# Patient Record
Sex: FEMALE | Race: WHITE | NOT HISPANIC OR LATINO | Employment: UNEMPLOYED | URBAN - METROPOLITAN AREA
[De-identification: names, ages, dates, MRNs, and addresses within clinical notes are randomized per-mention and may not be internally consistent; named-entity substitution may affect disease eponyms.]

---

## 2017-01-17 RX ORDER — LOSARTAN POTASSIUM AND HYDROCHLOROTHIAZIDE 12.5; 5 MG/1; MG/1
1 TABLET ORAL DAILY
Qty: 90 TABLET | Refills: 0 | Status: SHIPPED | OUTPATIENT
Start: 2017-01-17 | End: 2017-05-24 | Stop reason: SDUPTHER

## 2017-01-17 RX ORDER — MONTELUKAST SODIUM 10 MG/1
10 TABLET ORAL DAILY
Qty: 90 TABLET | Refills: 0 | Status: SHIPPED | OUTPATIENT
Start: 2017-01-17 | End: 2017-02-02 | Stop reason: SDUPTHER

## 2017-01-26 DIAGNOSIS — E78.5 HYPERLIPIDEMIA, UNSPECIFIED HYPERLIPIDEMIA TYPE: Primary | ICD-10-CM

## 2017-01-26 DIAGNOSIS — I10 HYPERTENSION, ESSENTIAL, BENIGN: ICD-10-CM

## 2017-01-26 LAB
ALBUMIN SERPL-MCNC: 4.7 G/DL (ref 3.5–5.2)
ALBUMIN/GLOB SERPL: 2.1 G/DL
ALP SERPL-CCNC: 60 U/L (ref 40–129)
ALT SERPL-CCNC: 17 U/L (ref 5–33)
AST SERPL-CCNC: 17 U/L (ref 5–32)
BASOPHILS # BLD AUTO: 0.03 10*3/MM3 (ref 0–0.2)
BASOPHILS NFR BLD AUTO: 0.8 % (ref 0–2)
BILIRUB SERPL-MCNC: 0.2 MG/DL (ref 0.2–1.2)
BUN SERPL-MCNC: 18 MG/DL (ref 6–20)
BUN/CREAT SERPL: 33.3 (ref 7–25)
CALCIUM SERPL-MCNC: 9.4 MG/DL (ref 8.6–10.5)
CHLORIDE SERPL-SCNC: 101 MMOL/L (ref 98–107)
CHOLEST SERPL-MCNC: 246 MG/DL (ref 0–200)
CO2 SERPL-SCNC: 26.6 MMOL/L (ref 22–29)
CREAT SERPL-MCNC: 0.54 MG/DL (ref 0.57–1)
EOSINOPHIL # BLD AUTO: 0.09 10*3/MM3 (ref 0.1–0.3)
EOSINOPHIL NFR BLD AUTO: 2.3 % (ref 0–4)
ERYTHROCYTE [DISTWIDTH] IN BLOOD BY AUTOMATED COUNT: 11.9 % (ref 11.5–14.5)
GLOBULIN SER CALC-MCNC: 2.2 GM/DL
GLUCOSE SERPL-MCNC: 87 MG/DL (ref 65–99)
HCT VFR BLD AUTO: 40.7 % (ref 37–47)
HDLC SERPL-MCNC: 78 MG/DL (ref 40–60)
HGB BLD-MCNC: 13.7 G/DL (ref 12–16)
IMM GRANULOCYTES # BLD: 0.01 10*3/MM3 (ref 0–0.03)
IMM GRANULOCYTES NFR BLD: 0.3 % (ref 0–0.5)
LDLC SERPL CALC-MCNC: 135 MG/DL (ref 0–100)
LYMPHOCYTES # BLD AUTO: 1.49 10*3/MM3 (ref 0.6–4.8)
LYMPHOCYTES NFR BLD AUTO: 38.5 % (ref 20–45)
MCH RBC QN AUTO: 33.6 PG (ref 27–31)
MCHC RBC AUTO-ENTMCNC: 33.7 G/DL (ref 31–37)
MCV RBC AUTO: 99.8 FL (ref 81–99)
MONOCYTES # BLD AUTO: 0.37 10*3/MM3 (ref 0–1)
MONOCYTES NFR BLD AUTO: 9.6 % (ref 3–8)
NEUTROPHILS # BLD AUTO: 1.88 10*3/MM3 (ref 1.5–8.3)
NEUTROPHILS NFR BLD AUTO: 48.5 % (ref 45–70)
NRBC BLD AUTO-RTO: 0 /100 WBC (ref 0–0)
PLATELET # BLD AUTO: 242 10*3/MM3 (ref 140–500)
POTASSIUM SERPL-SCNC: 4 MMOL/L (ref 3.5–5.2)
PROT SERPL-MCNC: 6.9 G/DL (ref 6–8.5)
RBC # BLD AUTO: 4.08 10*6/MM3 (ref 4.2–5.4)
SODIUM SERPL-SCNC: 142 MMOL/L (ref 136–145)
TRIGL SERPL-MCNC: 166 MG/DL (ref 0–150)
TSH SERPL DL<=0.005 MIU/L-ACNC: 1.93 MIU/ML (ref 0.27–4.2)
VLDLC SERPL CALC-MCNC: 33.2 MG/DL (ref 7–27)
WBC # BLD AUTO: 3.87 10*3/MM3 (ref 4.8–10.8)

## 2017-02-02 ENCOUNTER — OFFICE VISIT (OUTPATIENT)
Dept: FAMILY MEDICINE CLINIC | Facility: CLINIC | Age: 47
End: 2017-02-02

## 2017-02-02 VITALS
HEIGHT: 60 IN | RESPIRATION RATE: 16 BRPM | WEIGHT: 116 LBS | BODY MASS INDEX: 22.78 KG/M2 | OXYGEN SATURATION: 99 % | DIASTOLIC BLOOD PRESSURE: 70 MMHG | TEMPERATURE: 98.2 F | SYSTOLIC BLOOD PRESSURE: 130 MMHG | HEART RATE: 98 BPM

## 2017-02-02 DIAGNOSIS — B00.1 RECURRENT HERPES LABIALIS: ICD-10-CM

## 2017-02-02 DIAGNOSIS — I10 ESSENTIAL HYPERTENSION: Primary | ICD-10-CM

## 2017-02-02 DIAGNOSIS — E78.2 MIXED HYPERLIPIDEMIA: ICD-10-CM

## 2017-02-02 DIAGNOSIS — D17.22 LIPOMA OF LEFT UPPER EXTREMITY: ICD-10-CM

## 2017-02-02 PROCEDURE — 99214 OFFICE O/P EST MOD 30 MIN: CPT | Performed by: FAMILY MEDICINE

## 2017-02-02 RX ORDER — VALACYCLOVIR HYDROCHLORIDE 1 G/1
TABLET, FILM COATED ORAL
Qty: 16 TABLET | Refills: 2 | Status: SHIPPED | OUTPATIENT
Start: 2017-02-02 | End: 2017-02-03 | Stop reason: SDUPTHER

## 2017-02-02 RX ORDER — MONTELUKAST SODIUM 10 MG/1
10 TABLET ORAL DAILY
Qty: 90 TABLET | Refills: 3 | Status: SHIPPED | OUTPATIENT
Start: 2017-02-02 | End: 2017-05-24 | Stop reason: SDUPTHER

## 2017-02-02 RX ORDER — FLUCONAZOLE 150 MG/1
TABLET ORAL
Qty: 2 TABLET | Refills: 0 | Status: SHIPPED | OUTPATIENT
Start: 2017-02-02 | End: 2017-02-03 | Stop reason: SDUPTHER

## 2017-02-02 NOTE — PROGRESS NOTES
Subjective   Cindy Salas is a 46 y.o. female.   She  is here today to f/u on   Chief Complaint   Patient presents with   • Hypertension   • Hyperlipidemia   • Fever Blister   • Post Nasal Drip   .     History of Present Illness   Mrs. Salas Is here to follow up on hypertension, hyperlipidemia ,And Sinus drainage. Continues to have frequent herpes labalis. Has been using over-the-counter Lip ointment with no Resolution. Has used Valtrex in the past with Good Results.Has been watching her diet closely In efforts to improve her cholesterol. Blood pressure is well-controlled. Feels chronic post nasal drip And the fullness especially of right ear.  Sub Q soft tissue formation in left anti cubital fossa.  Gets tender at times.  The following portions of the patient's history were reviewed and updated as appropriate: current medications, past family history, past medical history, past social history, past surgical history and problem list.    Review of Systems   HENT: Positive for congestion.    All other systems reviewed and are negative.      Objective    Vitals:    02/02/17 1430   BP: 130/70   Pulse: 98   Resp: 16   Temp: 98.2 °F (36.8 °C)   SpO2: 99%     BP Readings from Last 3 Encounters:   02/02/17 130/70   05/03/16 132/82   10/01/15 140/76       Physical Exam   Constitutional: She is oriented to person, place, and time. She appears well-developed and well-nourished.   HENT:   Head: Normocephalic and atraumatic.   Mouth/Throat: Oropharynx is clear and moist.   R TM clear without fluid   upper lip with 1.5 cm darian ulcerating nodule with crust at vermilion border. Tender.  Pale swollen turbinates.   Eyes: EOM are normal. Pupils are equal, round, and reactive to light.   Neck: Normal range of motion. Neck supple. No thyromegaly present.   Cardiovascular: Normal rate, regular rhythm and normal heart sounds.    Pulmonary/Chest: Effort normal and breath sounds normal.   Musculoskeletal: Normal range of motion. She  exhibits no edema.   Lymphadenopathy:     She has no cervical adenopathy.   Neurological: She is alert and oriented to person, place, and time. No cranial nerve deficit.   Skin: Skin is warm and dry.   Spongy soft tissue mass just proximal to R anti cubital fossa.  3x5 cm   Psychiatric: She has a normal mood and affect. Her behavior is normal. Judgment and thought content normal.   Nursing note and vitals reviewed.      Invalid input(s): 2W    Assessment/Plan   Cindy was seen today for hypertension, hyperlipidemia, fever blister and post nasal drip.    Diagnoses and all orders for this visit:    Essential hypertension    Mixed hyperlipidemia    Lipoma of left upper extremity    Recurrent herpes labialis    Other orders  -     montelukast (SINGULAIR) 10 MG tablet; Take 1 tablet by mouth Daily.  -     valACYclovir (VALTREX) 1000 MG tablet; 2 by mouth and repeat 1 time in 12 hours  -     fluconazole (DIFLUCAN) 150 MG tablet; 1 tab po and Repeat in 3 days        Patient Instructions   Start nonsedating antihistamine.          Orders Only on 01/26/2017   Component Date Value Ref Range Status   • WBC 01/26/2017 3.87* 4.80 - 10.80 10*3/mm3 Final   • RBC 01/26/2017 4.08* 4.20 - 5.40 10*6/mm3 Final   • Hemoglobin 01/26/2017 13.7  12.0 - 16.0 g/dL Final   • Hematocrit 01/26/2017 40.7  37.0 - 47.0 % Final   • MCV 01/26/2017 99.8* 81.0 - 99.0 fL Final   • MCH 01/26/2017 33.6* 27.0 - 31.0 pg Final   • MCHC 01/26/2017 33.7  31.0 - 37.0 g/dL Final   • RDW 01/26/2017 11.9  11.5 - 14.5 % Final   • Platelets 01/26/2017 242  140 - 500 10*3/mm3 Final   • Neutrophil Rel % 01/26/2017 48.5  45.0 - 70.0 % Final   • Lymphocyte Rel % 01/26/2017 38.5  20.0 - 45.0 % Final   • Monocyte Rel % 01/26/2017 9.6* 3.0 - 8.0 % Final   • Eosinophil Rel % 01/26/2017 2.3  0.0 - 4.0 % Final   • Basophil Rel % 01/26/2017 0.8  0.0 - 2.0 % Final   • Neutrophils Absolute 01/26/2017 1.88  1.50 - 8.30 10*3/mm3 Final   • Lymphocytes Absolute 01/26/2017 1.49  0.60  - 4.80 10*3/mm3 Final   • Monocytes Absolute 01/26/2017 0.37  0.00 - 1.00 10*3/mm3 Final   • Eosinophils Absolute 01/26/2017 0.09* 0.10 - 0.30 10*3/mm3 Final   • Basophils Absolute 01/26/2017 0.03  0.00 - 0.20 10*3/mm3 Final   • Immature Granulocyte Rel % 01/26/2017 0.3  0.0 - 0.5 % Final   • Immature Grans Absolute 01/26/2017 0.01  0.00 - 0.03 10*3/mm3 Final   • nRBC 01/26/2017 0.0  0.0 - 0.0 /100 WBC Final   • Glucose 01/26/2017 87  65 - 99 mg/dL Final   • BUN 01/26/2017 18  6 - 20 mg/dL Final   • Creatinine 01/26/2017 0.54* 0.57 - 1.00 mg/dL Final   • eGFR Non African Am 01/26/2017 120  >60 mL/min/1.73 Final   • eGFR African Am 01/26/2017 146  >60 mL/min/1.73 Final   • BUN/Creatinine Ratio 01/26/2017 33.3* 7.0 - 25.0 Final   • Sodium 01/26/2017 142  136 - 145 mmol/L Final   • Potassium 01/26/2017 4.0  3.5 - 5.2 mmol/L Final   • Chloride 01/26/2017 101  98 - 107 mmol/L Final   • Total CO2 01/26/2017 26.6  22.0 - 29.0 mmol/L Final   • Calcium 01/26/2017 9.4  8.6 - 10.5 mg/dL Final   • Total Protein 01/26/2017 6.9  6.0 - 8.5 g/dL Final   • Albumin 01/26/2017 4.70  3.50 - 5.20 g/dL Final   • Globulin 01/26/2017 2.2  gm/dL Final   • A/G Ratio 01/26/2017 2.1  g/dL Final   • Total Bilirubin 01/26/2017 0.2  0.2 - 1.2 mg/dL Final   • Alkaline Phosphatase 01/26/2017 60  40 - 129 U/L Final   • AST (SGOT) 01/26/2017 17  5 - 32 U/L Final   • ALT (SGPT) 01/26/2017 17  5 - 33 U/L Final   • Total Cholesterol 01/26/2017 246* 0 - 200 mg/dL Final   • Triglycerides 01/26/2017 166* 0 - 150 mg/dL Final   • HDL Cholesterol 01/26/2017 78* 40 - 60 mg/dL Final   • VLDL Cholesterol 01/26/2017 33.2* 7 - 27 mg/dL Final   • LDL Cholesterol  01/26/2017 135* 0 - 100 mg/dL Final   • TSH 01/26/2017 1.930  0.270 - 4.200 mIU/mL Final

## 2017-02-03 RX ORDER — VALACYCLOVIR HYDROCHLORIDE 1 G/1
TABLET, FILM COATED ORAL
Qty: 16 TABLET | Refills: 2 | Status: SHIPPED | OUTPATIENT
Start: 2017-02-03

## 2017-02-03 RX ORDER — FLUCONAZOLE 150 MG/1
TABLET ORAL
Qty: 2 TABLET | Refills: 0 | Status: SHIPPED | OUTPATIENT
Start: 2017-02-03 | End: 2017-07-07 | Stop reason: SDUPTHER

## 2017-05-16 RX ORDER — LOSARTAN POTASSIUM AND HYDROCHLOROTHIAZIDE 12.5; 5 MG/1; MG/1
TABLET ORAL
Qty: 90 TABLET | Refills: 2 | Status: SHIPPED | OUTPATIENT
Start: 2017-05-16 | End: 2017-05-24 | Stop reason: SDUPTHER

## 2017-05-24 RX ORDER — LOSARTAN POTASSIUM AND HYDROCHLOROTHIAZIDE 12.5; 5 MG/1; MG/1
1 TABLET ORAL DAILY
Qty: 90 TABLET | Refills: 1 | Status: SHIPPED | OUTPATIENT
Start: 2017-05-24 | End: 2017-06-29 | Stop reason: SDUPTHER

## 2017-05-24 RX ORDER — LOSARTAN POTASSIUM AND HYDROCHLOROTHIAZIDE 12.5; 5 MG/1; MG/1
1 TABLET ORAL DAILY
Qty: 90 TABLET | Refills: 1 | Status: SHIPPED | OUTPATIENT
Start: 2017-05-24 | End: 2017-09-05

## 2017-05-24 RX ORDER — MONTELUKAST SODIUM 10 MG/1
10 TABLET ORAL DAILY
Qty: 90 TABLET | Refills: 1 | Status: SHIPPED | OUTPATIENT
Start: 2017-05-24

## 2017-06-29 ENCOUNTER — OFFICE VISIT (OUTPATIENT)
Dept: FAMILY MEDICINE CLINIC | Facility: CLINIC | Age: 47
End: 2017-06-29

## 2017-06-29 VITALS
RESPIRATION RATE: 16 BRPM | BODY MASS INDEX: 22.19 KG/M2 | SYSTOLIC BLOOD PRESSURE: 140 MMHG | WEIGHT: 113 LBS | TEMPERATURE: 98 F | DIASTOLIC BLOOD PRESSURE: 70 MMHG | HEART RATE: 102 BPM | HEIGHT: 60 IN | OXYGEN SATURATION: 99 %

## 2017-06-29 DIAGNOSIS — K11.8 SALIVARY DUCT OBSTRUCTION: Primary | ICD-10-CM

## 2017-06-29 DIAGNOSIS — K11.20 SIALADENITIS: ICD-10-CM

## 2017-06-29 DIAGNOSIS — R10.13 DYSPEPSIA: ICD-10-CM

## 2017-06-29 DIAGNOSIS — M76.60 ACHILLES TENDON PAIN: ICD-10-CM

## 2017-06-29 DIAGNOSIS — R22.0 LEFT FACIAL SWELLING: ICD-10-CM

## 2017-06-29 PROCEDURE — 99214 OFFICE O/P EST MOD 30 MIN: CPT | Performed by: FAMILY MEDICINE

## 2017-06-29 NOTE — PROGRESS NOTES
Subjective   Cidny Salas is a 47 y.o. female.   She  is here today to f/u on   Chief Complaint   Patient presents with   • Sinusitis   • Pain     upper abdomen   .     History of Present Illness   Dyspepsia off/on for yr.  All of the discomfort seems to be midepigastric.  There is no lower abdominal bloating or cramping.  Worse last couple weeks.She decreased her fiber and has had less bloating and discomfort in the last 3 days.  She is used vinegar and water and aloe vera juice which does help most times.  Complains of swelling of the left side of her face around her eye and her maxilla.  Very dry mouth.  Sore throat as though with chronic drainage.  Left ear full and with pressure.  The following portions of the patient's history were reviewed and updated as appropriate: current medications, past family history, past medical history, past social history, past surgical history and problem list.    Review of Systems   HENT: Positive for ear pain, sinus pressure and sore throat.    Gastrointestinal: Positive for abdominal pain.   All other systems reviewed and are negative.      Objective    Vitals:    06/29/17 1100   BP: 140/70   Pulse: 102   Resp: 16   Temp: 98 °F (36.7 °C)   SpO2: 99%   Body mass index is 22.07 kg/(m^2).    BP Readings from Last 3 Encounters:   06/29/17 140/70   02/02/17 130/70   05/03/16 132/82       Physical Exam   Constitutional: She is oriented to person, place, and time. She appears well-developed and well-nourished.   HENT:   Head: Normocephalic and atraumatic.   Right Ear: External ear normal.   Left Ear: External ear normal.   Left buccal mucosa with erythematous clogged opening of the salivary duct.  Fullness within the salivary gland and tenderness when palpated.  Left maxillary area and periorbital mildly swollen with no erythema.   Eyes: EOM are normal. Pupils are equal, round, and reactive to light.   Neck: Normal range of motion. Neck supple. No thyromegaly present.    Cardiovascular: Normal rate, regular rhythm and normal heart sounds.    Pulmonary/Chest: Effort normal and breath sounds normal.   Abdominal: Soft. She exhibits no distension and no mass. There is tenderness. There is no rebound and no guarding.   Mild midepigastric tenderness no mass, no guarding or rebound.  Lower abdominal exam is normal.   Musculoskeletal: Normal range of motion. She exhibits no edema.   Lymphadenopathy:     She has no cervical adenopathy.   Neurological: She is alert and oriented to person, place, and time. No cranial nerve deficit.   Skin: Skin is warm and dry.   Psychiatric: She has a normal mood and affect. Her behavior is normal. Thought content normal.   Nursing note and vitals reviewed.      Invalid input(s): 2W    Assessment/Plan   Cindy was seen today for sinusitis and pain.    Diagnoses and all orders for this visit:    Salivary duct obstruction    Left facial swelling    Dyspepsia    Sialadenitis        Patient Instructions   pepcid AC  Recommend she use the sour wrist lemon drops, Sudafed and ibuprofen for normal milligrams twice a day as well as massage to the left salivary area.  We'll give her written prescription for Augmentin 875 one by mouth twice a day for 7 days  to be used if this is not improved in the next 4-5 days.

## 2017-06-29 NOTE — PATIENT INSTRUCTIONS
pepcid AC  Recommend she use the sour wrist lemon drops, Sudafed and ibuprofen for normal milligrams twice a day as well as massage to the left salivary area.  We'll give her written prescription for Augmentin 875 one by mouth twice a day for 7 days  to be used if this is not improved in the next 4-5 days.

## 2017-07-05 ENCOUNTER — TELEPHONE (OUTPATIENT)
Dept: FAMILY MEDICINE CLINIC | Facility: CLINIC | Age: 47
End: 2017-07-05

## 2017-07-05 RX ORDER — CLINDAMYCIN HYDROCHLORIDE 300 MG/1
300 CAPSULE ORAL 3 TIMES DAILY
Qty: 21 CAPSULE | Refills: 0 | Status: SHIPPED | OUTPATIENT
Start: 2017-07-05 | End: 2017-09-05

## 2017-07-05 NOTE — TELEPHONE ENCOUNTER
notify patient that I have sent a prescription for clindamycin to pharmacy.  Please stop Augmentin.  She may take over-the-counter Benadryl for her itchy rash.  I have listed Augmentin as an allergy in her chart

## 2017-07-07 ENCOUNTER — TELEPHONE (OUTPATIENT)
Dept: FAMILY MEDICINE CLINIC | Facility: CLINIC | Age: 47
End: 2017-07-07

## 2017-07-07 RX ORDER — FLUCONAZOLE 150 MG/1
TABLET ORAL
Qty: 2 TABLET | Refills: 0 | Status: SHIPPED | OUTPATIENT
Start: 2017-07-07 | End: 2017-09-05

## 2017-07-07 NOTE — TELEPHONE ENCOUNTER
Patient says she has a vaginal yeast infection from taking antibiotics.  She is requesting Rx Diflucan.

## 2017-07-13 ENCOUNTER — TELEPHONE (OUTPATIENT)
Dept: FAMILY MEDICINE CLINIC | Facility: CLINIC | Age: 47
End: 2017-07-13

## 2017-07-13 RX ORDER — DOXYCYCLINE HYCLATE 100 MG/1
100 TABLET, DELAYED RELEASE ORAL 2 TIMES DAILY
Qty: 20 TABLET | Refills: 0 | Status: SHIPPED | OUTPATIENT
Start: 2017-07-13 | End: 2017-09-05

## 2017-07-13 NOTE — TELEPHONE ENCOUNTER
Pt left msg requesting another round of abx, she went to the dentist and they told her that it seemed like her problems were more sinus related. She thinks another round of abx will finish clearing everything up. Please advise

## 2017-07-17 ENCOUNTER — TELEPHONE (OUTPATIENT)
Dept: FAMILY MEDICINE CLINIC | Facility: CLINIC | Age: 47
End: 2017-07-17

## 2017-07-17 RX ORDER — METHYLPREDNISOLONE 4 MG/1
TABLET ORAL
Qty: 21 TABLET | Refills: 0 | Status: SHIPPED | OUTPATIENT
Start: 2017-07-17 | End: 2017-09-05

## 2017-07-17 NOTE — TELEPHONE ENCOUNTER
Pt has been on two rounds of abx  And still has a lot of facial swelling and ear pressure/ pain. Thought maybe a steroid pack would help? Please advsie

## 2017-09-05 ENCOUNTER — OFFICE VISIT (OUTPATIENT)
Dept: FAMILY MEDICINE CLINIC | Facility: CLINIC | Age: 47
End: 2017-09-05

## 2017-09-05 VITALS
SYSTOLIC BLOOD PRESSURE: 166 MMHG | HEART RATE: 103 BPM | RESPIRATION RATE: 16 BRPM | DIASTOLIC BLOOD PRESSURE: 90 MMHG | TEMPERATURE: 98.4 F | WEIGHT: 115 LBS | OXYGEN SATURATION: 99 % | HEIGHT: 60 IN | BODY MASS INDEX: 22.58 KG/M2

## 2017-09-05 DIAGNOSIS — I10 ESSENTIAL HYPERTENSION: Primary | ICD-10-CM

## 2017-09-05 PROCEDURE — 99213 OFFICE O/P EST LOW 20 MIN: CPT | Performed by: FAMILY MEDICINE

## 2017-09-05 RX ORDER — CARVEDILOL 12.5 MG/1
12.5 TABLET ORAL 2 TIMES DAILY WITH MEALS
Qty: 30 TABLET | Refills: 11 | Status: SHIPPED | OUTPATIENT
Start: 2017-09-05 | End: 2017-10-24 | Stop reason: ALTCHOICE

## 2017-09-05 NOTE — PATIENT INSTRUCTIONS
She will do blood pressure log home and bring those in in 6 weeks time for my review.  She does not require an office visit unless they are unstable or she does not tolerate Coreg.

## 2017-09-05 NOTE — PROGRESS NOTES
Subjective   Cindy Salas is a 47 y.o. female.   She  is here today to f/u on   Chief Complaint   Patient presents with   • Hypertension   .     History of Present Illness   Ms. Salas comes in today to discuss her blood pressure medicine.  She has been having swelling of her lower face nasal alar and periorally.  Flushing of her upper chest and shoulders.  Sensitivity of her upper chest and upper extremities to light touch.  General malaise as well.  This been going on approximately 2 months.  She noted these as side effects of losartan and stopped her medication.  Immediately began feeling better and swelling started to subside.  Still has some around her mouth.  Denies shortness of breath or nausea.  The following portions of the patient's history were reviewed and updated as appropriate: current medications, past family history, past medical history, past social history, past surgical history and problem list.    Review of Systems   Constitutional: Negative.    HENT: Negative.    Eyes: Negative.    Respiratory: Negative.    Cardiovascular: Negative.    Gastrointestinal: Negative.    Endocrine: Negative.    Genitourinary: Negative.    Musculoskeletal: Negative.    Skin: Negative.         Skin sensitivity on chest and arms, facial swelling   Allergic/Immunologic: Negative.    Neurological: Negative.    Hematological: Negative.    Psychiatric/Behavioral: Negative.    All other systems reviewed and are negative.      Objective    Vitals:    09/05/17 1130   BP: 166/90   Pulse: 103   Resp: 16   Temp: 98.4 °F (36.9 °C)   SpO2: 99%     Allergies   Allergen Reactions   • Losartan Potassium-Hctz Swelling   • Morphine And Related Itching   • Augmentin [Amoxicillin-Pot Clavulanate] Rash     Allergies   Allergen Reactions   • Losartan Potassium-Hctz Swelling   • Morphine And Related Itching   • Augmentin [Amoxicillin-Pot Clavulanate] Rash       BP Readings from Last 3 Encounters:   09/05/17 166/90   06/29/17 140/70    02/02/17 130/70       Physical Exam   Constitutional: She is oriented to person, place, and time. She appears well-developed and well-nourished.   Cardiovascular: Normal rate, regular rhythm and normal heart sounds.    Pulmonary/Chest: Effort normal and breath sounds normal.   Neurological: She is alert and oriented to person, place, and time.   Skin: Skin is warm and dry. No erythema.   Mild swelling adjacent to nasal ala and malar area   Psychiatric: She has a normal mood and affect. Her behavior is normal. Judgment and thought content normal.   Nursing note and vitals reviewed.      Invalid input(s): 2W    Assessment/Plan   Cindy was seen today for hypertension.    Diagnoses and all orders for this visit:    Essential hypertension    Other orders  -     carvedilol (COREG) 12.5 MG tablet; Take 1 tablet by mouth 2 (Two) Times a Day With Meals.        Patient Instructions   She will do blood pressure log home and bring those in in 6 weeks time for my review.  She does not require an office visit unless they are unstable or she does not tolerate Coreg.

## 2017-09-21 ENCOUNTER — TRANSCRIBE ORDERS (OUTPATIENT)
Dept: PHYSICAL THERAPY | Facility: CLINIC | Age: 47
End: 2017-09-21

## 2017-09-21 DIAGNOSIS — M79.671 RIGHT FOOT PAIN: Primary | ICD-10-CM

## 2017-09-25 ENCOUNTER — TELEPHONE (OUTPATIENT)
Dept: FAMILY MEDICINE CLINIC | Facility: CLINIC | Age: 47
End: 2017-09-25

## 2017-09-25 NOTE — TELEPHONE ENCOUNTER
Pt requesting a water pill.  She said you changed her BP medication and her feet and hands are swollen.

## 2017-09-26 RX ORDER — HYDROCHLOROTHIAZIDE 12.5 MG/1
12.5 TABLET ORAL DAILY
Qty: 30 TABLET | Refills: 5 | Status: SHIPPED | OUTPATIENT
Start: 2017-09-26 | End: 2017-10-24 | Stop reason: ALTCHOICE

## 2017-09-26 NOTE — TELEPHONE ENCOUNTER
I have sent a water pill to the pharmacy.  It is the same water pill that was contained in her previous blood pressure medication.  If she gets facial swelling/flushing again she should stop it and let me know immediately.

## 2017-09-27 ENCOUNTER — TREATMENT (OUTPATIENT)
Dept: PHYSICAL THERAPY | Facility: CLINIC | Age: 47
End: 2017-09-27

## 2017-09-27 ENCOUNTER — TELEPHONE (OUTPATIENT)
Dept: FAMILY MEDICINE CLINIC | Facility: CLINIC | Age: 47
End: 2017-09-27

## 2017-09-27 DIAGNOSIS — T79.A22D: Primary | ICD-10-CM

## 2017-09-27 PROCEDURE — 97161 PT EVAL LOW COMPLEX 20 MIN: CPT | Performed by: PHYSICAL THERAPIST

## 2017-09-27 PROCEDURE — 97110 THERAPEUTIC EXERCISES: CPT | Performed by: PHYSICAL THERAPIST

## 2017-09-27 PROCEDURE — 97140 MANUAL THERAPY 1/> REGIONS: CPT | Performed by: PHYSICAL THERAPIST

## 2017-09-27 NOTE — PROGRESS NOTES
Physical Therapy Initial Evaluation and Plan of Care        Patient: Cindy Salas   : 1970  Diagnosis/ICD-10 Code:  Posterior tibial compartment syndrome, left, subsequent encounter [T79.A22D]  Referring practitioner: Jairo Clarke MD  Date of Initial Visit: 2017  Today's Date: 2017  Patient seen for 1 sessions               Subjective Evaluation    History of Present Illness  Date of onset: 2016  Mechanism of injury: Initial injury 20 years ago gymnastics - progressive pain off an on ever since - with increased frequency and intensity over the past year - feel fullness in posterior distal calf with increased activity     Quality of life: excellent    Pain  Current pain rating: 3  At best pain ratin  At worst pain rating: 3  Location: distal left achilles tendon   Quality: cramping (pressure, fullness)  Relieving factors: ice and rest  Aggravating factors: ambulation, stairs and repetitive movement  Progression: worsening    Social Support  Lives in: multiple-level home    Diagnostic Tests  X-ray: normal    Treatments  Previous treatment: medication (advil)  Current treatment: physical therapy  Patient Goals  Patient goals for therapy: decreased edema, decreased pain, increased motion, increased strength, independence with ADLs/IADLs and return to sport/leisure activities             Objective     Neurological Testing   Sensation     Ankle/Foot   Left Ankle/Foot   Diminished: light touch    Comments   Left light touch: decreased posterior calf.     Active Range of Motion   Left Ankle/Foot   Dorsiflexion (ke): 15 degrees   Plantar flexion: 65 degrees   Inversion: 45 degrees   Eversion: 30 degrees     Strength/Myotome Testing     Left Ankle/Foot   Dorsiflexion: 5  Plantar flexion: 5  Inversion: 5  Eversion: 5    Tests   Left Ankle/Foot   Negative for anterior drawer, calcaneal squeeze, interdigital neuroma, metatarsal squeeze, syndesmosis squeeze, valgus tilt and varus tilt.           Assessment & Plan     Assessment  Impairments: abnormal gait, abnormal muscle tone, abnormal or restricted ROM, activity intolerance, impaired balance, impaired physical strength and pain with function  Assessment details: 47 y.o female presents with 1) tender left medial and lateral gastroc 2) c/o fullness and discomfort progresses with activity 3) pain with prolonged standing/ walking 4) 3-4 consistent level of discomfort  Prognosis: good  Functional Limitations: stooping  Goals  Plan Goals: SHORT TERM GOALS:     4 weeks  1. Pt independent with HEP   2. Pt to demonstrate ability to ambulate in the clinic without antalgic gait   3. Pt to demonstrate ability to perform single leg heel raise on the left without increase in pain      LONG TERM GOALS:   8 weeks  1. Pt to demonstrate ability to perform full functional squat with good form and no increase in pain in the left foot/ankle  2. Pt to demonstrate ability to ambulate on TM for 10 minutes with normal gait pattern without increase in pain  3. Pt to demonstrate ability to perform SLS on the left lower extremity for 30 seconds without LOB or increased pain     Plan  Therapy options: will be seen for skilled physical therapy services  Planned modality interventions: cryotherapy, electrical stimulation/Russian stimulation, iontophoresis, TENS, thermotherapy (hydrocollator packs) and ultrasound  Other planned modality interventions: dry needling  Planned therapy interventions: ADL retraining, balance/weight-bearing training, flexibility, functional ROM exercises, gait training, home exercise program, manual therapy, soft tissue mobilization, strengthening, stretching and therapeutic activities  Frequency: 2x week  Duration in weeks: 8  Treatment plan discussed with: patient        Manual Therapy:    8    mins  10253;  Therapeutic Exercise:    15     mins  17255;     Neuromuscular Kolby:        mins  41986;    Therapeutic Activity:          mins  25572;     Gait  Training:           mins  02311;     Ultrasound:     8     mins  43856;    Electrical Stimulation:         mins  23171 ( );  Dry Needling          mins self-pay    Timed Treatment:   31   mins   Total Treatment:     59   mins    PT SIGNATURE: Yamilet Vallecillo, PT   DATE TREATMENT INITIATED: 9/27/2017    Initial Certification  Certification Period: 12/26/2017  I certify that the therapy services are furnished while this patient is under my care.  The services outlined above are required by this patient, and will be reviewed every 90 days.     PHYSICIAN: Jairo Clarke MD      DATE:     Please sign and return via fax to [phone].. Thank you, [facility].

## 2017-10-03 ENCOUNTER — TREATMENT (OUTPATIENT)
Dept: PHYSICAL THERAPY | Facility: CLINIC | Age: 47
End: 2017-10-03

## 2017-10-03 DIAGNOSIS — T79.A22D: Primary | ICD-10-CM

## 2017-10-03 PROCEDURE — 97110 THERAPEUTIC EXERCISES: CPT | Performed by: PHYSICAL THERAPIST

## 2017-10-03 PROCEDURE — 97140 MANUAL THERAPY 1/> REGIONS: CPT | Performed by: PHYSICAL THERAPIST

## 2017-10-03 NOTE — PROGRESS NOTES
Physical Therapy Daily Progress Note        Visit # : 2  Cindy Salas reports: I have been feeling better since last visit - think I would like to try the Dry Needling on Thursday     Subjective     Objective     Palpation     Right   Hypertonic in the anterior tibialis and peroneus.      See Exercise, Manual, and Modality Logs for complete treatment.       Assessment & Plan     Assessment  Assessment details: Presents with improved muscle tone left medial shin. Several small palpable knots still present along medial ridge of anterior shin.  Overall improved subjective reports and less reports of cramping. Tolerated all ROM/ Strength progressions well today. Cont PT 2x per week for mobility and strength- next visit attempt Dry Needling, rocker board, and/or Baps if tolerated.         Progress strengthening /stabilization /functional activity           Manual Therapy:   8     mins  23329;  Therapeutic Exercise:    19     mins  85576;     Neuromuscular Kolby:        mins  63122;    Therapeutic Activity:          mins  74985;     Gait Training:           mins  21204;     Ultrasound:     8     mins  15958;    Electrical Stimulation:         mins  01654 ( );  Dry Needling          mins self-pay    Timed Treatment:   35   mins   Total Treatment:     45   mins    Yamilet Vallecillo PT  Physical Therapist  [place] License # [id number]

## 2017-10-05 ENCOUNTER — TREATMENT (OUTPATIENT)
Dept: PHYSICAL THERAPY | Facility: CLINIC | Age: 47
End: 2017-10-05

## 2017-10-05 DIAGNOSIS — T79.A22D: Primary | ICD-10-CM

## 2017-10-05 PROCEDURE — DRYNDL PR CUSTOM DRY NEEDLING SELF PAY: Performed by: PHYSICAL THERAPIST

## 2017-10-05 NOTE — PROGRESS NOTES
Physical Therapy Daily Progress Note        Visit # : 3  Cindy Salas reports: I think I would like to try the Dry Needling today - I am still a little sore on the inside of my shin     Subjective     Objective     Palpation     Right   Hypertonic in the anterior tibialis, medial gastrocnemius, peroneus and posterior tibialis.      See Exercise, Manual, and Modality Logs for complete treatment.       Assessment & Plan     Assessment  Assessment details: Informed and signed consent for Dry Needling obtained. Improved subjective reports and muscle tone after Dry Needling attempts today. Assess next visit and return to gentle there ex as tolerated. Cont PT 1-2x per week for strength, stabilization, and functional progressions.         Progress strengthening /stabilization /functional activity           Manual Therapy:         mins  76120;  Therapeutic Exercise:         mins  52441;     Neuromuscular Kolby:        mins  12167;    Therapeutic Activity:         mins  92970;     Gait Training:           mins  02109;     Ultrasound:          mins  80533;    Electrical Stimulation:         mins  15431 ( );  Dry Needling     15     mins self-pay      Total Treatment:     25   mins    Yamilet Vallecillo, PT  Physical Therapist  [place] License # [id number]

## 2017-10-10 ENCOUNTER — TREATMENT (OUTPATIENT)
Dept: PHYSICAL THERAPY | Facility: CLINIC | Age: 47
End: 2017-10-10

## 2017-10-10 DIAGNOSIS — T79.A22D: Primary | ICD-10-CM

## 2017-10-10 PROCEDURE — 97530 THERAPEUTIC ACTIVITIES: CPT | Performed by: PHYSICAL THERAPIST

## 2017-10-10 PROCEDURE — 97110 THERAPEUTIC EXERCISES: CPT | Performed by: PHYSICAL THERAPIST

## 2017-10-10 NOTE — PROGRESS NOTES
Physical Therapy Daily Progress Note      Visit # : 4  Cindy Salas reports: The Dry Needling helped quite a bit last time - still feels better overall - occasionally still feel pressure     Subjective     Objective       Palpation   Left   Tenderness of the anterior tibialis, medial gastrocnemius and posterior tibialis.      See Exercise, Manual, and Modality Logs for complete treatment.       Assessment & Plan     Assessment  Assessment details: Presents with improved muscle tone left medial shin.  Overall improved subjective reports and less reports of cramping. Tolerated all ROM/ Strength progressions well today. Cont PT 2x per week for mobility and strength- next visit attempt  rocker board, and/or SLB on green disc if tolerated.         Progress strengthening /stabilization /functional activity           Manual Therapy:         mins  08179;  Therapeutic Exercise:    17     mins  63630;     Neuromuscular Kolby:        mins  47108;    Therapeutic Activity:     10     mins  80127;     Gait Training:           mins  04351;     Ultrasound:     8    mins  17096;    Electrical Stimulation:         mins  64634 ( );  Dry Needling          mins self-pay    Timed Treatment:  35   mins   Total Treatment:     45   mins    Yamilet Vallecillo, PT  Physical Therapist  [place] License # [id number]

## 2017-10-12 ENCOUNTER — TREATMENT (OUTPATIENT)
Dept: PHYSICAL THERAPY | Facility: CLINIC | Age: 47
End: 2017-10-12

## 2017-10-12 DIAGNOSIS — T79.A22D: Primary | ICD-10-CM

## 2017-10-12 PROCEDURE — 97530 THERAPEUTIC ACTIVITIES: CPT | Performed by: PHYSICAL THERAPIST

## 2017-10-12 PROCEDURE — 97140 MANUAL THERAPY 1/> REGIONS: CPT | Performed by: PHYSICAL THERAPIST

## 2017-10-12 PROCEDURE — 97110 THERAPEUTIC EXERCISES: CPT | Performed by: PHYSICAL THERAPIST

## 2017-10-16 ENCOUNTER — TELEPHONE (OUTPATIENT)
Dept: FAMILY MEDICINE CLINIC | Facility: CLINIC | Age: 47
End: 2017-10-16

## 2017-10-16 NOTE — TELEPHONE ENCOUNTER
Pt called and thinks she is having another reaction to her BP med. She said you told her to just call if she started to have symptoms. She said she feels flushed and her face is puffy.

## 2017-10-17 ENCOUNTER — TREATMENT (OUTPATIENT)
Dept: PHYSICAL THERAPY | Facility: CLINIC | Age: 47
End: 2017-10-17

## 2017-10-17 DIAGNOSIS — T79.A22D: Primary | ICD-10-CM

## 2017-10-17 PROCEDURE — 97140 MANUAL THERAPY 1/> REGIONS: CPT | Performed by: PHYSICAL THERAPIST

## 2017-10-17 PROCEDURE — 97110 THERAPEUTIC EXERCISES: CPT | Performed by: PHYSICAL THERAPIST

## 2017-10-17 PROCEDURE — 97530 THERAPEUTIC ACTIVITIES: CPT | Performed by: PHYSICAL THERAPIST

## 2017-10-17 NOTE — PROGRESS NOTES
Physical Therapy Daily Progress Note        Visit # : 6  Cindy Salas reports: I still just feel tight and swollen     Subjective     Objective       Palpation     Additional Palpation Details  No palpable increased muscle tone in left medial gastroc and anterior shin     See Exercise, Manual, and Modality Logs for complete treatment.       Assessment & Plan     Assessment  Assessment details: Presents with improved muscle tone left medial shin- subjective reports of tightness/ fullness persists. Tolerated all ROM/ Strength progressions well today. Cont PT 2x per week for mobility and strength- next visit attempt leg press if tolerated.         Progress strengthening /stabilization /functional activity           Manual Therapy:    8    mins  60758;  Therapeutic Exercise:    17     mins  11935;     Neuromuscular Kolby:        mins  12915;    Therapeutic Activity:     10     mins  93409;     Gait Training:           mins  14770;     Ultrasound:     8     mins  05386;    Electrical Stimulation:         mins  84358 ( );  Dry Needling          mins self-pay    Timed Treatment:   43   mins   Total Treatment:     53   mins    Yamilet Vallecillo, PT  Physical Therapist  [place] License # [id number]

## 2017-10-19 ENCOUNTER — TREATMENT (OUTPATIENT)
Dept: PHYSICAL THERAPY | Facility: CLINIC | Age: 47
End: 2017-10-19

## 2017-10-19 DIAGNOSIS — T79.A22D: Primary | ICD-10-CM

## 2017-10-19 PROCEDURE — 97530 THERAPEUTIC ACTIVITIES: CPT | Performed by: PHYSICAL THERAPIST

## 2017-10-19 PROCEDURE — 97140 MANUAL THERAPY 1/> REGIONS: CPT | Performed by: PHYSICAL THERAPIST

## 2017-10-19 PROCEDURE — 97110 THERAPEUTIC EXERCISES: CPT | Performed by: PHYSICAL THERAPIST

## 2017-10-19 NOTE — PROGRESS NOTES
Physical Therapy Daily Progress Note        Visit # : 7  Cindy Salas reports: I am on day 2 of decreasing my meds to hopefully decrease my swelling     Subjective     Objective       Observations   Left Ankle/Foot   Positive for edema.     Additional Observation Details  Increased distal left medial gastroc/ anterior shin palpable edema     See Exercise, Manual, and Modality Logs for complete treatment.       Assessment & Plan     Assessment  Assessment details: Presents with increased muscle tone left medial shin and palpable edema.  Tolerated all ROM/ Strength progressions well today. Cont PT 2x per week for mobility and strength- next visit attempt leg press if tolerated.         Progress strengthening /stabilization /functional activity           Manual Therapy:    8     mins  41856;  Therapeutic Exercise:    14     mins  20587;     Neuromuscular oKlby:        mins  24240;    Therapeutic Activity:     10     mins  17269;     Gait Training:           mins  22306;     Ultrasound:     8     mins  44948;    Electrical Stimulation:         mins  74167 ( );  Dry Needling          mins self-pay    Timed Treatment:   40   mins   Total Treatment:     50   mins    Yamilet Vallecillo, PT  Physical Therapist  [place] License # [id number]

## 2017-10-24 ENCOUNTER — OFFICE VISIT (OUTPATIENT)
Dept: FAMILY MEDICINE CLINIC | Facility: CLINIC | Age: 47
End: 2017-10-24

## 2017-10-24 ENCOUNTER — TREATMENT (OUTPATIENT)
Dept: PHYSICAL THERAPY | Facility: CLINIC | Age: 47
End: 2017-10-24

## 2017-10-24 VITALS
OXYGEN SATURATION: 98 % | WEIGHT: 112 LBS | BODY MASS INDEX: 21.99 KG/M2 | HEART RATE: 75 BPM | DIASTOLIC BLOOD PRESSURE: 90 MMHG | TEMPERATURE: 98.3 F | RESPIRATION RATE: 16 BRPM | HEIGHT: 60 IN | SYSTOLIC BLOOD PRESSURE: 164 MMHG

## 2017-10-24 DIAGNOSIS — I10 ESSENTIAL HYPERTENSION: Primary | ICD-10-CM

## 2017-10-24 DIAGNOSIS — T79.A22D: Primary | ICD-10-CM

## 2017-10-24 PROCEDURE — 99213 OFFICE O/P EST LOW 20 MIN: CPT | Performed by: FAMILY MEDICINE

## 2017-10-24 PROCEDURE — 97530 THERAPEUTIC ACTIVITIES: CPT | Performed by: PHYSICAL THERAPIST

## 2017-10-24 PROCEDURE — 97110 THERAPEUTIC EXERCISES: CPT | Performed by: PHYSICAL THERAPIST

## 2017-10-24 RX ORDER — NEBIVOLOL 5 MG/1
5 TABLET ORAL DAILY
Qty: 30 TABLET | Refills: 1 | COMMUNITY
Start: 2017-10-24 | End: 2017-11-07 | Stop reason: SDUPTHER

## 2017-10-24 NOTE — PATIENT INSTRUCTIONS
Monitor BP at home on by systolic 5 mg.  If not down in the 120s to 30s increased to 10 mg.  Recheck 6 weeks time call with any concerns

## 2017-10-24 NOTE — PROGRESS NOTES
Physical Therapy Daily Progress Note        Visit # : 8  Cindy Salas reports: I was really active over the weekend - my shin did not bother me to much - I can tell the swelling is getting better in my hands so probably improving in my shins/ankles as well     Subjective     Objective       Palpation     Right Tenderness of the anterior tibialis and medial gastrocnemius.      See Exercise, Manual, and Modality Logs for complete treatment.       Assessment & Plan     Assessment  Assessment details: Presents with slightly improved muscle tone left medial shin and palpable edema.  Tolerated all ROM/ Strength progressions well today. Cont PT 2x per week for mobility and strength- next visit attempt leg press if tolerated.         Progress strengthening /stabilization /functional activity           Manual Therapy:    8     mins  51960;  Therapeutic Exercise:    10     mins  75608;     Neuromuscular Kolby:        mins  89540;    Therapeutic Activity:     9     mins  19199;     Gait Training:           mins  06463;     Ultrasound:     8     mins  12085;    Electrical Stimulation:         mins  98209 ( );  Dry Needling          mins self-pay    Timed Treatment:   35   mins   Total Treatment:     45   mins    Yamilet Vallecillo, PT  Physical Therapist  [place] License # [id number]

## 2017-10-24 NOTE — PROGRESS NOTES
Subjective   Cindy Salas is a 47 y.o. female.   She  is here today to f/u on   Chief Complaint   Patient presents with   • Edema     face,hands,knees,ankles   .     History of Present Illness   Ms. Salas comes back in to follow-up on hypertension.  She was originally on losartan/HCTZ.  Developed flushing and swelling in her face and upper chest and tingling sensations.  That was stopped and she was started on Coreg.  She felt very good on that however started becoming edematous in her hands and feet.  HCTZ was called in and then she began to notice fullness.  Orally and around her nose.  It also did not resolve the fluid issue.  She stopped both the Coreg and the HCTZ and has been on no blood pressure medicine for approximately a week.  Monitoring BP at home it's been in the 140s up to 150.  She has felt pretty good.  Today we will start by systolic 5 mg she will continue to monitor at home if she does accumulate fluid we would try Lasix.  The following portions of the patient's history were reviewed and updated as appropriate: current medications, past family history, past medical history, past social history, past surgical history and problem list.    Review of Systems   Constitutional: Negative.    HENT: Negative.    Eyes: Negative.    Respiratory: Negative.    Cardiovascular: Negative.         Swelling in hands and feet and somearound nose and perioral.   Gastrointestinal: Negative.    Endocrine: Negative.    Genitourinary: Negative.    Musculoskeletal: Negative.    Skin: Negative.    Allergic/Immunologic: Negative.    Neurological: Negative.    Hematological: Negative.    Psychiatric/Behavioral: Negative.    All other systems reviewed and are negative.      Objective    Vitals:    10/24/17 1437   BP: 164/90   Pulse: 75   Resp: 16   Temp: 98.3 °F (36.8 °C)   SpO2: 98%   Body mass index is 21.87 kg/(m^2).  Allergies   Allergen Reactions   • Losartan Potassium-Hctz Swelling   • Morphine And Related Itching   •  Augmentin [Amoxicillin-Pot Clavulanate] Rash       BP Readings from Last 3 Encounters:   10/24/17 164/90   09/05/17 166/90   06/29/17 140/70       Physical Exam   Constitutional: She is oriented to person, place, and time. She appears well-developed and well-nourished.   Neck: Neck supple.   Cardiovascular: Normal rate, regular rhythm, normal heart sounds and intact distal pulses.    Pulmonary/Chest: Effort normal and breath sounds normal.   Musculoskeletal: Normal range of motion.   Neurological: She is alert and oriented to person, place, and time.   Skin: Skin is warm and dry.   Psychiatric: She has a normal mood and affect. Her behavior is normal. Judgment and thought content normal.   Nursing note and vitals reviewed.      Invalid input(s): 2W    Assessment/Plan   Cindy was seen today for edema.    Diagnoses and all orders for this visit:    Essential hypertension    Other orders  -     nebivolol (BYSTOLIC) 5 MG tablet; Take 1 tablet by mouth Daily.        Patient Instructions   Monitor BP at home on by systolic 5 mg.  If not down in the 120s to 30s increased to 10 mg.  Recheck 6 weeks time call with any concerns

## 2017-10-30 ENCOUNTER — APPOINTMENT (OUTPATIENT)
Dept: PREADMISSION TESTING | Facility: HOSPITAL | Age: 47
End: 2017-10-30

## 2017-10-30 VITALS
BODY MASS INDEX: 21.99 KG/M2 | HEART RATE: 79 BPM | RESPIRATION RATE: 20 BRPM | OXYGEN SATURATION: 100 % | DIASTOLIC BLOOD PRESSURE: 84 MMHG | TEMPERATURE: 97.9 F | SYSTOLIC BLOOD PRESSURE: 153 MMHG | WEIGHT: 112 LBS | HEIGHT: 60 IN

## 2017-10-30 LAB
ALBUMIN SERPL-MCNC: 4.9 G/DL (ref 3.5–5.2)
ALBUMIN/GLOB SERPL: 1.7 G/DL
ALP SERPL-CCNC: 45 U/L (ref 39–117)
ALT SERPL W P-5'-P-CCNC: 15 U/L (ref 1–33)
ANION GAP SERPL CALCULATED.3IONS-SCNC: 14 MMOL/L
AST SERPL-CCNC: 18 U/L (ref 1–32)
BASOPHILS # BLD AUTO: 0.02 10*3/MM3 (ref 0–0.2)
BASOPHILS NFR BLD AUTO: 0.4 % (ref 0–1.5)
BILIRUB SERPL-MCNC: 0.3 MG/DL (ref 0.1–1.2)
BUN BLD-MCNC: 15 MG/DL (ref 6–20)
BUN/CREAT SERPL: 24.2 (ref 7–25)
CALCIUM SPEC-SCNC: 9.8 MG/DL (ref 8.6–10.5)
CHLORIDE SERPL-SCNC: 101 MMOL/L (ref 98–107)
CO2 SERPL-SCNC: 26 MMOL/L (ref 22–29)
CREAT BLD-MCNC: 0.62 MG/DL (ref 0.57–1)
DEPRECATED RDW RBC AUTO: 42.5 FL (ref 37–54)
EOSINOPHIL # BLD AUTO: 0.07 10*3/MM3 (ref 0–0.7)
EOSINOPHIL NFR BLD AUTO: 1.4 % (ref 0.3–6.2)
ERYTHROCYTE [DISTWIDTH] IN BLOOD BY AUTOMATED COUNT: 11.4 % (ref 11.7–13)
GFR SERPL CREATININE-BSD FRML MDRD: 102 ML/MIN/1.73
GLOBULIN UR ELPH-MCNC: 2.9 GM/DL
GLUCOSE BLD-MCNC: 84 MG/DL (ref 65–99)
HCT VFR BLD AUTO: 41.5 % (ref 35.6–45.5)
HGB BLD-MCNC: 13.7 G/DL (ref 11.9–15.5)
IMM GRANULOCYTES # BLD: 0 10*3/MM3 (ref 0–0.03)
IMM GRANULOCYTES NFR BLD: 0 % (ref 0–0.5)
LYMPHOCYTES # BLD AUTO: 1.67 10*3/MM3 (ref 0.9–4.8)
LYMPHOCYTES NFR BLD AUTO: 32.6 % (ref 19.6–45.3)
MCH RBC QN AUTO: 33.5 PG (ref 26.9–32)
MCHC RBC AUTO-ENTMCNC: 33 G/DL (ref 32.4–36.3)
MCV RBC AUTO: 101.5 FL (ref 80.5–98.2)
MONOCYTES # BLD AUTO: 0.38 10*3/MM3 (ref 0.2–1.2)
MONOCYTES NFR BLD AUTO: 7.4 % (ref 5–12)
NEUTROPHILS # BLD AUTO: 2.99 10*3/MM3 (ref 1.9–8.1)
NEUTROPHILS NFR BLD AUTO: 58.2 % (ref 42.7–76)
PLATELET # BLD AUTO: 219 10*3/MM3 (ref 140–500)
PMV BLD AUTO: 9.5 FL (ref 6–12)
POTASSIUM BLD-SCNC: 4 MMOL/L (ref 3.5–5.2)
PROT SERPL-MCNC: 7.8 G/DL (ref 6–8.5)
RBC # BLD AUTO: 4.09 10*6/MM3 (ref 3.9–5.2)
SODIUM BLD-SCNC: 141 MMOL/L (ref 136–145)
WBC NRBC COR # BLD: 5.13 10*3/MM3 (ref 4.5–10.7)

## 2017-10-30 PROCEDURE — 36415 COLL VENOUS BLD VENIPUNCTURE: CPT

## 2017-10-30 PROCEDURE — 80053 COMPREHEN METABOLIC PANEL: CPT | Performed by: ORTHOPAEDIC SURGERY

## 2017-10-30 PROCEDURE — 93010 ELECTROCARDIOGRAM REPORT: CPT | Performed by: INTERNAL MEDICINE

## 2017-10-30 PROCEDURE — 93005 ELECTROCARDIOGRAM TRACING: CPT

## 2017-10-30 PROCEDURE — 85025 COMPLETE CBC W/AUTO DIFF WBC: CPT | Performed by: ORTHOPAEDIC SURGERY

## 2017-10-30 RX ORDER — ACETAMINOPHEN, ASPIRIN AND CAFFEINE 250; 250; 65 MG/1; MG/1; MG/1
1 TABLET, FILM COATED ORAL EVERY 6 HOURS PRN
COMMUNITY

## 2017-10-30 RX ORDER — FLUTICASONE PROPIONATE 50 MCG
2 SPRAY, SUSPENSION (ML) NASAL DAILY
COMMUNITY

## 2017-10-30 RX ORDER — FEXOFENADINE HCL 180 MG/1
180 TABLET ORAL DAILY
COMMUNITY

## 2017-11-06 ENCOUNTER — TELEPHONE (OUTPATIENT)
Dept: FAMILY MEDICINE CLINIC | Facility: CLINIC | Age: 47
End: 2017-11-06

## 2017-11-06 ENCOUNTER — ANESTHESIA (OUTPATIENT)
Dept: PERIOP | Facility: HOSPITAL | Age: 47
End: 2017-11-06

## 2017-11-06 ENCOUNTER — HOSPITAL ENCOUNTER (OUTPATIENT)
Facility: HOSPITAL | Age: 47
Setting detail: HOSPITAL OUTPATIENT SURGERY
Discharge: HOME | End: 2017-11-06
Attending: ORTHOPAEDIC SURGERY | Admitting: ORTHOPAEDIC SURGERY

## 2017-11-06 ENCOUNTER — ANESTHESIA EVENT (OUTPATIENT)
Dept: PERIOP | Facility: HOSPITAL | Age: 47
End: 2017-11-06

## 2017-11-06 VITALS
RESPIRATION RATE: 16 BRPM | OXYGEN SATURATION: 100 % | SYSTOLIC BLOOD PRESSURE: 125 MMHG | TEMPERATURE: 97.4 F | HEART RATE: 63 BPM | DIASTOLIC BLOOD PRESSURE: 78 MMHG

## 2017-11-06 DIAGNOSIS — D49.2 SOFT TISSUE TUMOR OF RIGHT FOOT: ICD-10-CM

## 2017-11-06 PROCEDURE — 25010000002 MIDAZOLAM PER 1 MG: Performed by: NURSE ANESTHETIST, CERTIFIED REGISTERED

## 2017-11-06 PROCEDURE — 25010000002 MIDAZOLAM PER 1 MG: Performed by: ANESTHESIOLOGY

## 2017-11-06 PROCEDURE — 88304 TISSUE EXAM BY PATHOLOGIST: CPT | Performed by: ORTHOPAEDIC SURGERY

## 2017-11-06 PROCEDURE — 25010000002 DEXAMETHASONE PER 1 MG: Performed by: NURSE ANESTHETIST, CERTIFIED REGISTERED

## 2017-11-06 PROCEDURE — 25010000002 ONDANSETRON PER 1 MG: Performed by: NURSE ANESTHETIST, CERTIFIED REGISTERED

## 2017-11-06 PROCEDURE — 25010000002 FENTANYL CITRATE (PF) 100 MCG/2ML SOLUTION: Performed by: NURSE ANESTHETIST, CERTIFIED REGISTERED

## 2017-11-06 PROCEDURE — 25010000002 PROPOFOL 10 MG/ML EMULSION: Performed by: NURSE ANESTHETIST, CERTIFIED REGISTERED

## 2017-11-06 RX ORDER — OXYCODONE AND ACETAMINOPHEN 7.5; 325 MG/1; MG/1
1 TABLET ORAL ONCE AS NEEDED
Status: DISCONTINUED | OUTPATIENT
Start: 2017-11-06 | End: 2017-11-06 | Stop reason: HOSPADM

## 2017-11-06 RX ORDER — ONDANSETRON 2 MG/ML
4 INJECTION INTRAMUSCULAR; INTRAVENOUS ONCE AS NEEDED
Status: DISCONTINUED | OUTPATIENT
Start: 2017-11-06 | End: 2017-11-06 | Stop reason: HOSPADM

## 2017-11-06 RX ORDER — BUPIVACAINE HYDROCHLORIDE 5 MG/ML
INJECTION, SOLUTION EPIDURAL; INTRACAUDAL AS NEEDED
Status: DISCONTINUED | OUTPATIENT
Start: 2017-11-06 | End: 2017-11-06 | Stop reason: HOSPADM

## 2017-11-06 RX ORDER — EPHEDRINE SULFATE 50 MG/ML
5 INJECTION, SOLUTION INTRAVENOUS ONCE AS NEEDED
Status: DISCONTINUED | OUTPATIENT
Start: 2017-11-06 | End: 2017-11-06 | Stop reason: HOSPADM

## 2017-11-06 RX ORDER — MIDAZOLAM HYDROCHLORIDE 1 MG/ML
1 INJECTION INTRAMUSCULAR; INTRAVENOUS
Status: DISCONTINUED | OUTPATIENT
Start: 2017-11-06 | End: 2017-11-06 | Stop reason: HOSPADM

## 2017-11-06 RX ORDER — HYDROMORPHONE HYDROCHLORIDE 1 MG/ML
0.5 INJECTION, SOLUTION INTRAMUSCULAR; INTRAVENOUS; SUBCUTANEOUS
Status: DISCONTINUED | OUTPATIENT
Start: 2017-11-06 | End: 2017-11-06 | Stop reason: HOSPADM

## 2017-11-06 RX ORDER — PROMETHAZINE HYDROCHLORIDE 25 MG/1
12.5 TABLET ORAL ONCE AS NEEDED
Status: DISCONTINUED | OUTPATIENT
Start: 2017-11-06 | End: 2017-11-06 | Stop reason: HOSPADM

## 2017-11-06 RX ORDER — FENTANYL CITRATE 50 UG/ML
50 INJECTION, SOLUTION INTRAMUSCULAR; INTRAVENOUS
Status: DISCONTINUED | OUTPATIENT
Start: 2017-11-06 | End: 2017-11-06 | Stop reason: HOSPADM

## 2017-11-06 RX ORDER — CLINDAMYCIN PHOSPHATE 600 MG/50ML
600 INJECTION INTRAVENOUS EVERY 8 HOURS
Status: COMPLETED | OUTPATIENT
Start: 2017-11-06 | End: 2017-11-06

## 2017-11-06 RX ORDER — FAMOTIDINE 10 MG/ML
20 INJECTION, SOLUTION INTRAVENOUS ONCE
Status: COMPLETED | OUTPATIENT
Start: 2017-11-06 | End: 2017-11-06

## 2017-11-06 RX ORDER — DEXAMETHASONE SODIUM PHOSPHATE 4 MG/ML
INJECTION, SOLUTION INTRA-ARTICULAR; INTRALESIONAL; INTRAMUSCULAR; INTRAVENOUS; SOFT TISSUE AS NEEDED
Status: DISCONTINUED | OUTPATIENT
Start: 2017-11-06 | End: 2017-11-06 | Stop reason: SURG

## 2017-11-06 RX ORDER — PROMETHAZINE HYDROCHLORIDE 25 MG/1
25 SUPPOSITORY RECTAL ONCE AS NEEDED
Status: DISCONTINUED | OUTPATIENT
Start: 2017-11-06 | End: 2017-11-06 | Stop reason: HOSPADM

## 2017-11-06 RX ORDER — PROMETHAZINE HYDROCHLORIDE 25 MG/1
25 TABLET ORAL ONCE AS NEEDED
Status: DISCONTINUED | OUTPATIENT
Start: 2017-11-06 | End: 2017-11-06 | Stop reason: HOSPADM

## 2017-11-06 RX ORDER — DIPHENHYDRAMINE HYDROCHLORIDE 50 MG/ML
12.5 INJECTION INTRAMUSCULAR; INTRAVENOUS
Status: DISCONTINUED | OUTPATIENT
Start: 2017-11-06 | End: 2017-11-06 | Stop reason: HOSPADM

## 2017-11-06 RX ORDER — LABETALOL HYDROCHLORIDE 5 MG/ML
5 INJECTION, SOLUTION INTRAVENOUS
Status: DISCONTINUED | OUTPATIENT
Start: 2017-11-06 | End: 2017-11-06 | Stop reason: HOSPADM

## 2017-11-06 RX ORDER — FLUMAZENIL 0.1 MG/ML
0.2 INJECTION INTRAVENOUS AS NEEDED
Status: DISCONTINUED | OUTPATIENT
Start: 2017-11-06 | End: 2017-11-06 | Stop reason: HOSPADM

## 2017-11-06 RX ORDER — LIDOCAINE HYDROCHLORIDE 20 MG/ML
INJECTION, SOLUTION INFILTRATION; PERINEURAL AS NEEDED
Status: DISCONTINUED | OUTPATIENT
Start: 2017-11-06 | End: 2017-11-06 | Stop reason: SURG

## 2017-11-06 RX ORDER — SODIUM CHLORIDE 0.9 % (FLUSH) 0.9 %
1-10 SYRINGE (ML) INJECTION AS NEEDED
Status: DISCONTINUED | OUTPATIENT
Start: 2017-11-06 | End: 2017-11-06 | Stop reason: HOSPADM

## 2017-11-06 RX ORDER — PROPOFOL 10 MG/ML
VIAL (ML) INTRAVENOUS CONTINUOUS PRN
Status: DISCONTINUED | OUTPATIENT
Start: 2017-11-06 | End: 2017-11-06 | Stop reason: SURG

## 2017-11-06 RX ORDER — HYDROCODONE BITARTRATE AND ACETAMINOPHEN 7.5; 325 MG/1; MG/1
1 TABLET ORAL ONCE AS NEEDED
Status: DISCONTINUED | OUTPATIENT
Start: 2017-11-06 | End: 2017-11-06 | Stop reason: HOSPADM

## 2017-11-06 RX ORDER — SODIUM CHLORIDE, SODIUM LACTATE, POTASSIUM CHLORIDE, CALCIUM CHLORIDE 600; 310; 30; 20 MG/100ML; MG/100ML; MG/100ML; MG/100ML
9 INJECTION, SOLUTION INTRAVENOUS CONTINUOUS
Status: DISCONTINUED | OUTPATIENT
Start: 2017-11-06 | End: 2017-11-06 | Stop reason: HOSPADM

## 2017-11-06 RX ORDER — MIDAZOLAM HYDROCHLORIDE 1 MG/ML
2 INJECTION INTRAMUSCULAR; INTRAVENOUS
Status: DISCONTINUED | OUTPATIENT
Start: 2017-11-06 | End: 2017-11-06 | Stop reason: HOSPADM

## 2017-11-06 RX ORDER — MIDAZOLAM HYDROCHLORIDE 1 MG/ML
INJECTION INTRAMUSCULAR; INTRAVENOUS AS NEEDED
Status: DISCONTINUED | OUTPATIENT
Start: 2017-11-06 | End: 2017-11-06 | Stop reason: SURG

## 2017-11-06 RX ORDER — PROMETHAZINE HYDROCHLORIDE 25 MG/ML
12.5 INJECTION, SOLUTION INTRAMUSCULAR; INTRAVENOUS ONCE AS NEEDED
Status: DISCONTINUED | OUTPATIENT
Start: 2017-11-06 | End: 2017-11-06 | Stop reason: HOSPADM

## 2017-11-06 RX ORDER — HYDRALAZINE HYDROCHLORIDE 20 MG/ML
5 INJECTION INTRAMUSCULAR; INTRAVENOUS
Status: DISCONTINUED | OUTPATIENT
Start: 2017-11-06 | End: 2017-11-06 | Stop reason: HOSPADM

## 2017-11-06 RX ORDER — ONDANSETRON 2 MG/ML
INJECTION INTRAMUSCULAR; INTRAVENOUS AS NEEDED
Status: DISCONTINUED | OUTPATIENT
Start: 2017-11-06 | End: 2017-11-06 | Stop reason: SURG

## 2017-11-06 RX ORDER — NALOXONE HCL 0.4 MG/ML
0.2 VIAL (ML) INJECTION AS NEEDED
Status: DISCONTINUED | OUTPATIENT
Start: 2017-11-06 | End: 2017-11-06 | Stop reason: HOSPADM

## 2017-11-06 RX ORDER — FENTANYL CITRATE 50 UG/ML
INJECTION, SOLUTION INTRAMUSCULAR; INTRAVENOUS AS NEEDED
Status: DISCONTINUED | OUTPATIENT
Start: 2017-11-06 | End: 2017-11-06 | Stop reason: SURG

## 2017-11-06 RX ADMIN — DEXAMETHASONE SODIUM PHOSPHATE 6 MG: 4 INJECTION, SOLUTION INTRAMUSCULAR; INTRAVENOUS at 07:36

## 2017-11-06 RX ADMIN — MIDAZOLAM 2 MG: 1 INJECTION INTRAMUSCULAR; INTRAVENOUS at 06:47

## 2017-11-06 RX ADMIN — CLINDAMYCIN PHOSPHATE 600 MG: 12 INJECTION, SOLUTION INTRAVENOUS at 07:15

## 2017-11-06 RX ADMIN — FENTANYL CITRATE 25 MCG: 50 INJECTION INTRAMUSCULAR; INTRAVENOUS at 07:21

## 2017-11-06 RX ADMIN — LIDOCAINE HYDROCHLORIDE 40 MG: 20 INJECTION, SOLUTION INFILTRATION; PERINEURAL at 07:18

## 2017-11-06 RX ADMIN — SODIUM CHLORIDE, POTASSIUM CHLORIDE, SODIUM LACTATE AND CALCIUM CHLORIDE 9 ML/HR: 600; 310; 30; 20 INJECTION, SOLUTION INTRAVENOUS at 06:30

## 2017-11-06 RX ADMIN — MIDAZOLAM HYDROCHLORIDE 1 MG: 1 INJECTION, SOLUTION INTRAMUSCULAR; INTRAVENOUS at 07:23

## 2017-11-06 RX ADMIN — FENTANYL CITRATE 25 MCG: 50 INJECTION INTRAMUSCULAR; INTRAVENOUS at 07:28

## 2017-11-06 RX ADMIN — PROPOFOL 160 MCG/KG/MIN: 10 INJECTION, EMULSION INTRAVENOUS at 07:19

## 2017-11-06 RX ADMIN — FAMOTIDINE 20 MG: 10 INJECTION, SOLUTION INTRAVENOUS at 06:45

## 2017-11-06 RX ADMIN — MIDAZOLAM HYDROCHLORIDE 1 MG: 1 INJECTION, SOLUTION INTRAMUSCULAR; INTRAVENOUS at 07:18

## 2017-11-06 RX ADMIN — ONDANSETRON 4 MG: 2 INJECTION INTRAMUSCULAR; INTRAVENOUS at 07:54

## 2017-11-06 NOTE — ANESTHESIA POSTPROCEDURE EVALUATION
Patient: Cindy Salas    Procedure Summary     Date Anesthesia Start Anesthesia Stop Room / Location    11/06/17 0715 0804  [facility] /  [facility]       Procedure Diagnosis Surgeon Provider    EXCISION TUMOR RT FOREFOOT (Right ) No diagnosis on file. MD Nico Bee MD          Anesthesia Type: MAC  Last vitals  BP   125/78 (11/06/17 0815)   Temp   36.3 °C (97.4 °F) (11/06/17 0802)   Pulse   63 (11/06/17 0815)   Resp   16 (11/06/17 0815)     SpO2   100 % (11/06/17 0815)     Post Anesthesia Care and Evaluation    Patient location during evaluation: bedside  Patient participation: complete - patient participated  Level of consciousness: awake  Pain score: 1  Pain management: adequate  Airway patency: patent  Anesthetic complications: No anesthetic complications    Cardiovascular status: acceptable  Respiratory status: acceptable  Hydration status: acceptable    Comments: --------------------            11/06/17 0815     --------------------   BP:       125/78     Pulse:      63       Resp:       16       Temp:                SpO2:      100%     --------------------

## 2017-11-06 NOTE — OP NOTE
Preoperative diagnosis: Tumor right foot    Postoperative diagnosis: Same, pathology pending    Procedure: Excision tumor right foot    Monitored anesthesia care with ankle block by surgeon    Surgeon: Jairo Clarke M.D.    No assistant complication or drain.    Specimen: 6 x 8 x 20 mm tumor right foot, excised from tendon sheath and capsule    Technique: After appropriate preoperative consultation the patient's made comfortable in the supine position on the operating room table.  Parenteral antibiotics are given and a surgical pause was undertaken to identify appropriate patient surgical site and surgeon.  After IV sedation, 22 cc half percent Marcaine plain used establish right foot and ankle block regional anesthesia by surgeon.  The foot is gently exsanguinated and a well-padded right supramalleolar ankle tourniquet inflated to 250 mmHg.  Prep and drape were done in the usual sterile free fashion.    A longitudinal incision is made over the dorsal lateral aspect of the right forefoot near the base of the great toe through skin and dermis only.  Blunt dissection is taken deep to that protecting cuticular nerves.  The mass is identified and found to be longitudinally oriented.  It had its origin from the extensor hallucis longus tendon sheath and deep to the lateral capsule.  It seemed to be intimately involved with the dorsolateral branch cuticular nerve. .    Blunt dissection was done proximal to distal excising the mass in its entirety in continuity with its pseudocapsule.  Its stalk was cleanly and sharply divided and cauterized.  There was no attenuation of the extensor tendon by the tumor but this may have had a primary neural component, as explained preoperatively to the patient.  Wound was copiously irrigated and no other abnormalities seen.  Cautery was used judiciously to achieve hemostasis.  Circulation returned promptly upon releasing the tourniquet.  Pulsatile lavage was used.  Hemostasis was again  checked and the wound closed with 4-0 Prolene horizontal mattress and simple sutures.  A sterile dressing was applied with gentle Ace wrap compression over padding.  Patient left the operating room comfortable with her block in effect after having tolerated the procedure well.  The 6 x 8 x 20 mm specimen was divided on the back table and did have typical gelatinous clear contents.  This was sent for permanent histopathologic exam.

## 2017-11-06 NOTE — TELEPHONE ENCOUNTER
Pt called and requested a 90 days RX of her bystolic and a RX for lasix. I did not see a RX for Lasix on her list.

## 2017-11-06 NOTE — ANESTHESIA PREPROCEDURE EVALUATION
Anesthesia Evaluation     Patient summary reviewed and Nursing notes reviewed          Airway   Mallampati: I  no difficulty expected  Dental      Pulmonary - negative pulmonary ROS   Cardiovascular - negative cardio ROS    ECG reviewed        Neuro/Psych- negative ROS  GI/Hepatic/Renal/Endo - negative ROS     Musculoskeletal (-) negative ROS    Abdominal    Substance History - negative use     OB/GYN negative ob/gyn ROS         Other                                        Anesthesia Plan    ASA 2     MAC     Anesthetic plan and risks discussed with patient.

## 2017-11-06 NOTE — H&P
History & Physical       Patient: Cindy Salas    Date of Admission: 2017  5:58 AM    YOB: 1970    Medical Record Number: [id number]    Attending Physician: Jairo Clarke MD        Chief Complaints: enlarging mass right forefoot      History of Present Illness: 47 y.o. female presents with tumor right forefoot Onset of symptoms was gradual starting 4 months ago.  Symptoms are associated with pain.  Symptoms are aggravated by shoes.   Symptoms improve with nothing. Patient is now being admitted to the services of Jairo Clarke MD for further evaluation and treatment.     Allergies   Allergen Reactions   • Losartan Potassium-Hctz Swelling   • Morphine And Related Itching   • Augmentin [Amoxicillin-Pot Clavulanate] Rash       Medications:     Home Medications:  No current facility-administered medications on file prior to encounter.      Current Outpatient Prescriptions on File Prior to Encounter   Medication Sig   • montelukast (SINGULAIR) 10 MG tablet Take 1 tablet by mouth Daily. (Patient taking differently: Take 10 mg by mouth Every Morning.)   • valACYclovir (VALTREX) 1000 MG tablet 2 by mouth and repeat 1 time in 12 hours (Patient taking differently: As Needed (cold sore flare-up). 2 by mouth and repeat 1 time in 12 hours)     Current Medications:  Scheduled Meds:  clindamycin 600 mg Intravenous Q8H     Continuous Infusions:  lactated ringers 9 mL/hr Last Rate: 9 mL/hr (17 0630)     PRN Meds:.fentanyl  •  midazolam **OR** midazolam  •  sodium chloride       Past Medical History:   Diagnosis Date   • Arthritis    • Headache    • Hyperlipidemia    • Hypertension    • Migraine    • Spinal headache     following          Past Surgical History:   Procedure Laterality Date   • BREAST AUGMENTATION Bilateral    • BREAST SURGERY     •  SECTION     • HYSTERECTOMY          Social History     Occupational History   • Not on file.     Social History Main Topics   • Smoking  status: Former Smoker     Packs/day: 1.00     Years: 10.00     Types: Cigarettes     Quit date: 1997   • Smokeless tobacco: Never Used   • Alcohol use 3.6 oz/week     6 Glasses of wine per week   • Drug use: No   • Sexual activity: Not on file    Social History     Social History Narrative        Family History   Problem Relation Age of Onset   • Hypertension Mother    • Heart disease Father    • Cancer Father      prostate   • Hypertension Brother    • Hyperlipidemia Brother    • Malig Hyperthermia Neg Hx          Review of Systems:   HEENT: Patient denies any headaches, vision changes, change in hearing, or tinnitus, Patient denies any rhinorrhea,epistaxis, sinus pain, mouth or dental problems, sore throat or hoarseness, or dysphagia  Pulmonary: Patient denies any cough, congestion, SOA, or wheezing  Cardiovascular: Patient denies any chest pain, dyspnea, palpitations, weakness, intolerance of exercise, varicosities, swelling of extremities, known murmur  Gastrointestinal:  Patient denies nausea, vomiting, diarrhea, constipation, loss  of appetite, change in appetite, dysphagia, gas, heartburn, melena, change in bowel habits, use of laxatives or other drugs to alter the function of the gastrointestinal tract.  Genital/Urinary: Patient denies dysuria, change in color of urine, change in frequency of urination, pain with urgency, incontinence, retention, or nocturia.  Musculoskeletal: With the exception of the involved extremity, the patient denies increased warmth; redness; or swelling of joints; limitation of function; deformity; crepitation: pain in a joint or an extremity, the neck, or the back, especially with movement.  Neurological: Patient denies dizziness, tremor, ataxia, difficulty in speaking, change in speech, paresthesia, loss of sensation, seizures, syncope, changes in memory.  Endocrine system: Patient denies tremors, palpitations, intolerance of heat or cold, polyuria, polydipsia, polyphagia,  diaphoresis, exophthalmos, or goiter.  Psychological: Patient denies thoughts/plans or harming self or other; depression,  insomnia, night terrors, neetu, memory loss, disorientation.  Skin: Patient denies any bruising, rashes, discoloration, pruritus, wounds, or changes in the hair or nails.  Hematopoietic: Patient denies history of spontaneous or excessive bleeding, epistaxis, hematuria, melena, fatigue, enlarged or tender lymph nodes, pallor, history of anemia.    Physical Exam: 47 y.o. female  General Appearance:    Alert, cooperative, in no acute distress                    Vitals:    11/06/17 0630 11/06/17 0649   BP: 158/81    BP Location: Right arm    Patient Position: Lying    Pulse: 74 61   Resp: 16 16   Temp: 97.8 °F (36.6 °C)    TempSrc: Oral    SpO2: 99% 99%  Comment: post versed        Head:    Normocephalic, without obvious abnormality, atraumatic   Eyes:            Lids and lashes normal, conjunctivae and sclerae normal, no   icterus, no pallor, corneas clear, PERRLA   Ears:    Ears appear intact with no abnormalities noted   Throat:   No oral lesions, no thrush, oral mucosa moist   Neck:   No adenopathy, supple, trachea midline, no thyromegaly, no   carotid bruit, no JVD   Back:     No kyphosis present, no scoliosis present, no skin lesions,      erythema or scars, no tenderness to percussion or                   palpation,   range of motion normal   Lungs:     Clear to auscultation,respirations regular, even and                  unlabored    Heart:    Regular rhythm and normal rate, normal S1 and S2, no            murmur, no gallop, no rub, no click   Chest Wall:    No abnormalities observed   Abdomen:     Normal bowel sounds, no masses, no organomegaly, soft        non-tender, non-distended, no guarding, no rebound                tenderness   Rectal:     Deferred   Extremities:   Tenderness over right hallux  . Moves all extremities well, no edema,   no cyanosis, no redness   Pulses:   Pulses  palpable and equal bilaterally   Skin:   No bleeding, bruising or rash   Lymph nodes:   No palpable adenopathy   Neurologic:   Cranial nerves 2 - 12 grossly intact, sensation intact, DTR       present and equal bilaterally           Diagnostic Tests:    Results from last 7 days  Lab Units 10/30/17  1437   WBC 10*3/mm3 5.13   HEMOGLOBIN g/dL 13.7   HEMATOCRIT % 41.5   PLATELETS 10*3/mm3 219       Results from last 7 days  Lab Units 10/30/17  1437   SODIUM mmol/L 141   POTASSIUM mmol/L 4.0   CHLORIDE mmol/L 101   CO2 mmol/L 26.0   BUN mg/dL 15   CREATININE mg/dL 0.62   GLUCOSE mg/dL 84   CALCIUM mg/dL 9.8         No results found for: CRYSTAL]  No results found for: CULTURE]  No results found for: URICACID]    No results found.    Assessment:  Patient Active Problem List   Diagnosis   • Atopic rhinitis   • Hyperlipidemia   • Hypertension   • Osteoarthritis   • Lipoma of left upper extremity   • Recurrent herpes labialis           Plan:  The patient voiced understanding of the risks, benefits, and alternative forms of treatment that were discussed. All questions were answered and the patient consents to proceed with the procedure as planned.        Discharge Plan: today to home      Date: 11/6/2017    Jairo Clarke MD    EMR Dragon/Transcription disclaimer:   Much of this encounter note is an electronic transcription/translation of spoken language to printed text. The electronic translation of spoken language may permit erroneous, or at times, nonsensical words or phrases to be inadvertently transcribed; Although I have reviewed the note for such errors, some may still exist.

## 2017-11-06 NOTE — DISCHARGE INSTRUCTIONS
WHAT YOU CAN EXPECT  1. Swelling:  This is to be expected. It is difficult to specify what constitutes as an abnormal amount of swelling. You can minimize this by staying off your feet, keeping the foot elevated and applying ice.  2. Pain:  Everyone experiences pain, unfortunately, some worse than others.  You will be given a prescription for pain medication before you leave the hospital, either by Dr. Clarke or one of his assistants.  Please feel free to check with us if you are allergic to any pain medication. If you have had local anesthesia, start taking the medication when the anesthesia begins to wear off.  3. Bleeding:  This always occurs.  You will notice slight oozing occasionally through the bandages.  If bleeding continues and soaks through the dressing please call us.    WHAT TO DO AFTER SURGERY  1. When you return home you must rest.  You may either sit in a chair or in bed, with the foot elevated.  The position of the foot should be above the level of the heart.  Propping the foot up on a few pillows always helps.  2. Do not do any excessive or unnecessary walking during the first few days after surgery.  May get up during the first 48 hours only to eat and use the restroom.  Each operation is slightly different, and you may be specifically told that no walking is allowed at all for a period of time.  Under these circumstances, you will be best off using a crutch or walker.  3. If you are given a special shoe to use after surgery, you may not walk without it.  You do not, however, have to wear the shoe at night.  You will find it easier to commence walking on your heel and put more weight on the flat foot over the course of the next few days.  4. Use ice over the foot for about 24 hours after surgery.  The small commercially available ice packs are useless.  Fill a large garbage bag with ice and prop the bag over the foot, not on the ankle.  Alternatively, place the ice bag on the bed and turn on your  side with the foot lying directly on the ice pack.  5. If you are taking pain medication, there are common side effects such as dizziness, drowsiness, and nausea.  If these or an allergic reaction occur, stop taking the medication and call us.  To prevent nausea, eat prior to taking the medication.    DO NOT DO ANY OF THE FOLLOWING  1. Do not get the bandages wet.  When bathing, either sponge bathe or hang the foot over the side of the bath.  The safest is to get into the empty tub with the shoe on, elevate the foot, and then fill the tub.  Preferably, empty the tub prior to getting out.  Showering is possible with commercial plastic protectors.  2. Don't remove your dressing unless specifically instructed to do so. You may loosen the elastic wrap if needed for tightness.    *Please understand that the postoperative course varies from patient to patient, and these are only meant as guidelines to a smoother recovery.  These instructions do not cover all aspects of your post-operative care and recovery and if you have any questions which you feel are unanswered by this instruction sheet, please call us.    Please call us if you develop a fever associated with redness or drainage around the wound.  We are interested in your prompt and healthy recovery.  Please cooperate with the above instructions. Please call the office for a follow-up appt at [phone].

## 2017-11-07 LAB
CYTO UR: NORMAL
LAB AP CASE REPORT: NORMAL
Lab: NORMAL
PATH REPORT.FINAL DX SPEC: NORMAL
PATH REPORT.GROSS SPEC: NORMAL

## 2017-11-07 RX ORDER — NEBIVOLOL 5 MG/1
5 TABLET ORAL EVERY MORNING
Qty: 90 TABLET | Refills: 1 | Status: SHIPPED | OUTPATIENT
Start: 2017-11-07 | End: 2018-02-13 | Stop reason: SDUPTHER

## 2017-11-07 RX ORDER — NEBIVOLOL 5 MG/1
5 TABLET ORAL EVERY MORNING
Qty: 30 TABLET | Refills: 2 | Status: SHIPPED | OUTPATIENT
Start: 2017-11-07 | End: 2017-11-07 | Stop reason: SDUPTHER

## 2017-11-07 RX ORDER — FUROSEMIDE 20 MG/1
20 TABLET ORAL DAILY PRN
Qty: 30 TABLET | Refills: 2 | Status: SHIPPED | OUTPATIENT
Start: 2017-11-07 | End: 2017-11-07 | Stop reason: SDUPTHER

## 2017-11-07 RX ORDER — FUROSEMIDE 20 MG/1
20 TABLET ORAL DAILY PRN
Qty: 90 TABLET | Refills: 1 | Status: SHIPPED | OUTPATIENT
Start: 2017-11-07 | End: 2018-03-03 | Stop reason: SDUPTHER

## 2017-11-07 NOTE — TELEPHONE ENCOUNTER
Please see if she is taking 5 or 10 mg of by systolic.  Okay for Lasix 20 mg 1 by mouth daily when necessary edema #30 with 2 refills

## 2017-12-04 RX ORDER — FLUCONAZOLE 150 MG/1
TABLET ORAL
Qty: 2 TABLET | Refills: 0 | Status: SHIPPED | OUTPATIENT
Start: 2017-12-04 | End: 2018-02-13

## 2017-12-08 RX ORDER — FLUCONAZOLE 150 MG/1
TABLET ORAL
Qty: 2 TABLET | Refills: 0 | Status: SHIPPED | OUTPATIENT
Start: 2017-12-08 | End: 2017-12-29 | Stop reason: SDUPTHER

## 2017-12-29 RX ORDER — FLUCONAZOLE 150 MG/1
TABLET ORAL
Qty: 2 TABLET | Refills: 0 | Status: SHIPPED | OUTPATIENT
Start: 2017-12-29 | End: 2018-02-13

## 2018-02-05 DIAGNOSIS — Z00.00 ROUTINE ADULT HEALTH MAINTENANCE: ICD-10-CM

## 2018-02-05 DIAGNOSIS — Z00.00 ROUTINE ADULT HEALTH MAINTENANCE: Primary | ICD-10-CM

## 2018-02-06 LAB
ALBUMIN SERPL-MCNC: 5 G/DL (ref 3.5–5.2)
ALBUMIN/GLOB SERPL: 2.2 G/DL
ALP SERPL-CCNC: 44 U/L (ref 40–129)
ALT SERPL-CCNC: 12 U/L (ref 5–33)
AST SERPL-CCNC: 14 U/L (ref 5–32)
BASOPHILS # BLD AUTO: 0.02 10*3/MM3 (ref 0–0.2)
BASOPHILS NFR BLD AUTO: 0.6 % (ref 0–2)
BILIRUB SERPL-MCNC: 0.3 MG/DL (ref 0.2–1.2)
BUN SERPL-MCNC: 14 MG/DL (ref 6–20)
BUN/CREAT SERPL: 23.7 (ref 7–25)
CALCIUM SERPL-MCNC: 9.2 MG/DL (ref 8.6–10.5)
CHLORIDE SERPL-SCNC: 101 MMOL/L (ref 98–107)
CHOLEST SERPL-MCNC: 257 MG/DL (ref 0–200)
CO2 SERPL-SCNC: 28.3 MMOL/L (ref 22–29)
CREAT SERPL-MCNC: 0.59 MG/DL (ref 0.57–1)
EOSINOPHIL # BLD AUTO: 0.11 10*3/MM3 (ref 0.1–0.3)
EOSINOPHIL NFR BLD AUTO: 3.1 % (ref 0–4)
ERYTHROCYTE [DISTWIDTH] IN BLOOD BY AUTOMATED COUNT: 12.2 % (ref 11.5–14.5)
GFR SERPLBLD CREATININE-BSD FMLA CKD-EPI: 108 ML/MIN/1.73
GFR SERPLBLD CREATININE-BSD FMLA CKD-EPI: 131 ML/MIN/1.73
GLOBULIN SER CALC-MCNC: 2.3 GM/DL
GLUCOSE SERPL-MCNC: 93 MG/DL (ref 65–99)
HCT VFR BLD AUTO: 39.2 % (ref 37–47)
HDLC SERPL-MCNC: 96 MG/DL (ref 40–60)
HGB BLD-MCNC: 13.1 G/DL (ref 12–16)
IMM GRANULOCYTES # BLD: 0.01 10*3/MM3 (ref 0–0.03)
IMM GRANULOCYTES NFR BLD: 0.3 % (ref 0–0.5)
LDLC SERPL CALC-MCNC: 145 MG/DL (ref 0–100)
LYMPHOCYTES # BLD AUTO: 1.46 10*3/MM3 (ref 0.6–4.8)
LYMPHOCYTES NFR BLD AUTO: 40.7 % (ref 20–45)
MCH RBC QN AUTO: 33.7 PG (ref 27–31)
MCHC RBC AUTO-ENTMCNC: 33.4 G/DL (ref 31–37)
MCV RBC AUTO: 100.8 FL (ref 81–99)
MONOCYTES # BLD AUTO: 0.37 10*3/MM3 (ref 0–1)
MONOCYTES NFR BLD AUTO: 10.3 % (ref 3–8)
NEUTROPHILS # BLD AUTO: 1.62 10*3/MM3 (ref 1.5–8.3)
NEUTROPHILS NFR BLD AUTO: 45 % (ref 45–70)
NRBC BLD AUTO-RTO: 0 /100 WBC (ref 0–0)
PLATELET # BLD AUTO: 254 10*3/MM3 (ref 140–500)
POTASSIUM SERPL-SCNC: 4.4 MMOL/L (ref 3.5–5.2)
PROT SERPL-MCNC: 7.3 G/DL (ref 6–8.5)
RBC # BLD AUTO: 3.89 10*6/MM3 (ref 4.2–5.4)
SODIUM SERPL-SCNC: 141 MMOL/L (ref 136–145)
TRIGL SERPL-MCNC: 78 MG/DL (ref 0–150)
TSH SERPL DL<=0.005 MIU/L-ACNC: 1.88 MIU/ML (ref 0.27–4.2)
VLDLC SERPL CALC-MCNC: 15.6 MG/DL (ref 7–27)
WBC # BLD AUTO: 3.59 10*3/MM3 (ref 4.8–10.8)

## 2018-02-13 ENCOUNTER — OFFICE VISIT (OUTPATIENT)
Dept: FAMILY MEDICINE CLINIC | Facility: CLINIC | Age: 48
End: 2018-02-13

## 2018-02-13 VITALS
HEART RATE: 69 BPM | WEIGHT: 109 LBS | HEIGHT: 60 IN | DIASTOLIC BLOOD PRESSURE: 80 MMHG | BODY MASS INDEX: 21.4 KG/M2 | SYSTOLIC BLOOD PRESSURE: 136 MMHG | RESPIRATION RATE: 16 BRPM | OXYGEN SATURATION: 99 %

## 2018-02-13 DIAGNOSIS — Z00.00 ENCOUNTER FOR HEALTH MAINTENANCE EXAMINATION IN ADULT: Primary | ICD-10-CM

## 2018-02-13 DIAGNOSIS — I10 ESSENTIAL HYPERTENSION: ICD-10-CM

## 2018-02-13 DIAGNOSIS — E78.2 MIXED HYPERLIPIDEMIA: ICD-10-CM

## 2018-02-13 DIAGNOSIS — D75.89 MACROCYTOSIS WITHOUT ANEMIA: ICD-10-CM

## 2018-02-13 LAB
FOLATE SERPL-MCNC: >20 NG/ML (ref 4.78–20)
VIT B12 SERPL-MCNC: 751 PG/ML (ref 232–1245)

## 2018-02-13 PROCEDURE — 99396 PREV VISIT EST AGE 40-64: CPT | Performed by: FAMILY MEDICINE

## 2018-02-13 RX ORDER — NEBIVOLOL 5 MG/1
5 TABLET ORAL EVERY MORNING
Qty: 90 TABLET | Refills: 3 | Status: SHIPPED | OUTPATIENT
Start: 2018-02-13 | End: 2018-03-06 | Stop reason: SDUPTHER

## 2018-02-13 NOTE — PATIENT INSTRUCTIONS
Call with results of labs.  Recommend decrease dairy to help control her LDLs.  Risk factor of hypertension but otherwise she gets adequate exercise, is normotensive, is appropriate weight, nonsmoker.

## 2018-02-13 NOTE — PROGRESS NOTES
Subjective   Cindy Salas is a 47 y.o. female.   She  is here today to f/u on   Chief Complaint   Patient presents with   • Annual Exam   .     History of Present Illness   Mrs. Slaas is here for CPE.  Ongoing issues of hypertension, allergic rhinitis and hyperlipidemia.  She is very conscientious about her diet and exercise.  Exercise 1 hr qd plus other cardio.  She does not use all of her allergy medication every day.  Has a history of macrocytosis.  Has seen orthopedics recently for a soft tissue mass in the medial antecubital fossa of her left arm, musculoskeletal tightness in left posterior ankle.  The following portions of the patient's history were reviewed and updated as appropriate: current medications, past family history, past medical history, past social history, past surgical history and problem list.    Review of Systems   Constitutional: Negative.    HENT: Negative.    Eyes: Negative.    Respiratory: Negative.  Negative for shortness of breath.    Cardiovascular: Negative.  Negative for chest pain and palpitations.   Gastrointestinal: Negative.    Endocrine: Negative.    Genitourinary: Negative.    Musculoskeletal: Negative.  Negative for neck pain.   Skin: Negative.    Allergic/Immunologic: Negative.    Neurological: Negative.  Negative for headaches.   Hematological: Negative.    Psychiatric/Behavioral: Negative.    All other systems reviewed and are negative.      Objective    Vitals:    02/13/18 1100   BP: 136/80   Pulse: 69   Resp: 16   SpO2: 99%   Body mass index is 21.29 kg/(m^2).  Allergies   Allergen Reactions   • Losartan Potassium-Hctz Swelling   • Morphine And Related Itching   • Augmentin [Amoxicillin-Pot Clavulanate] Rash       BP Readings from Last 3 Encounters:   02/13/18 136/80   11/06/17 125/78   10/30/17 153/84       Physical Exam   Constitutional: She is oriented to person, place, and time. She appears well-developed and well-nourished.   HENT:   Head: Normocephalic and  atraumatic.   Right Ear: External ear normal.   Left Ear: External ear normal.   Nose: Nose normal.   Mouth/Throat: Oropharynx is clear and moist.   Eyes: Conjunctivae and EOM are normal. Pupils are equal, round, and reactive to light.   Neck: Normal range of motion. Neck supple. No thyromegaly present.   Cardiovascular: Normal rate, regular rhythm, normal heart sounds and intact distal pulses.    No murmur heard.  Pulmonary/Chest: Effort normal and breath sounds normal.   Abdominal: Soft. Bowel sounds are normal. She exhibits no distension. There is no hepatosplenomegaly. There is no tenderness. There is no rebound and no guarding.   Musculoskeletal: Normal range of motion. She exhibits no edema or tenderness.   Lymphadenopathy:     She has no cervical adenopathy.   Neurological: She is alert and oriented to person, place, and time. She has normal reflexes. No cranial nerve deficit. Coordination normal.   Skin: Skin is warm and dry.   Psychiatric: She has a normal mood and affect. Her behavior is normal. Judgment and thought content normal.   Nursing note and vitals reviewed.      Invalid input(s): 2W    Assessment/Plan   Cindy was seen today for annual exam.    Diagnoses and all orders for this visit:    Encounter for health maintenance examination in adult    Macrocytosis without anemia  -     Vitamin B12  -     Folate    Mixed hyperlipidemia    Essential hypertension    Other orders  -     nebivolol (BYSTOLIC) 5 MG tablet; Take 1 tablet by mouth Every Morning.        Patient Instructions   Call with results of labs.  Recommend decrease dairy to help control her LDLs.  Risk factor of hypertension but otherwise she gets adequate exercise, is normotensive, is appropriate weight, nonsmoker.          Orders Only on 02/05/2018   Component Date Value Ref Range Status   • WBC 02/06/2018 3.59* 4.80 - 10.80 10*3/mm3 Final   • RBC 02/06/2018 3.89* 4.20 - 5.40 10*6/mm3 Final   • Hemoglobin 02/06/2018 13.1  12.0 - 16.0 g/dL  Final   • Hematocrit 02/06/2018 39.2  37.0 - 47.0 % Final   • MCV 02/06/2018 100.8* 81.0 - 99.0 fL Final   • MCH 02/06/2018 33.7* 27.0 - 31.0 pg Final   • MCHC 02/06/2018 33.4  31.0 - 37.0 g/dL Final   • RDW 02/06/2018 12.2  11.5 - 14.5 % Final   • Platelets 02/06/2018 254  140 - 500 10*3/mm3 Final   • Neutrophil Rel % 02/06/2018 45.0  45.0 - 70.0 % Final   • Lymphocyte Rel % 02/06/2018 40.7  20.0 - 45.0 % Final   • Monocyte Rel % 02/06/2018 10.3* 3.0 - 8.0 % Final   • Eosinophil Rel % 02/06/2018 3.1  0.0 - 4.0 % Final   • Basophil Rel % 02/06/2018 0.6  0.0 - 2.0 % Final   • Neutrophils Absolute 02/06/2018 1.62  1.50 - 8.30 10*3/mm3 Final   • Lymphocytes Absolute 02/06/2018 1.46  0.60 - 4.80 10*3/mm3 Final   • Monocytes Absolute 02/06/2018 0.37  0.00 - 1.00 10*3/mm3 Final   • Eosinophils Absolute 02/06/2018 0.11  0.10 - 0.30 10*3/mm3 Final   • Basophils Absolute 02/06/2018 0.02  0.00 - 0.20 10*3/mm3 Final   • Immature Granulocyte Rel % 02/06/2018 0.3  0.0 - 0.5 % Final   • Immature Grans Absolute 02/06/2018 0.01  0.00 - 0.03 10*3/mm3 Final   • nRBC 02/06/2018 0.0  0.0 - 0.0 /100 WBC Final   • Glucose 02/06/2018 93  65 - 99 mg/dL Final   • BUN 02/06/2018 14  6 - 20 mg/dL Final   • Creatinine 02/06/2018 0.59  0.57 - 1.00 mg/dL Final   • eGFR Non  Am 02/06/2018 108  >60 mL/min/1.73 Final   • eGFR African Am 02/06/2018 131  >60 mL/min/1.73 Final   • BUN/Creatinine Ratio 02/06/2018 23.7  7.0 - 25.0 Final   • Sodium 02/06/2018 141  136 - 145 mmol/L Final   • Potassium 02/06/2018 4.4  3.5 - 5.2 mmol/L Final   • Chloride 02/06/2018 101  98 - 107 mmol/L Final   • Total CO2 02/06/2018 28.3  22.0 - 29.0 mmol/L Final   • Calcium 02/06/2018 9.2  8.6 - 10.5 mg/dL Final   • Total Protein 02/06/2018 7.3  6.0 - 8.5 g/dL Final   • Albumin 02/06/2018 5.00  3.50 - 5.20 g/dL Final   • Globulin 02/06/2018 2.3  gm/dL Final   • A/G Ratio 02/06/2018 2.2  g/dL Final   • Total Bilirubin 02/06/2018 0.3  0.2 - 1.2 mg/dL Final   •  Alkaline Phosphatase 02/06/2018 44  40 - 129 U/L Final   • AST (SGOT) 02/06/2018 14  5 - 32 U/L Final   • ALT (SGPT) 02/06/2018 12  5 - 33 U/L Final   • Total Cholesterol 02/06/2018 257* 0 - 200 mg/dL Final   • Triglycerides 02/06/2018 78  0 - 150 mg/dL Final   • HDL Cholesterol 02/06/2018 96* 40 - 60 mg/dL Final   • VLDL Cholesterol 02/06/2018 15.6  7 - 27 mg/dL Final   • LDL Cholesterol  02/06/2018 145* 0 - 100 mg/dL Final   • TSH 02/06/2018 1.880  0.270 - 4.200 mIU/mL Final

## 2018-02-15 ENCOUNTER — TELEPHONE (OUTPATIENT)
Dept: FAMILY MEDICINE CLINIC | Facility: CLINIC | Age: 48
End: 2018-02-15

## 2018-03-05 RX ORDER — FUROSEMIDE 20 MG/1
TABLET ORAL
Qty: 90 TABLET | Refills: 0 | Status: SHIPPED | OUTPATIENT
Start: 2018-03-05 | End: 2018-06-01 | Stop reason: SDUPTHER

## 2018-03-07 RX ORDER — NEBIVOLOL HYDROCHLORIDE 5 MG/1
TABLET ORAL
Qty: 90 TABLET | Refills: 1 | Status: SHIPPED | OUTPATIENT
Start: 2018-03-07

## 2018-04-02 ENCOUNTER — OFFICE VISIT (OUTPATIENT)
Dept: FAMILY MEDICINE CLINIC | Facility: CLINIC | Age: 48
End: 2018-04-02

## 2018-04-02 VITALS
HEIGHT: 60 IN | SYSTOLIC BLOOD PRESSURE: 122 MMHG | RESPIRATION RATE: 16 BRPM | WEIGHT: 113 LBS | BODY MASS INDEX: 22.19 KG/M2 | HEART RATE: 78 BPM | DIASTOLIC BLOOD PRESSURE: 70 MMHG | TEMPERATURE: 98.1 F | OXYGEN SATURATION: 98 %

## 2018-04-02 DIAGNOSIS — IMO0001 WOUND ABSCESS, INITIAL ENCOUNTER: Primary | ICD-10-CM

## 2018-04-02 DIAGNOSIS — Z23 NEED FOR DIPHTHERIA-TETANUS-PERTUSSIS (TDAP) VACCINE: ICD-10-CM

## 2018-04-02 PROBLEM — IMO0002 WOUND ABSCESS: Status: ACTIVE | Noted: 2018-04-02

## 2018-04-02 PROCEDURE — 90471 IMMUNIZATION ADMIN: CPT | Performed by: PHYSICIAN ASSISTANT

## 2018-04-02 PROCEDURE — 90715 TDAP VACCINE 7 YRS/> IM: CPT | Performed by: PHYSICIAN ASSISTANT

## 2018-04-02 PROCEDURE — 99212 OFFICE O/P EST SF 10 MIN: CPT | Performed by: PHYSICIAN ASSISTANT

## 2018-04-02 RX ORDER — CEPHALEXIN 500 MG/1
500 CAPSULE ORAL 2 TIMES DAILY
Qty: 20 CAPSULE | Refills: 0 | Status: SHIPPED | OUTPATIENT
Start: 2018-04-02 | End: 2018-04-09

## 2018-04-02 RX ORDER — FLUCONAZOLE 150 MG/1
150 TABLET ORAL DAILY
Qty: 2 TABLET | Refills: 0 | Status: SHIPPED | OUTPATIENT
Start: 2018-04-02

## 2018-04-02 NOTE — PROGRESS NOTES
"Subjective   Cindy Salas is a 47 y.o. female.   Chief Complaint   Patient presents with   • Pain     right ankle       History of Present Illness     Cindy is a 47-year-old female who presents with right ankle pain for the past 6 weeks.  She states about 6 weeks ago she fell out a railroad tie hat had a metal L shaped object.   She has had some  Redness and swelling.  She was in [facility] on March 12-21,2018.  Last tetanus shot has been more than 10 years.  She did not seek medical attention when she fell on the railroad tie 6 weeks ago.  Denied any fevers, chills, or discharge from wound.    The following portions of the patient's history were reviewed and updated as appropriate: allergies, current medications, past family history, past medical history, past social history and past surgical history.    Review of Systems   Constitutional: Negative.    HENT: Negative.    Eyes: Negative.    Respiratory: Negative.    Cardiovascular: Negative.    Gastrointestinal: Negative.    Endocrine: Negative.    Genitourinary: Negative.    Musculoskeletal: Positive for arthralgias (right ankle pain).   Skin: Positive for wound.   Allergic/Immunologic: Negative.    Neurological: Negative.    Hematological: Negative.    Psychiatric/Behavioral: Negative.    All other systems reviewed and are negative.      Social History     Social History   • Marital status:      Social History Main Topics   • Smoking status: Former Smoker     Packs/day: 1.00     Years: 10.00     Types: Cigarettes     Quit date: 1997   • Smokeless tobacco: Never Used   • Alcohol use 3.6 oz/week     6 Glasses of wine per week   • Drug use: No     Other Topics Concern   • Not on file     Vitals:    04/02/18 1403   BP: 122/70   BP Location: Right arm   Patient Position: Sitting   Cuff Size: Adult   Pulse: 78   Resp: 16   Temp: 98.1 °F (36.7 °C)   TempSrc: Oral   SpO2: 98%   Weight: 51.3 kg (113 lb)   Height: 152.4 cm (60\")       Wt Readings from Last 3 " Encounters:   04/02/18 51.3 kg (113 lb)   02/13/18 49.4 kg (109 lb)   10/30/17 50.8 kg (112 lb)       BP Readings from Last 3 Encounters:   04/02/18 122/70   02/13/18 136/80   11/06/17 125/78     Body mass index is 22.07 kg/m².  Allergies   Allergen Reactions   • Losartan Potassium-Hctz Swelling   • Morphine And Related Itching   • Augmentin [Amoxicillin-Pot Clavulanate] Rash       Objective   Physical Exam   Constitutional: She is oriented to person, place, and time. Vital signs are normal. She appears well-developed and well-nourished.   Neck: Phonation normal. No edema present.   Cardiovascular: Normal pulses.    Musculoskeletal:        Right ankle: She exhibits normal range of motion and no swelling. Achilles tendon normal.        Neurological: She is alert and oriented to person, place, and time.   Skin: Skin is warm, dry and intact.   Psychiatric: She has a normal mood and affect. Her speech is normal and behavior is normal. Judgment and thought content normal. Cognition and memory are normal.         Foreign Body Removal  Date/Time: 4/2/2018 5:23 PM  Performed by: ALESSIA ROBERSTON  Authorized by: ALESSIA ROBERTSON   Consent: Verbal consent obtained. Written consent obtained.  Risks and benefits: risks, benefits and alternatives were discussed  Consent given by: patient  Patient understanding: patient states understanding of the procedure being performed  Patient consent: the patient's understanding of the procedure matches consent given  Procedure consent: procedure consent matches procedure scheduled  Patient identity confirmed: verbally with patient  Intake: right ankle.  Anesthesia: local infiltration    Anesthesia:  Local Anesthetic: lidocaine 1% without epinephrine  Anesthetic total: 0.4 mL    Sedation:  Patient sedated: no  Patient restrained: no  Patient cooperative: yes  Complexity: simple  Objects recovered: 0  Post-procedure assessment: foreign body not removed  Patient tolerance: Patient tolerated  the procedure well with no immediate complications  Comments: She tolerated procedure well.  No FOB was found. A wound culture was collected and sent to lab.      Assessment/Plan   Cindy was seen today for pain.    Diagnoses and all orders for this visit:    Wound abscess, initial encounter  -     Anaerobic & Aerobic Culture ([facility] Only) - Swab, Ankle, Right; Future  -     Tdap Vaccine Greater Than or Equal To 8yo IM  -     cephalexin (KEFLEX) 500 MG capsule; Take 1 capsule by mouth 2 (Two) Times a Day.  -     fluconazole (DIFLUCAN) 150 MG tablet; Take 1 tablet by mouth Daily. As needed  -     Anaerobic & Aerobic Culture ([facility] Only) - Swab, Ankle, Right  -     Foreign Body Removal    Need for diphtheria-tetanus-pertussis (Tdap) vaccine      Mrs. Cindy Salas was seen at office visit with right ankle wound with mild cellulitis/abscess.  Culture was collected and sent to the laboratory for further evaluation.  I have prescribed Keflex antibiotic to pharmacy.  States she has been on in past without difficulty.  I have prescribed Diflucan antibiotic due to her getting yeast infections with antibiotic usage.  Cindy will be notified of test results when completed. Cindy has signed the Tdap consent and will received at office visit today. She will soak her foot and ankle in Epson salt daily.  She voiced understanding.  She'll return to office if no improvement.

## 2018-04-03 PROBLEM — Z23 NEED FOR DIPHTHERIA-TETANUS-PERTUSSIS (TDAP) VACCINE: Status: ACTIVE | Noted: 2018-04-03

## 2018-04-08 LAB
BACTERIA SPEC AEROBE CULT: ABNORMAL
BACTERIA SPEC ANAEROBE CULT: ABNORMAL
BACTERIA SPEC CULT: ABNORMAL
BACTERIA SPEC CULT: ABNORMAL
OTHER ANTIBIOTIC SUSC ISLT: ABNORMAL

## 2018-04-09 DIAGNOSIS — IMO0001 WOUND ABSCESS, INITIAL ENCOUNTER: Primary | ICD-10-CM

## 2018-04-09 RX ORDER — SULFAMETHOXAZOLE AND TRIMETHOPRIM 800; 160 MG/1; MG/1
1 TABLET ORAL 2 TIMES DAILY
Qty: 20 TABLET | Refills: 0 | Status: SHIPPED | OUTPATIENT
Start: 2018-04-09

## 2018-06-01 RX ORDER — FUROSEMIDE 20 MG/1
20 TABLET ORAL DAILY PRN
Qty: 90 TABLET | Refills: 0 | Status: SHIPPED | OUTPATIENT
Start: 2018-06-01

## 2018-09-10 RX ORDER — FUROSEMIDE 20 MG/1
20 TABLET ORAL DAILY PRN
Qty: 90 TABLET | Refills: 0 | OUTPATIENT
Start: 2018-09-10

## 2019-01-09 ENCOUNTER — IMMUNIZATION (OUTPATIENT)
Dept: RETAIL CLINIC | Facility: CLINIC | Age: 49
End: 2019-01-09

## 2019-01-09 DIAGNOSIS — Z23 NEED FOR VACCINATION: Primary | ICD-10-CM

## 2019-01-09 NOTE — PATIENT INSTRUCTIONS
"Influenza (Flu) Vaccine (Inactivated or Recombinant): What You Need to Know  1. Why get vaccinated?  Influenza (\"flu\") is a contagious disease that spreads around the [place] every year, usually between October and May.  Flu is caused by influenza viruses, and is spread mainly by coughing, sneezing, and close contact.  Anyone can get flu. Flu strikes suddenly and can last several days. Symptoms vary by age, but can include:  · fever/chills  · sore throat  · muscle aches  · fatigue  · cough  · headache  · runny or stuffy nose    Flu can also lead to pneumonia and blood infections, and cause diarrhea and seizures in children. If you have a medical condition, such as heart or lung disease, flu can make it worse.  Flu is more dangerous for some people. Infants and young children, people 65 years of age and older, pregnant women, and people with certain health conditions or a weakened immune system are at greatest risk.  Each year thousands of people in the [place] die from flu, and many more are hospitalized.  Flu vaccine can:  · keep you from getting flu,  · make flu less severe if you do get it, and  · keep you from spreading flu to your family and other people.  2. Inactivated and recombinant flu vaccines  A dose of flu vaccine is recommended every flu season. Children 6 months through 8 years of age may need two doses during the same flu season. Everyone else needs only one dose each flu season.  Some inactivated flu vaccines contain a very small amount of a mercury-based preservative called thimerosal. Studies have not shown thimerosal in vaccines to be harmful, but flu vaccines that do not contain thimerosal are available.  There is no live flu virus in flu shots. They cannot cause the flu.  There are many flu viruses, and they are always changing. Each year a new flu vaccine is made to protect against three or four viruses that are likely to cause disease in the upcoming flu season. But even when the " vaccine doesn't exactly match these viruses, it may still provide some protection.  Flu vaccine cannot prevent:  · flu that is caused by a virus not covered by the vaccine, or  · illnesses that look like flu but are not.    It takes about 2 weeks for protection to develop after vaccination, and protection lasts through the flu season.  3. Some people should not get this vaccine  Tell the person who is giving you the vaccine:  · If you have any severe, life-threatening allergies. If you ever had a life-threatening allergic reaction after a dose of flu vaccine, or have a severe allergy to any part of this vaccine, you may be advised not to get vaccinated. Most, but not all, types of flu vaccine contain a small amount of egg protein.  · If you ever had Guillain-Barré Syndrome (also called GBS). Some people with a history of GBS should not get this vaccine. This should be discussed with your doctor.  · If you are not feeling well. It is usually okay to get flu vaccine when you have a mild illness, but you might be asked to come back when you feel better.    4. Risks of a vaccine reaction  With any medicine, including vaccines, there is a chance of reactions. These are usually mild and go away on their own, but serious reactions are also possible.  Most people who get a flu shot do not have any problems with it.  Minor problems following a flu shot include:  · soreness, redness, or swelling where the shot was given  · hoarseness  · sore, red or itchy eyes  · cough  · fever  · aches  · headache  · itching  · fatigue    If these problems occur, they usually begin soon after the shot and last 1 or 2 days.  More serious problems following a flu shot can include the following:  · There may be a small increased risk of Guillain-Secor Syndrome (GBS) after inactivated flu vaccine. This risk has been estimated at 1 or 2 additional cases per million people vaccinated. This is much lower than the risk of severe complications from  flu, which can be prevented by flu vaccine.  · Young children who get the flu shot along with pneumococcal vaccine (PCV13) and/or DTaP vaccine at the same time might be slightly more likely to have a seizure caused by fever. Ask your doctor for more information. Tell your doctor if a child who is getting flu vaccine has ever had a seizure.    Problems that could happen after any injected vaccine:  · People sometimes faint after a medical procedure, including vaccination. Sitting or lying down for about 15 minutes can help prevent fainting, and injuries caused by a fall. Tell your doctor if you feel dizzy, or have vision changes or ringing in the ears.  · Some people get severe pain in the shoulder and have difficulty moving the arm where a shot was given. This happens very rarely.  · Any medication can cause a severe allergic reaction. Such reactions from a vaccine are very rare, estimated at about 1 in a million doses, and would happen within a few minutes to a few hours after the vaccination.  As with any medicine, there is a very remote chance of a vaccine causing a serious injury or death.  The safety of vaccines is always being monitored. For more information, visit: www.cdc.gov/vaccinesafety/  5. What if there is a serious reaction?  What should I look for?  Look for anything that concerns you, such as signs of a severe allergic reaction, very high fever, or unusual behavior.  Signs of a severe allergic reaction can include hives, swelling of the face and throat, difficulty breathing, a fast heartbeat, dizziness, and weakness. These would start a few minutes to a few hours after the vaccination.  What should I do?  · If you think it is a severe allergic reaction or other emergency that can't wait, call 9-1-1 and get the person to the nearest hospital. Otherwise, call your doctor.  · Reactions should be reported to the Vaccine Adverse Event Reporting System (VAERS). Your doctor should file this report, or you  can do it yourself through the VAERS web site at www.vaers.[facility].gov, or by calling 1-269.125.5042.  ? VAERS does not give medical advice.  6. The National Vaccine Injury Compensation Program  The National Vaccine Injury Compensation Program (VICP) is a federal program that was created to compensate people who may have been injured by certain vaccines.  Persons who believe they may have been injured by a vaccine can learn about the program and about filing a claim by calling 1-277.285.5110 or visiting the VICP website at www.[facility].gov/vaccinecompensation. There is a time limit to file a claim for compensation.  7. How can I learn more?  · Ask your healthcare provider. He or she can give you the vaccine package insert or suggest other sources of information.  · Call your local or state health department.  · Contact the Centers for Disease Control and Prevention (CDC):  ? Call 1-686.969.3866 (7-922-XSI-INFO) or  ? Visit CDC's website at www.cdc.gov/flu  Vaccine Information Statement, Inactivated Influenza Vaccine (08/07/2015)  This information is not intended to replace advice given to you by your health care provider. Make sure you discuss any questions you have with your health care provider.  Document Released: 10/12/2007 Document Revised: 09/07/2017 Document Reviewed: 09/07/2017  Elsevier Interactive Patient Education © 2017 [employer]

## 2019-05-13 ENCOUNTER — DOCTOR'S OFFICE (OUTPATIENT)
Dept: URBAN - NONMETROPOLITAN AREA CLINIC 1 | Facility: CLINIC | Age: 49
Setting detail: OPHTHALMOLOGY
End: 2019-05-13
Payer: COMMERCIAL

## 2019-05-13 ENCOUNTER — OPTICAL OFFICE (OUTPATIENT)
Dept: URBAN - NONMETROPOLITAN AREA CLINIC 4 | Facility: CLINIC | Age: 49
Setting detail: OPHTHALMOLOGY
End: 2019-05-13
Payer: COMMERCIAL

## 2019-05-13 DIAGNOSIS — H43.393: ICD-10-CM

## 2019-05-13 DIAGNOSIS — H52.223: ICD-10-CM

## 2019-05-13 DIAGNOSIS — H52.4: ICD-10-CM

## 2019-05-13 PROCEDURE — 92014 COMPRE OPH EXAM EST PT 1/>: CPT | Performed by: OPTOMETRIST

## 2019-05-13 PROCEDURE — V2020 VISION SVCS FRAMES PURCHASES: HCPCS | Performed by: OPTOMETRIST

## 2019-05-13 PROCEDURE — V2202 LENS SPHERE BIFOCAL 7.12-20.: HCPCS | Performed by: OPTOMETRIST

## 2019-05-13 PROCEDURE — 92015 DETERMINE REFRACTIVE STATE: CPT | Performed by: OPTOMETRIST

## 2019-05-13 PROCEDURE — V2784 LENS POLYCARB OR EQUAL: HCPCS | Performed by: OPTOMETRIST

## 2019-05-13 PROCEDURE — V2204 LENS SPHCY BIFOCAL 4.00D/2.1: HCPCS | Performed by: OPTOMETRIST

## 2019-05-13 ASSESSMENT — REFRACTION_MANIFEST
OD_SPHERE: -0.75
OS_VA3: 20/
OU_VA: 20/
OS_VA2: 20/
OS_AXIS: 080
OU_VA: 20/
OD_VA1: 20/25-2
OS_VA1: 20/25-2
OD_CYLINDER: -3.00
OS_CYLINDER: -2.50
OD_ADD: +2.25
OD_VA2: 20/25-2
OS_VA3: 20/
OD_VA2: 20/
OD_AXIS: 095
OS_VA2: 20/25-2
OS_ADD: +2.25
OD_VA3: 20/
OS_SPHERE: -1.25
OD_VA1: 20/
OS_VA1: 20/
OD_VA3: 20/

## 2019-05-13 ASSESSMENT — SPHEQUIV_DERIVED
OS_SPHEQUIV: -2
OD_SPHEQUIV: -1.375
OS_SPHEQUIV: -2.5
OD_SPHEQUIV: -2.25

## 2019-05-13 ASSESSMENT — REFRACTION_AUTOREFRACTION
OD_SPHERE: +0.25
OD_CYLINDER: -3.25
OD_AXIS: 096
OS_SPHERE: -1.00
OS_CYLINDER: -2.00
OS_AXIS: 081

## 2019-05-13 ASSESSMENT — REFRACTION_CURRENTRX
OD_VPRISM_DIRECTION: PROGS
OS_AXIS: 075
OS_ADD: +1.50
OD_CYLINDER: -2.00
OD_AXIS: 097
OS_CYLINDER: -2.00
OD_OVR_VA: 20/
OS_OVR_VA: 20/
OD_OVR_VA: 20/
OD_OVR_VA: 20/
OS_SPHERE: PLANO
OD_SPHERE: PLANO
OD_ADD: +1.50
OS_VPRISM_DIRECTION: PROGS

## 2019-05-13 ASSESSMENT — CONFRONTATIONAL VISUAL FIELD TEST (CVF)
OD_FINDINGS: FULL
OS_FINDINGS: FULL

## 2019-05-13 ASSESSMENT — VISUAL ACUITY
OS_BCVA: 20/40+2
OD_BCVA: 20/40

## 2019-05-20 ENCOUNTER — OPTICAL OFFICE (OUTPATIENT)
Dept: URBAN - NONMETROPOLITAN AREA CLINIC 4 | Facility: CLINIC | Age: 49
Setting detail: OPHTHALMOLOGY
End: 2019-05-20
Payer: COMMERCIAL

## 2019-05-20 DIAGNOSIS — H52.223: ICD-10-CM

## 2019-05-20 PROCEDURE — V2202 LENS SPHERE BIFOCAL 7.12-20.: HCPCS | Performed by: OPTOMETRIST

## 2019-05-20 PROCEDURE — V2784 LENS POLYCARB OR EQUAL: HCPCS | Performed by: OPTOMETRIST

## 2019-05-20 PROCEDURE — V2020 VISION SVCS FRAMES PURCHASES: HCPCS | Performed by: OPTOMETRIST

## 2019-05-20 PROCEDURE — V2204 LENS SPHCY BIFOCAL 4.00D/2.1: HCPCS | Performed by: OPTOMETRIST

## 2020-05-21 ENCOUNTER — TRANSCRIBE ORDERS (OUTPATIENT)
Dept: ADMINISTRATIVE | Facility: HOSPITAL | Age: 50
End: 2020-05-21

## 2020-05-21 DIAGNOSIS — J32.9 CHRONIC SINUSITIS, UNSPECIFIED LOCATION: Primary | ICD-10-CM

## 2020-05-28 ENCOUNTER — HOSPITAL ENCOUNTER (OUTPATIENT)
Dept: CT IMAGING | Facility: HOSPITAL | Age: 50
Discharge: HOME | End: 2020-05-28
Admitting: INTERNAL MEDICINE

## 2020-05-28 DIAGNOSIS — J32.9 CHRONIC SINUSITIS, UNSPECIFIED LOCATION: ICD-10-CM

## 2020-05-28 PROCEDURE — 70487 CT MAXILLOFACIAL W/DYE: CPT

## 2020-05-28 PROCEDURE — 0 IOPAMIDOL PER 1 ML: Performed by: INTERNAL MEDICINE

## 2020-05-28 RX ADMIN — IOPAMIDOL 100 ML: 755 INJECTION, SOLUTION INTRAVENOUS at 14:23

## 2020-07-23 ENCOUNTER — HOSPITAL ENCOUNTER (INPATIENT)
Facility: HOSPITAL | Age: 50
LOS: 3 days | Discharge: HOME HEALTH | DRG: 690 | End: 2020-07-26
Attending: EMERGENCY MEDICINE | Admitting: FAMILY MEDICINE
Payer: COMMERCIAL

## 2020-07-23 ENCOUNTER — APPOINTMENT (EMERGENCY)
Dept: CT IMAGING | Facility: HOSPITAL | Age: 50
DRG: 690 | End: 2020-07-23
Payer: COMMERCIAL

## 2020-07-23 DIAGNOSIS — R30.0 DYSURIA: Primary | ICD-10-CM

## 2020-07-23 DIAGNOSIS — N20.0 KIDNEY STONES: ICD-10-CM

## 2020-07-23 DIAGNOSIS — N30.01 ACUTE CYSTITIS WITH HEMATURIA: ICD-10-CM

## 2020-07-23 DIAGNOSIS — R33.9 URINARY RETENTION: ICD-10-CM

## 2020-07-23 DIAGNOSIS — N12 PYELONEPHRITIS: ICD-10-CM

## 2020-07-23 DIAGNOSIS — R31.9 HEMATURIA: ICD-10-CM

## 2020-07-23 PROBLEM — G89.4 CHRONIC PAIN SYNDROME: Status: ACTIVE | Noted: 2020-07-23

## 2020-07-23 PROBLEM — E66.01 MORBID OBESITY (HCC): Status: ACTIVE | Noted: 2020-07-23

## 2020-07-23 PROBLEM — J45.20 MILD INTERMITTENT ASTHMA WITHOUT COMPLICATION: Status: ACTIVE | Noted: 2020-07-23

## 2020-07-23 LAB
ALBUMIN SERPL BCP-MCNC: 3 G/DL (ref 3.5–5)
ALP SERPL-CCNC: 95 U/L (ref 46–116)
ALT SERPL W P-5'-P-CCNC: 35 U/L (ref 12–78)
ANION GAP SERPL CALCULATED.3IONS-SCNC: 9 MMOL/L (ref 4–13)
AST SERPL W P-5'-P-CCNC: 24 U/L (ref 5–45)
BACTERIA UR QL AUTO: ABNORMAL /HPF
BASOPHILS # BLD AUTO: 0.09 THOUSANDS/ΜL (ref 0–0.1)
BASOPHILS NFR BLD AUTO: 1 % (ref 0–1)
BILIRUB SERPL-MCNC: 0.46 MG/DL (ref 0.2–1)
BILIRUB UR QL STRIP: ABNORMAL
BUN SERPL-MCNC: 10 MG/DL (ref 5–25)
CALCIUM SERPL-MCNC: 9 MG/DL (ref 8.3–10.1)
CHLORIDE SERPL-SCNC: 105 MMOL/L (ref 100–108)
CLARITY UR: ABNORMAL
CO2 SERPL-SCNC: 25 MMOL/L (ref 21–32)
COLOR UR: YELLOW
CREAT SERPL-MCNC: 1.14 MG/DL (ref 0.6–1.3)
EOSINOPHIL # BLD AUTO: 0.45 THOUSAND/ΜL (ref 0–0.61)
EOSINOPHIL NFR BLD AUTO: 2 % (ref 0–6)
ERYTHROCYTE [DISTWIDTH] IN BLOOD BY AUTOMATED COUNT: 13.1 % (ref 11.6–15.1)
GFR SERPL CREATININE-BSD FRML MDRD: 55 ML/MIN/1.73SQ M
GLUCOSE SERPL-MCNC: 102 MG/DL (ref 65–140)
GLUCOSE UR STRIP-MCNC: NEGATIVE MG/DL
HCT VFR BLD AUTO: 46.9 % (ref 34.8–46.1)
HGB BLD-MCNC: 15.7 G/DL (ref 11.5–15.4)
HGB UR QL STRIP.AUTO: ABNORMAL
IMM GRANULOCYTES # BLD AUTO: 0.08 THOUSAND/UL (ref 0–0.2)
IMM GRANULOCYTES NFR BLD AUTO: 0 % (ref 0–2)
KETONES UR STRIP-MCNC: NEGATIVE MG/DL
LACTATE SERPL-SCNC: 1.1 MMOL/L (ref 0.5–2)
LEUKOCYTE ESTERASE UR QL STRIP: ABNORMAL
LYMPHOCYTES # BLD AUTO: 2.76 THOUSANDS/ΜL (ref 0.6–4.47)
LYMPHOCYTES NFR BLD AUTO: 15 % (ref 14–44)
MCH RBC QN AUTO: 29.2 PG (ref 26.8–34.3)
MCHC RBC AUTO-ENTMCNC: 33.5 G/DL (ref 31.4–37.4)
MCV RBC AUTO: 87 FL (ref 82–98)
MONOCYTES # BLD AUTO: 1.02 THOUSAND/ΜL (ref 0.17–1.22)
MONOCYTES NFR BLD AUTO: 6 % (ref 4–12)
NEUTROPHILS # BLD AUTO: 14.25 THOUSANDS/ΜL (ref 1.85–7.62)
NEUTS SEG NFR BLD AUTO: 76 % (ref 43–75)
NITRITE UR QL STRIP: POSITIVE
NON-SQ EPI CELLS URNS QL MICRO: ABNORMAL /HPF
NRBC BLD AUTO-RTO: 0 /100 WBCS
PH UR STRIP.AUTO: 6.5 [PH]
PLATELET # BLD AUTO: 227 THOUSANDS/UL (ref 149–390)
PMV BLD AUTO: 12.4 FL (ref 8.9–12.7)
POTASSIUM SERPL-SCNC: 4.1 MMOL/L (ref 3.5–5.3)
PROT SERPL-MCNC: 6.8 G/DL (ref 6.4–8.2)
PROT UR STRIP-MCNC: >=300 MG/DL
RBC # BLD AUTO: 5.38 MILLION/UL (ref 3.81–5.12)
RBC #/AREA URNS AUTO: ABNORMAL /HPF
SODIUM SERPL-SCNC: 139 MMOL/L (ref 136–145)
SP GR UR STRIP.AUTO: 1.02 (ref 1–1.03)
UROBILINOGEN UR QL STRIP.AUTO: 1 E.U./DL
WBC # BLD AUTO: 18.65 THOUSAND/UL (ref 4.31–10.16)
WBC #/AREA URNS AUTO: ABNORMAL /HPF

## 2020-07-23 PROCEDURE — 85025 COMPLETE CBC W/AUTO DIFF WBC: CPT | Performed by: PHYSICIAN ASSISTANT

## 2020-07-23 PROCEDURE — 74177 CT ABD & PELVIS W/CONTRAST: CPT

## 2020-07-23 PROCEDURE — 87086 URINE CULTURE/COLONY COUNT: CPT | Performed by: PHYSICIAN ASSISTANT

## 2020-07-23 PROCEDURE — 99222 1ST HOSP IP/OBS MODERATE 55: CPT | Performed by: FAMILY MEDICINE

## 2020-07-23 PROCEDURE — 96375 TX/PRO/DX INJ NEW DRUG ADDON: CPT

## 2020-07-23 PROCEDURE — 81001 URINALYSIS AUTO W/SCOPE: CPT | Performed by: PHYSICIAN ASSISTANT

## 2020-07-23 PROCEDURE — 99285 EMERGENCY DEPT VISIT HI MDM: CPT | Performed by: EMERGENCY MEDICINE

## 2020-07-23 PROCEDURE — 87077 CULTURE AEROBIC IDENTIFY: CPT | Performed by: PHYSICIAN ASSISTANT

## 2020-07-23 PROCEDURE — 96365 THER/PROPH/DIAG IV INF INIT: CPT

## 2020-07-23 PROCEDURE — 87040 BLOOD CULTURE FOR BACTERIA: CPT | Performed by: PHYSICIAN ASSISTANT

## 2020-07-23 PROCEDURE — 99285 EMERGENCY DEPT VISIT HI MDM: CPT

## 2020-07-23 PROCEDURE — 96366 THER/PROPH/DIAG IV INF ADDON: CPT

## 2020-07-23 PROCEDURE — 36415 COLL VENOUS BLD VENIPUNCTURE: CPT | Performed by: PHYSICIAN ASSISTANT

## 2020-07-23 PROCEDURE — 51798 US URINE CAPACITY MEASURE: CPT

## 2020-07-23 PROCEDURE — 87186 SC STD MICRODIL/AGAR DIL: CPT | Performed by: PHYSICIAN ASSISTANT

## 2020-07-23 PROCEDURE — 80053 COMPREHEN METABOLIC PANEL: CPT | Performed by: PHYSICIAN ASSISTANT

## 2020-07-23 PROCEDURE — 83605 ASSAY OF LACTIC ACID: CPT | Performed by: PHYSICIAN ASSISTANT

## 2020-07-23 RX ORDER — MORPHINE SULFATE 10 MG/ML
6 INJECTION, SOLUTION INTRAMUSCULAR; INTRAVENOUS ONCE
Status: COMPLETED | OUTPATIENT
Start: 2020-07-23 | End: 2020-07-23

## 2020-07-23 RX ORDER — ACETAMINOPHEN 325 MG/1
650 TABLET ORAL EVERY 6 HOURS PRN
Status: DISCONTINUED | OUTPATIENT
Start: 2020-07-23 | End: 2020-07-26 | Stop reason: HOSPADM

## 2020-07-23 RX ORDER — PHENAZOPYRIDINE HYDROCHLORIDE 100 MG/1
100 TABLET, FILM COATED ORAL ONCE
Status: COMPLETED | OUTPATIENT
Start: 2020-07-23 | End: 2020-07-23

## 2020-07-23 RX ORDER — ALBUTEROL SULFATE 90 UG/1
2 AEROSOL, METERED RESPIRATORY (INHALATION) EVERY 6 HOURS PRN
Status: DISCONTINUED | OUTPATIENT
Start: 2020-07-23 | End: 2020-07-26 | Stop reason: HOSPADM

## 2020-07-23 RX ORDER — ALBUTEROL SULFATE 90 UG/1
2 AEROSOL, METERED RESPIRATORY (INHALATION) 3 TIMES DAILY PRN
COMMUNITY
Start: 2019-01-28

## 2020-07-23 RX ORDER — LEVOFLOXACIN 5 MG/ML
750 INJECTION, SOLUTION INTRAVENOUS ONCE
Status: COMPLETED | OUTPATIENT
Start: 2020-07-23 | End: 2020-07-23

## 2020-07-23 RX ORDER — ONDANSETRON 2 MG/ML
4 INJECTION INTRAMUSCULAR; INTRAVENOUS EVERY 6 HOURS PRN
Status: DISCONTINUED | OUTPATIENT
Start: 2020-07-23 | End: 2020-07-26 | Stop reason: HOSPADM

## 2020-07-23 RX ORDER — FLUTICASONE PROPIONATE 220 UG/1
1 AEROSOL, METERED RESPIRATORY (INHALATION) EVERY 12 HOURS SCHEDULED
Status: DISCONTINUED | OUTPATIENT
Start: 2020-07-23 | End: 2020-07-26 | Stop reason: HOSPADM

## 2020-07-23 RX ORDER — LEVOTHYROXINE SODIUM 0.03 MG/1
25 TABLET ORAL DAILY
Status: DISCONTINUED | OUTPATIENT
Start: 2020-07-24 | End: 2020-07-26 | Stop reason: HOSPADM

## 2020-07-23 RX ORDER — POLYETHYLENE GLYCOL 3350 17 G/17G
17 POWDER, FOR SOLUTION ORAL DAILY PRN
Status: DISCONTINUED | OUTPATIENT
Start: 2020-07-23 | End: 2020-07-26 | Stop reason: HOSPADM

## 2020-07-23 RX ORDER — GABAPENTIN 600 MG/1
600 TABLET ORAL 2 TIMES DAILY
COMMUNITY

## 2020-07-23 RX ORDER — GABAPENTIN 300 MG/1
600 CAPSULE ORAL 2 TIMES DAILY
Status: DISCONTINUED | OUTPATIENT
Start: 2020-07-23 | End: 2020-07-26 | Stop reason: HOSPADM

## 2020-07-23 RX ORDER — MONTELUKAST SODIUM 10 MG/1
10 TABLET ORAL DAILY
Status: DISCONTINUED | OUTPATIENT
Start: 2020-07-24 | End: 2020-07-26 | Stop reason: HOSPADM

## 2020-07-23 RX ORDER — SODIUM CHLORIDE 9 MG/ML
50 INJECTION, SOLUTION INTRAVENOUS CONTINUOUS
Status: DISCONTINUED | OUTPATIENT
Start: 2020-07-23 | End: 2020-07-26 | Stop reason: HOSPADM

## 2020-07-23 RX ORDER — PANTOPRAZOLE SODIUM 20 MG/1
20 TABLET, DELAYED RELEASE ORAL
Status: DISCONTINUED | OUTPATIENT
Start: 2020-07-23 | End: 2020-07-26 | Stop reason: HOSPADM

## 2020-07-23 RX ORDER — TAMSULOSIN HYDROCHLORIDE 0.4 MG/1
0.4 CAPSULE ORAL
Status: DISCONTINUED | OUTPATIENT
Start: 2020-07-23 | End: 2020-07-26 | Stop reason: HOSPADM

## 2020-07-23 RX ORDER — LEVOTHYROXINE SODIUM 0.03 MG/1
25 TABLET ORAL DAILY
COMMUNITY
Start: 2020-01-22

## 2020-07-23 RX ORDER — PANTOPRAZOLE SODIUM 20 MG/1
20 TABLET, DELAYED RELEASE ORAL
COMMUNITY

## 2020-07-23 RX ORDER — TOPIRAMATE 25 MG/1
50 TABLET ORAL EVERY 12 HOURS SCHEDULED
Status: DISCONTINUED | OUTPATIENT
Start: 2020-07-23 | End: 2020-07-26 | Stop reason: HOSPADM

## 2020-07-23 RX ORDER — MONTELUKAST SODIUM 10 MG/1
10 TABLET ORAL DAILY
COMMUNITY
Start: 2020-05-01

## 2020-07-23 RX ORDER — TOPIRAMATE 50 MG/1
50 TABLET, FILM COATED ORAL EVERY 12 HOURS SCHEDULED
COMMUNITY

## 2020-07-23 RX ORDER — LEVOFLOXACIN 5 MG/ML
750 INJECTION, SOLUTION INTRAVENOUS EVERY 24 HOURS
Status: DISCONTINUED | OUTPATIENT
Start: 2020-07-24 | End: 2020-07-26 | Stop reason: HOSPADM

## 2020-07-23 RX ADMIN — SODIUM CHLORIDE 1000 ML: 0.9 INJECTION, SOLUTION INTRAVENOUS at 13:09

## 2020-07-23 RX ADMIN — LEVOFLOXACIN 750 MG: 5 INJECTION, SOLUTION INTRAVENOUS at 13:09

## 2020-07-23 RX ADMIN — MORPHINE SULFATE 6 MG: 10 INJECTION INTRAVENOUS at 13:09

## 2020-07-23 RX ADMIN — FLUTICASONE PROPIONATE 1 PUFF: 220 AEROSOL, METERED RESPIRATORY (INHALATION) at 20:59

## 2020-07-23 RX ADMIN — MORPHINE SULFATE 2 MG: 2 INJECTION, SOLUTION INTRAMUSCULAR; INTRAVENOUS at 19:38

## 2020-07-23 RX ADMIN — TAMSULOSIN HYDROCHLORIDE 0.4 MG: 0.4 CAPSULE ORAL at 17:15

## 2020-07-23 RX ADMIN — IOHEXOL 100 ML: 350 INJECTION, SOLUTION INTRAVENOUS at 13:39

## 2020-07-23 RX ADMIN — GABAPENTIN 600 MG: 300 CAPSULE ORAL at 17:15

## 2020-07-23 RX ADMIN — PHENAZOPYRIDINE 100 MG: 100 TABLET ORAL at 13:09

## 2020-07-23 RX ADMIN — TOPIRAMATE 50 MG: 25 TABLET, FILM COATED ORAL at 20:59

## 2020-07-23 RX ADMIN — SODIUM CHLORIDE 125 ML/HR: 0.9 INJECTION, SOLUTION INTRAVENOUS at 17:14

## 2020-07-23 RX ADMIN — PANTOPRAZOLE SODIUM 20 MG: 20 TABLET, DELAYED RELEASE ORAL at 17:15

## 2020-07-23 NOTE — ED ATTENDING ATTESTATION
7/23/2020  IZonia MD, saw and evaluated the patient  I have discussed the patient with the resident/non-physician practitioner and agree with the resident's/non-physician practitioner's findings, Plan of Care, and MDM as documented in the resident's/non-physician practitioner's note, except where noted  All available labs and Radiology studies were reviewed  I was present for key portions of any procedure(s) performed by the resident/non-physician practitioner and I was immediately available to provide assistance  At this point I agree with the current assessment done in the Emergency Department      ED Course         Critical Care Time  Procedures

## 2020-07-23 NOTE — PLAN OF CARE
Problem: PAIN - ADULT  Goal: Verbalizes/displays adequate comfort level or baseline comfort level  Description  Interventions:  - Encourage patient to monitor pain and request assistance  - Assess pain using appropriate pain scale  - Administer analgesics based on type and severity of pain and evaluate response  - Implement non-pharmacological measures as appropriate and evaluate response  - Consider cultural and social influences on pain and pain management  - Notify physician/advanced practitioner if interventions unsuccessful or patient reports new pain  Outcome: Progressing     Problem: INFECTION - ADULT  Goal: Absence or prevention of progression during hospitalization  Description  INTERVENTIONS:  - Assess and monitor for signs and symptoms of infection  - Monitor lab/diagnostic results  - Monitor all insertion sites, i e  indwelling lines, tubes, and drains  - Monitor endotracheal if appropriate and nasal secretions for changes in amount and color  - [place] appropriate cooling/warming therapies per order  - Administer medications as ordered  - Instruct and encourage patient and family to use good hand hygiene technique  - Identify and instruct in appropriate isolation precautions for identified infection/condition  Outcome: Progressing  Goal: Absence of fever/infection during neutropenic period  Description  INTERVENTIONS:  - Monitor WBC    Outcome: Progressing     Problem: SAFETY ADULT  Goal: Patient will remain free of falls  Description  INTERVENTIONS:  - Assess patient frequently for physical needs  -  Identify cognitive and physical deficits and behaviors that affect risk of falls    -  [place] fall precautions as indicated by assessment   - Educate patient/family on patient safety including physical limitations  - Instruct patient to call for assistance with activity based on assessment  - Modify environment to reduce risk of injury  - Consider OT/PT consult to assist with strengthening/mobility  Outcome: Progressing  Goal: Maintain or return to baseline ADL function  Description  INTERVENTIONS:  -  Assess patient's ability to carry out ADLs; assess patient's baseline for ADL function and identify physical deficits which impact ability to perform ADLs (bathing, care of mouth/teeth, toileting, grooming, dressing, etc )  - Assess/evaluate cause of self-care deficits   - Assess range of motion  - Assess patient's mobility; develop plan if impaired  - Assess patient's need for assistive devices and provide as appropriate  - Encourage maximum independence but intervene and supervise when necessary  - Involve family in performance of ADLs  - Assess for home care needs following discharge   - Consider OT consult to assist with ADL evaluation and planning for discharge  - Provide patient education as appropriate  Outcome: Progressing  Goal: Maintain or return mobility status to optimal level  Description  INTERVENTIONS:  - Assess patient's baseline mobility status (ambulation, transfers, stairs, etc )    - Identify cognitive and physical deficits and behaviors that affect mobility  - Identify mobility aids required to assist with transfers and/or ambulation (gait belt, sit-to-stand, lift, walker, cane, etc )  - [place] fall precautions as indicated by assessment  - Record patient progress and toleration of activity level on Mobility SBAR; progress patient to next Phase/Stage  - Instruct patient to call for assistance with activity based on assessment  - Consider rehabilitation consult to assist with strengthening/weightbearing, etc   Outcome: Progressing     Problem: DISCHARGE PLANNING  Goal: Discharge to home or other facility with appropriate resources  Description  INTERVENTIONS:  - Identify barriers to discharge w/patient and caregiver  - Arrange for needed discharge resources and transportation as appropriate  - Identify discharge learning needs (meds, wound care, etc )  - Arrange for interpretive services to assist at discharge as needed  - Refer to Case Management Department for coordinating discharge planning if the patient needs post-hospital services based on physician/advanced practitioner order or complex needs related to functional status, cognitive ability, or social support system  Outcome: Progressing     Problem: Knowledge Deficit  Goal: Patient/family/caregiver demonstrates understanding of disease process, treatment plan, medications, and discharge instructions  Description  Complete learning assessment and assess knowledge base  Interventions:  - Provide teaching at level of understanding  - Provide teaching via preferred learning methods  Outcome: Progressing     Problem: Nutrition/Hydration-ADULT  Goal: Nutrient/Hydration intake appropriate for improving, restoring or maintaining nutritional needs  Description  Monitor and assess patient's nutrition/hydration status for malnutrition  Collaborate with interdisciplinary team and initiate plan and interventions as ordered  Monitor patient's weight and dietary intake as ordered or per policy  Utilize nutrition screening tool and intervene as necessary  Determine patient's food preferences and provide high-protein, high-caloric foods as appropriate       INTERVENTIONS:  - Monitor oral intake, urinary output, labs, and treatment plans  - Assess nutrition and hydration status and recommend course of action  - Evaluate amount of meals eaten  - Assist patient with eating if necessary   - Allow adequate time for meals  - Recommend/ encourage appropriate diets, oral nutritional supplements, and vitamin/mineral supplements  - Order, calculate, and assess calorie counts as needed  - Assess need for intravenous fluids  - Provide specific nutrition/hydration education as appropriate  - Include patient/family/caregiver in decisions related to nutrition   Outcome: Progressing     Problem: Potential for Falls  Goal: Patient will remain free of falls  Description  INTERVENTIONS:  - Assess patient frequently for physical needs  -  Identify cognitive and physical deficits and behaviors that affect risk of falls    -  [place] fall precautions as indicated by assessment   - Educate patient/family on patient safety including physical limitations  - Instruct patient to call for assistance with activity based on assessment  - Modify environment to reduce risk of injury  - Consider OT/PT consult to assist with strengthening/mobility  Outcome: Progressing

## 2020-07-23 NOTE — ASSESSMENT & PLAN NOTE
Patient presented to the ED with urinary retention symptoms  She had Dias catheter placed that she was having difficulty urinating  Currently has 500-700 cc of urine present in the bag which is mild pink color    Place on IV fluid hydration also on Flomax and will do a voiding trial in the next 24-48 hours pending progress

## 2020-07-23 NOTE — ED PROVIDER NOTES
History  Chief Complaint   Patient presents with    Blood in Urine     patient seen by pcp for urinary pain and burning  today got worse  patient bladders scanned for 291 ml in her bladder and asked to void  The patient is a 44year old female, who presents to the ED today with a chief complaint of dysuria and hematuria x2 days  The patient states that 2 days ago she began having some dysuria that has gradually increased  Patient also notes to have hematuria passing large clots  Patient states that today she is having suprapubic abdominal pain radiating into her right flank  Patient denies any fevers or chills, nausea or vomiting vaginal bleeding, diarrhea  Patient is having increased urinary frequency and decreased urination output  History provided by:  Patient   used: No    Blood in Urine   This is a new problem  The current episode started yesterday  The problem has been rapidly worsening since onset  She describes the hematuria as gross hematuria  The hematuria occurs throughout her entire urinary stream  Her pain is at a severity of 8/10  The pain is moderate  She describes her urine color as dark red  Irritative symptoms include frequency and urgency  Obstructive symptoms include incomplete emptying and straining  Associated symptoms include abdominal pain, dysuria and flank pain  Pertinent negatives include no chills, facial swelling, fever, nausea or vomiting  She is not sexually active  Her past medical history is significant for kidney stones  There is no history of recent infection or STDs  Prior to Admission Medications   Prescriptions Last Dose Informant Patient Reported? Taking?    albuterol (PROVENTIL HFA,VENTOLIN HFA) 90 mcg/act inhaler 7/22/2020 at Unknown time  Yes Yes   Sig: Inhale 2 puffs 3 (three) times a day as needed   fluticasone (ARNUITY ELLIPTA) 200 MCG/ACT AEPB inhaler 7/22/2020 at Unknown time  Yes Yes   Sig: Inhale 1 puff daily   gabapentin (NEURONTIN) 600 MG tablet 7/22/2020 at Unknown time Self Yes Yes   Sig: Take 600 mg by mouth 2 (two) times a day   levothyroxine 25 mcg tablet 7/22/2020 at Unknown time  Yes Yes   Sig: Take 25 mcg by mouth daily   montelukast (SINGULAIR) 10 mg tablet 7/22/2020 at Unknown time  Yes Yes   Sig: Take 10 mg by mouth daily   pantoprazole (PROTONIX) 20 mg tablet 7/22/2020 at Unknown time Self Yes Yes   Sig: Take 20 mg by mouth 2 (two) times a day   topiramate (TOPAMAX) 50 MG tablet 7/22/2020 at Unknown time  Yes Yes   Sig: Take 50 mg by mouth every 12 (twelve) hours      Facility-Administered Medications: None       Past Medical History:   Diagnosis Date    Arthritis     Asthma     COPD (chronic obstructive pulmonary disease) ([facility] [address] )     Fibromyalgia     Kidney stones        Past Surgical History:   Procedure Laterality Date    JOINT REPLACEMENT      bilateral knee   LIPOMA RESECTION         Family History   Problem Relation Age of Onset    Hypertension Father      I have reviewed and agree with the history as documented  E-Cigarette/Vaping    E-Cigarette Use Never User      E-Cigarette/Vaping Substances     Social History     Tobacco Use    Smoking status: Current Every Day Smoker     Packs/day: 0 50    Smokeless tobacco: Never Used   Substance Use Topics    Alcohol use: Never     Frequency: Never    Drug use: Never       Review of Systems   Constitutional: Negative for chills and fever  HENT: Negative for ear pain, facial swelling and sore throat  Eyes: Negative for pain and visual disturbance  Respiratory: Negative for cough, shortness of breath and wheezing  Cardiovascular: Negative for chest pain, palpitations and leg swelling  Gastrointestinal: Positive for abdominal pain  Negative for nausea and vomiting  Genitourinary: Positive for decreased urine volume, dysuria, flank pain, frequency, hematuria, incomplete emptying and urgency     Musculoskeletal: Negative for arthralgias and back pain    Skin: Negative for color change and rash  Neurological: Negative for dizziness, seizures and syncope  All other systems reviewed and are negative  Physical Exam  Physical Exam   Constitutional: She is oriented to person, place, and time  She appears well-developed and well-nourished  No distress  HENT:   Head: Normocephalic and atraumatic  Right Ear: External ear normal    Left Ear: External ear normal    Mouth/Throat: Oropharynx is clear and moist    Eyes: Pupils are equal, round, and reactive to light  EOM are normal    Neck: Normal range of motion  Neck supple  Cardiovascular: Normal rate, regular rhythm and intact distal pulses  No murmur heard  Pulmonary/Chest: Effort normal and breath sounds normal  No respiratory distress  She has no wheezes  Abdominal: Soft  Bowel sounds are normal  She exhibits no mass  There is tenderness in the suprapubic area  There is CVA tenderness  There is no rebound  No hernia  Neurological: She is alert and oriented to person, place, and time  She has normal strength  No cranial nerve deficit or sensory deficit  Coordination and gait normal  GCS eye subscore is 4  GCS verbal subscore is 5  GCS motor subscore is 6  Skin: Skin is warm and dry  Capillary refill takes less than 2 seconds  No rash noted  Psychiatric: She has a normal mood and affect  Her behavior is normal    Nursing note and vitals reviewed        Vital Signs  ED Triage Vitals   Temperature Pulse Respirations Blood Pressure SpO2   07/23/20 1214 07/23/20 1214 07/23/20 1214 07/23/20 1214 07/23/20 1214   98 4 °F (36 9 °C) 74 20 130/75 94 %      Temp Source Heart Rate Source Patient Position - Orthostatic VS BP Location FiO2 (%)   07/23/20 1214 07/23/20 1214 07/23/20 1214 07/23/20 1214 --   Temporal Monitor Sitting Left arm       Pain Score       07/23/20 1552       4           Vitals:    07/24/20 1459 07/24/20 2341 07/25/20 0810 07/25/20 1516   BP: (!) 82/57 (!) 87/57 119/68 111/67 Pulse: 65 65  66   Patient Position - Orthostatic VS:             Visual Acuity      ED Medications  Medications   levofloxacin (LEVAQUIN) IVPB (premix) 750 mg 150 mL (750 mg Intravenous New Bag 7/25/20 1328)   fluticasone (FLOVENT HFA) 220 mcg/act inhaler 1 puff (1 puff Inhalation Given 7/25/20 0841)   gabapentin (NEURONTIN) capsule 600 mg (600 mg Oral Given 7/25/20 1643)   levothyroxine tablet 25 mcg (25 mcg Oral Given 7/25/20 0518)   montelukast (SINGULAIR) tablet 10 mg (10 mg Oral Given 7/25/20 0839)   pantoprazole (PROTONIX) EC tablet 20 mg (20 mg Oral Given 7/25/20 1328)   topiramate (TOPAMAX) tablet 50 mg (50 mg Oral Given 7/25/20 0839)   albuterol (PROVENTIL HFA,VENTOLIN HFA) inhaler 2 puff (has no administration in time range)   sodium chloride 0 9 % infusion (50 mL/hr Intravenous New Bag 7/25/20 1329)   ondansetron (ZOFRAN) injection 4 mg (has no administration in time range)   polyethylene glycol (MIRALAX) packet 17 g (has no administration in time range)   acetaminophen (TYLENOL) tablet 650 mg (has no administration in time range)   morphine injection 2 mg (2 mg Intravenous Given 7/25/20 1642)   tamsulosin (FLOMAX) capsule 0 4 mg (0 4 mg Oral Given 7/25/20 1643)   oxyCODONE (ROXICODONE) IR tablet 5 mg (5 mg Oral Given 7/25/20 0301)   calcium carbonate (TUMS) chewable tablet 500 mg (500 mg Oral Given 7/24/20 2022)   phenazopyridine (PYRIDIUM) tablet 100 mg (100 mg Oral Given 7/25/20 1643)   sodium chloride 0 9 % bolus 1,000 mL (1,000 mL Intravenous New Bag 7/23/20 1309)   levofloxacin (LEVAQUIN) IVPB (premix) 750 mg 150 mL (750 mg Intravenous New Bag 7/23/20 1309)   phenazopyridine (PYRIDIUM) tablet 100 mg (100 mg Oral Given 7/23/20 1309)   morphine (PF) 10 mg/mL injection 6 mg (6 mg Intravenous Given 7/23/20 1309)   iohexol (OMNIPAQUE) 350 MG/ML injection (MULTI-DOSE) 100 mL (100 mL Intravenous Given 7/23/20 1339)       Diagnostic Studies  Results Reviewed     Procedure Component Value Units Date/Time Urine culture [[id number]]  (Abnormal)  (Susceptibility) Collected:  07/23/20 1237    Lab Status:  Final result Specimen:  Urine, Clean Catch Updated:  07/25/20 0758     Urine Culture >100,000 cfu/ml Escherichia coli    Susceptibility     Escherichia coli (1)     Antibiotic Interpretation Microscan Method Status    ZID Performed  Yes  ABDELRAHMAN Final    Ampicillin ($$) Susceptible <=8 00 ug/ml ABDELRAHMAN Final    Aztreonam ($$$)  Susceptible <=4 ug/ml ABDELRAHMAN Final    Cefazolin ($) Susceptible <=2 00 ug/ml ABDELRAHMAN Final    Ciprofloxacin ($)  Susceptible <=1 00 ug/ml ABDELRAHMAN Final    Gentamicin ($$) Susceptible <=1 ug/ml ABDELRAHMAN Final    Levofloxacin ($) Susceptible <=0 25 ug/ml ABDELRAHMAN Final    Nitrofurantoin Susceptible <=32 ug/ml ABDELRAHMAN Final    Tetracycline Susceptible <=4 ug/ml ABDELRAHMAN Final    Tobramycin ($) Susceptible <=1 ug/ml ABDELRAHMAN Final    Trimethoprim + Sulfamethoxazole ($$$) Susceptible <=2/38 ug/ml ABDELRAHMAN Final                   Blood culture #1 [[id number]] Collected:  07/23/20 1230    Lab Status:  Preliminary result Specimen:  Blood from Arm, Right Updated:  07/24/20 1701     Blood Culture No Growth at 24 hrs  Blood culture #2 [[id number]] Collected:  07/23/20 1237    Lab Status:  Preliminary result Specimen:  Blood from Arm, Right Updated:  07/24/20 1701     Blood Culture No Growth at 24 hrs      CBC and differential [[id number]]  (Abnormal) Collected:  07/23/20 1230    Lab Status:  Final result Specimen:  Blood from Arm, Right Updated:  07/23/20 1323     WBC 18 65 Thousand/uL      RBC 5 38 Million/uL      Hemoglobin 15 7 g/dL      Hematocrit 46 9 %      MCV 87 fL      MCH 29 2 pg      MCHC 33 5 g/dL      RDW 13 1 %      MPV 12 4 fL      Platelets 106 Thousands/uL      nRBC 0 /100 WBCs      Neutrophils Relative 76 %      Immat GRANS % 0 %      Lymphocytes Relative 15 %      Monocytes Relative 6 %      Eosinophils Relative 2 %      Basophils Relative 1 %      Neutrophils Absolute 14 25 Thousands/µL      Immature Grans Absolute 0 08 Thousand/uL Lymphocytes Absolute 2 76 Thousands/µL      Monocytes Absolute 1 02 Thousand/µL      Eosinophils Absolute 0 45 Thousand/µL      Basophils Absolute 0 09 Thousands/µL     Lactic acid [[id number]]  (Normal) Collected:  07/23/20 1230    Lab Status:  Final result Specimen:  Blood from Arm, Right Updated:  07/23/20 1311     LACTIC ACID 1 1 mmol/L     Narrative:       Result may be elevated if tourniquet was used during collection      Comprehensive metabolic panel [[id number]]  (Abnormal) Collected:  07/23/20 1230    Lab Status:  Final result Specimen:  Blood from Arm, Right Updated:  07/23/20 1310     Sodium 139 mmol/L      Potassium 4 1 mmol/L      Chloride 105 mmol/L      CO2 25 mmol/L      ANION GAP 9 mmol/L      BUN 10 mg/dL      Creatinine 1 14 mg/dL      Glucose 102 mg/dL      Calcium 9 0 mg/dL      AST 24 U/L      ALT 35 U/L      Alkaline Phosphatase 95 U/L      Total Protein 6 8 g/dL      Albumin 3 0 g/dL      Total Bilirubin 0 46 mg/dL      eGFR 55 ml/min/1 73sq m     Narrative:       [place] guidelines for Chronic Kidney Disease (CKD):     Stage 1 with normal or high GFR (GFR > 90 mL/min/1 73 square meters)    Stage 2 Mild CKD (GFR = 60-89 mL/min/1 73 square meters)    Stage 3A Moderate CKD (GFR = 45-59 mL/min/1 73 square meters)    Stage 3B Moderate CKD (GFR = 30-44 mL/min/1 73 square meters)    Stage 4 Severe CKD (GFR = 15-29 mL/min/1 73 square meters)    Stage 5 End Stage CKD (GFR <15 mL/min/1 73 square meters)  Note: GFR calculation is accurate only with a steady state creatinine    Urine Microscopic [[id number]]  (Abnormal) Collected:  07/23/20 1237    Lab Status:  Final result Specimen:  Urine, Clean Catch Updated:  07/23/20 1304     RBC, UA Innumerable /hpf      WBC, UA Innumerable /hpf      Epithelial Cells Moderate /hpf      Bacteria, UA Occasional /hpf     UA w Reflex to Microscopic w Reflex to Culture [[id number]]  (Abnormal) Collected:  07/23/20 1237    Lab Status: Final result Specimen:  Urine, Clean Catch Updated:  07/23/20 1248     Color, UA Yellow     Clarity, UA Cloudy     Specific [place], UA 1 020     pH, UA 6 5     Leukocytes, UA Moderate     Nitrite, UA Positive     Protein, UA >=300 mg/dl      Glucose, UA Negative mg/dl      Ketones, UA Negative mg/dl      Urobilinogen, UA 1 0 E U /dl      Bilirubin, UA Small     Blood, UA Large                 US kidney and bladder   Final Result by Tahira Abad MD (07/25 8339)   Nonobstructive renal calculi similar to CT  Workstation performed: [id number]         CT abdomen pelvis with contrast   Final Result by Ramon Barry MD (07/23 1405)         1  Nonobstructing right intrarenal calculi as noted, the largest calculus in the upper pole measuring 12 mm in greatest dimension  2   Urothelial thickening and enhancement of the right renal collecting system and ureter along its length with mild stranding surrounding the collecting system and ureter consistent with a nonspecific ureteropyelitis  There appears to be a concomitant    cystitis as well  3   Postoperative change reflecting hysterectomy  Simple appearing 2 7 cm left adnexal cyst    4   Additional findings as noted  Workstation performed: [id number]                    Procedures  Procedures         ED Course       US AUDIT      Most Recent Value   Initial Alcohol Screen: US AUDIT-C    1  How often do you have a drink containing alcohol?  0 Filed at: 07/23/2020 1216   2  How many drinks containing alcohol do you have on a typical day you are drinking? 0 Filed at: 07/23/2020 1216   3b  FEMALE Any Age, or MALE 65+: How often do you have 4 or more drinks on one occassion? 0 Filed at: 07/23/2020 1216   Audit-C Score  0 Filed at: 07/23/2020 1216                  PRINCE/DAST-10      Most Recent Value   How many times in the past year have you    Used an illegal drug or used a prescription medication for non-medical reasons?   Never Filed at: 07/23/2020 1216            MDM  Number of Diagnoses or Management Options  Dysuria:   Hematuria:   Pyelonephritis:   Urinary retention:   Diagnosis management comments: ddx acute cystitis, renal colic, pyelonephritis, obstruction, abscess    The patient had leukocytosis on lab work and on CT scan was found to have ureteropyelitis   IV Levaquin was given due to allergy to cephalosporins and penicillins  Blood cultures pending  Bladder scan postvoid revealed 343 mL therefore Dias catheter was placed acute retention  Discussion was made with hospitalist Dr Keegan Cam about admission  Patient was in agreement with treatment plan         Amount and/or Complexity of Data Reviewed  Clinical lab tests: ordered and reviewed  Tests in the radiology section of CPT®: ordered and reviewed  Decide to obtain previous medical records or to obtain history from someone other than the patient: yes  Review and summarize past medical records: yes  Discuss the patient with other providers: yes  Independent visualization of images, tracings, or specimens: yes    Risk of Complications, Morbidity, and/or Mortality  Presenting problems: moderate  Diagnostic procedures: moderate  Management options: moderate  General comments: Pain improved    Patient Progress  Patient progress: improved        Disposition  Final diagnoses:   Dysuria   Hematuria   Pyelonephritis   Urinary retention     Time reflects when diagnosis was documented in both MDM as applicable and the Disposition within this note     Time User Action Codes Description Comment    7/23/2020 12:41 PM Timothy Kehr Add [R30 0] Dysuria     7/23/2020 12:42 PM Timothy Kehr Add [R31 9] Hematuria     7/23/2020  2:35 PM Lourena Zander Add [N12] Pyelonephritis     7/23/2020  2:42 PM Lourena Zander Add [R33 9] Urinary retention       ED Disposition     ED Disposition Condition Date/Time Comment    Admit Stable u Jul 23, 2020  2:39 PM Case was discussed with  Dr Keegan Cam and the patient's admission status was agreed to be Admission Status: inpatient status to the service of Dr Chang Reap up With Specialties Details Why Spordi 87, DO Family Medicine Follow up on 7/27/2020 Pt has a 3 month return visit scheduled for Monday, 07/27/20 with [address] [place] [address] Road at 12:45pm    If pt remains IP, CM will contact office to reschedule  [address] [facility]  [phone]      [facility]  [address] [facility], [address]  Phone: [phone]            Current Discharge Medication List      CONTINUE these medications which have NOT CHANGED    Details   albuterol (PROVENTIL HFA,VENTOLIN HFA) 90 mcg/act inhaler Inhale 2 puffs 3 (three) times a day as needed    Comments: Substitution to a formulary equivalent within the same pharmaceutical class is authorized  fluticasone (ARNUITY ELLIPTA) 200 MCG/ACT AEPB inhaler Inhale 1 puff daily      gabapentin (NEURONTIN) 600 MG tablet Take 600 mg by mouth 2 (two) times a day      levothyroxine 25 mcg tablet Take 25 mcg by mouth daily      montelukast (SINGULAIR) 10 mg tablet Take 10 mg by mouth daily      pantoprazole (PROTONIX) 20 mg tablet Take 20 mg by mouth 2 (two) times a day      topiramate (TOPAMAX) 50 MG tablet Take 50 mg by mouth every 12 (twelve) hours           No discharge procedures on file      PDMP Review     None          ED Provider  Electronically Signed by           April Chavis PA-C  07/23/20   Clifton Baptiste MD  07/25/20 1904

## 2020-07-23 NOTE — ASSESSMENT & PLAN NOTE
Patient found to have acute cystitis with hematuria present on admission  Patient is allergic to penicillins and cephalosporins and hence will place on IV Levaquin for now  Blood cultures and urine culture sent and results pending at this time  CT abdomen pelvis reviewed which shows right-sided ureteropyelitis and also 2 kidney stones noted    Will need outpatient follow-up with urology

## 2020-07-23 NOTE — ASSESSMENT & PLAN NOTE
Stable at this time and not in exacerbation  Continue Singulair and albuterol p r n   Along with Flovent

## 2020-07-23 NOTE — H&P
H&P- Iesharohan Alex 1970, 44 y o  female MRN: [id number]    Unit/Bed#: -01 Encounter: [id number]    Primary Care Provider: Mikael Davenport DO   Date and time admitted to hospital: 7/23/2020 12:04 PM        * Acute cystitis with hematuria  Assessment & Plan  Patient found to have acute cystitis with hematuria present on admission  Patient is allergic to penicillins and cephalosporins and hence will place on IV Levaquin for now  Blood cultures and urine culture sent and results pending at this time  CT abdomen pelvis reviewed which shows right-sided ureteropyelitis and also 2 kidney stones noted  Will need outpatient follow-up with urology    Urinary retention  Assessment & Plan  Patient presented to the ED with urinary retention symptoms  She had Dias catheter placed that she was having difficulty urinating  Currently has 500-700 cc of urine present in the bag which is mild pink color  Place on IV fluid hydration also on Flomax and will do a voiding trial in the next 24-48 hours pending progress    Mild intermittent asthma without complication  Assessment & Plan  Stable at this time and not in exacerbation  Continue Singulair and albuterol p r n  Along with Flovent    Chronic pain syndrome  Assessment & Plan  Patient has known history of fibromyalgia and chronic pain syndrome  Continue home regimen of Topamax, Neurontin  Morbid obesity ([address] )  Assessment & Plan  Counseled on diet exercise and lifestyle modification      VTE Prophylaxis: Pharmacologic VTE Prophylaxis contraindicated due to Hematuria  / sequential compression device   Code Status:  Full code  POLST: There is no POLST form on file for this patient (pre-hospital)  Discussion with family:  None    Anticipated Length of Stay:  Patient will be admitted on an Inpatient basis with an anticipated length of stay of  more than 2 midnights     Justification for Hospital Stay:  Difficulty urinating and blood in urine    Total Time for Visit, including Counseling / Coordination of Care: 1 hour  Greater than 50% of this total time spent on direct patient counseling and coordination of care  Chief Complaint:   Difficulty urinating and blood in urine    History of Present Illness:    Pilar Talley is a 44 y o  female who presents with 2 day history of a abdominal pelvic pain  Patient states that she was in her usual state of health however 2 days ago started experiencing some lower abdominal pain and burning on urination  Yesterday evening she started noticing blood every time she would wipe herself and there worse blood and clots noted  Patient states that she does not menstruate anymore and hence was surprised by the blood clots  She also states that she is urinating small amounts and gets a lot of burning and pain every time she urinates and pain on her right lower abdomen  Denies any nausea vomiting or diarrhea  States she has history of kidney stones in the past   Denies any fevers or chills  She is not sexually active  She has history of having urinary tract infections in the past but this feels more severe than her previous episodes    Review of Systems:    Review of Systems   Constitutional: Positive for appetite change and fatigue  Negative for chills and fever  HENT: Negative for hearing loss, sore throat and trouble swallowing  Eyes: Negative for photophobia, discharge and visual disturbance  Respiratory: Negative for chest tightness and shortness of breath  Cardiovascular: Negative for chest pain and palpitations  Gastrointestinal: Negative for abdominal pain, blood in stool and vomiting  Endocrine: Negative for polydipsia and polyuria  Genitourinary: Positive for decreased urine volume, difficulty urinating, dysuria, flank pain, hematuria, pelvic pain and urgency  Musculoskeletal: Negative for back pain and gait problem  Skin: Negative for rash     Allergic/Immunologic: Negative for environmental allergies and food allergies  Neurological: Negative for dizziness, seizures, syncope and headaches  Hematological: Does not bruise/bleed easily  Psychiatric/Behavioral: Negative for behavioral problems  All other systems reviewed and are negative  Past Medical and Surgical History:     Past Medical History:   Diagnosis Date    Arthritis     Asthma     [facility] obstructive [facility]) [facility] [address]     Fibromyalgia     Kidney stones        Past Surgical History:   Procedure Laterality Date    JOINT REPLACEMENT      bilateral knee   LIPOMA RESECTION         Meds/Allergies:    Prior to Admission medications    Medication Sig Start Date End Date Taking? Authorizing Provider   albuterol (PROVENTIL HFA,VENTOLIN HFA) 90 mcg/act inhaler Inhale 2 puffs 3 (three) times a day as needed 1/28/19  Yes Historical Provider, MD   fluticasone (ARNUITY ELLIPTA) 200 MCG/ACT AEPB inhaler Inhale 1 puff daily 1/28/19  Yes Historical Provider, MD   gabapentin (NEURONTIN) 600 MG tablet Take 600 mg by mouth 2 (two) times a day   Yes Kamran Wren MD   levothyroxine 25 mcg tablet Take 25 mcg by mouth daily 1/22/20  Yes Historical Provider, MD   montelukast (SINGULAIR) 10 mg tablet Take 10 mg by mouth daily 5/1/20  Yes Historical Provider, MD   pantoprazole (PROTONIX) 20 mg tablet Take 20 mg by mouth 2 (two) times a day   Yes Historical Provider, MD   topiramate (TOPAMAX) 50 MG tablet Take 50 mg by mouth every 12 (twelve) hours   Yes Historical Provider, MD     I have reviewed home medications with patient personally  Allergies: Allergies   Allergen Reactions    Buspirone Palpitations     Other reaction(s):  Other (Please comment)  Palpitations      Amoxicillin      Other reaction(s): Rash  rash      Cephalexin Hives    Ketorolac Tromethamine     Latex Hives    Other     Varenicline      Other reaction(s): Heart Races  Heart races          Social History:     Marital Status: Single  Social History     Substance and Sexual Activity   Alcohol Use Never    Frequency: Never     Social History     Tobacco Use   Smoking Status Current Every Day Smoker    Packs/day: 0 50   Smokeless Tobacco Never Used     Social History     Substance and Sexual Activity   Drug Use Never       Family History:    Family History   Problem Relation Age of Onset    Hypertension Father        Physical Exam:     Vitals:   Blood Pressure: 122/71 (07/23/20 1530)  Pulse: 78 (07/23/20 1530)  Temperature: 98 4 °F (36 9 °C) (07/23/20 1530)  Temp Source: Oral (07/23/20 1530)  Respirations: 18 (07/23/20 1530)  Weight - Scale: 130 kg (286 lb 9 6 oz) (07/23/20 1611)  SpO2: 98 % (07/23/20 1614)    Physical Exam   Constitutional: She is oriented to person, place, and time  She appears well-developed and well-nourished  HENT:   Head: Normocephalic and atraumatic  Right Ear: External ear normal    Left Ear: External ear normal    Mouth/Throat: Oropharynx is clear and moist    Eyes: Pupils are equal, round, and reactive to light  Conjunctivae and EOM are normal    Neck: Normal range of motion  Neck supple  Cardiovascular: Normal rate, regular rhythm and normal heart sounds  Pulmonary/Chest: Effort normal and breath sounds normal    Abdominal: Soft  Bowel sounds are normal  She exhibits no mass  There is no tenderness  There is no rebound and no guarding  Tenderness on palpation of the pelvic region  Mild right flank tenderness noted   Genitourinary:   Genitourinary Comments: deferred   Musculoskeletal: Normal range of motion  Neurological: She is alert and oriented to person, place, and time  She has normal reflexes  Cranial nerves 2-12 are normal   Normal neurological exam   Skin: Skin is warm and dry  No rash noted  Psychiatric: She has a normal mood and affect  Nursing note and vitals reviewed  Additional Data:     Lab Results: I have personally reviewed pertinent reports        Results from last 7 days   Lab Units 07/23/20  1230   WBC Thousand/uL 18 65*   HEMOGLOBIN g/dL 15 7*   HEMATOCRIT % 46 9*   PLATELETS Thousands/uL 227   NEUTROS PCT % 76*   LYMPHS PCT % 15   MONOS PCT % 6   EOS PCT % 2     Results from last 7 days   Lab Units 07/23/20  1230   SODIUM mmol/L 139   POTASSIUM mmol/L 4 1   CHLORIDE mmol/L 105   CO2 mmol/L 25   BUN mg/dL 10   CREATININE mg/dL 1 14   ANION GAP mmol/L 9   CALCIUM mg/dL 9 0   ALBUMIN g/dL 3 0*   TOTAL BILIRUBIN mg/dL 0 46   ALK PHOS U/L 95   ALT U/L 35   AST U/L 24   GLUCOSE RANDOM mg/dL 102                 Results from last 7 days   Lab Units 07/23/20  1230   LACTIC ACID mmol/L 1 1       Imaging: I have personally reviewed pertinent reports  CT abdomen pelvis with contrast   Final Result by Chino Green MD (07/23 5701)         1  Nonobstructing right intrarenal calculi as noted, the largest calculus in the upper pole measuring 12 mm in greatest dimension  2   Urothelial thickening and enhancement of the right renal collecting system and ureter along its length with mild stranding surrounding the collecting system and ureter consistent with a nonspecific ureteropyelitis  There appears to be a concomitant    cystitis as well  3   Postoperative change reflecting hysterectomy  Simple appearing 2 7 cm left adnexal cyst    4   Additional findings as noted  Workstation performed: [id number]             EKG, Pathology, and Other Studies Reviewed on Admission:   · EKG:  Ordered    Allscripts / Epic Records Reviewed: Yes     ** Please Note: This note has been constructed using a voice recognition system   **

## 2020-07-24 PROBLEM — N20.0 KIDNEY STONES: Status: ACTIVE | Noted: 2020-07-24

## 2020-07-24 LAB
ALBUMIN SERPL BCP-MCNC: 2.3 G/DL (ref 3.5–5)
ALP SERPL-CCNC: 86 U/L (ref 46–116)
ALT SERPL W P-5'-P-CCNC: 29 U/L (ref 12–78)
ANION GAP SERPL CALCULATED.3IONS-SCNC: 7 MMOL/L (ref 4–13)
AST SERPL W P-5'-P-CCNC: 18 U/L (ref 5–45)
BILIRUB SERPL-MCNC: 0.31 MG/DL (ref 0.2–1)
BUN SERPL-MCNC: 9 MG/DL (ref 5–25)
CALCIUM SERPL-MCNC: 8.3 MG/DL (ref 8.3–10.1)
CHLORIDE SERPL-SCNC: 108 MMOL/L (ref 100–108)
CO2 SERPL-SCNC: 26 MMOL/L (ref 21–32)
CREAT SERPL-MCNC: 1.08 MG/DL (ref 0.6–1.3)
ERYTHROCYTE [DISTWIDTH] IN BLOOD BY AUTOMATED COUNT: 13.1 % (ref 11.6–15.1)
GFR SERPL CREATININE-BSD FRML MDRD: 59 ML/MIN/1.73SQ M
GLUCOSE SERPL-MCNC: 93 MG/DL (ref 65–140)
HCT VFR BLD AUTO: 42 % (ref 34.8–46.1)
HGB BLD-MCNC: 13.7 G/DL (ref 11.5–15.4)
MCH RBC QN AUTO: 29.1 PG (ref 26.8–34.3)
MCHC RBC AUTO-ENTMCNC: 32.6 G/DL (ref 31.4–37.4)
MCV RBC AUTO: 89 FL (ref 82–98)
PLATELET # BLD AUTO: 173 THOUSANDS/UL (ref 149–390)
PMV BLD AUTO: 12.2 FL (ref 8.9–12.7)
POTASSIUM SERPL-SCNC: 3.7 MMOL/L (ref 3.5–5.3)
PROT SERPL-MCNC: 5.4 G/DL (ref 6.4–8.2)
RBC # BLD AUTO: 4.71 MILLION/UL (ref 3.81–5.12)
SODIUM SERPL-SCNC: 141 MMOL/L (ref 136–145)
WBC # BLD AUTO: 12.69 THOUSAND/UL (ref 4.31–10.16)

## 2020-07-24 PROCEDURE — 85027 COMPLETE CBC AUTOMATED: CPT | Performed by: FAMILY MEDICINE

## 2020-07-24 PROCEDURE — 99232 SBSQ HOSP IP/OBS MODERATE 35: CPT | Performed by: FAMILY MEDICINE

## 2020-07-24 PROCEDURE — 80053 COMPREHEN METABOLIC PANEL: CPT | Performed by: FAMILY MEDICINE

## 2020-07-24 RX ORDER — OXYCODONE HYDROCHLORIDE 5 MG/1
5 TABLET ORAL EVERY 4 HOURS PRN
Status: DISCONTINUED | OUTPATIENT
Start: 2020-07-24 | End: 2020-07-26 | Stop reason: HOSPADM

## 2020-07-24 RX ORDER — CALCIUM CARBONATE 200(500)MG
500 TABLET,CHEWABLE ORAL DAILY PRN
Status: DISCONTINUED | OUTPATIENT
Start: 2020-07-24 | End: 2020-07-26 | Stop reason: HOSPADM

## 2020-07-24 RX ADMIN — PANTOPRAZOLE SODIUM 20 MG: 20 TABLET, DELAYED RELEASE ORAL at 13:29

## 2020-07-24 RX ADMIN — LEVOTHYROXINE SODIUM 25 MCG: 25 TABLET ORAL at 05:20

## 2020-07-24 RX ADMIN — CALCIUM CARBONATE (ANTACID) CHEW TAB 500 MG 500 MG: 500 CHEW TAB at 20:22

## 2020-07-24 RX ADMIN — PANTOPRAZOLE SODIUM 20 MG: 20 TABLET, DELAYED RELEASE ORAL at 08:41

## 2020-07-24 RX ADMIN — MORPHINE SULFATE 2 MG: 2 INJECTION, SOLUTION INTRAMUSCULAR; INTRAVENOUS at 12:25

## 2020-07-24 RX ADMIN — MORPHINE SULFATE 2 MG: 2 INJECTION, SOLUTION INTRAMUSCULAR; INTRAVENOUS at 23:11

## 2020-07-24 RX ADMIN — TOPIRAMATE 50 MG: 25 TABLET, FILM COATED ORAL at 08:41

## 2020-07-24 RX ADMIN — GABAPENTIN 600 MG: 300 CAPSULE ORAL at 08:41

## 2020-07-24 RX ADMIN — MORPHINE SULFATE 2 MG: 2 INJECTION, SOLUTION INTRAMUSCULAR; INTRAVENOUS at 18:30

## 2020-07-24 RX ADMIN — LEVOFLOXACIN 750 MG: 5 INJECTION, SOLUTION INTRAVENOUS at 12:25

## 2020-07-24 RX ADMIN — MORPHINE SULFATE 2 MG: 2 INJECTION, SOLUTION INTRAMUSCULAR; INTRAVENOUS at 01:32

## 2020-07-24 RX ADMIN — TOPIRAMATE 50 MG: 25 TABLET, FILM COATED ORAL at 20:22

## 2020-07-24 RX ADMIN — FLUTICASONE PROPIONATE 1 PUFF: 220 AEROSOL, METERED RESPIRATORY (INHALATION) at 20:22

## 2020-07-24 RX ADMIN — TAMSULOSIN HYDROCHLORIDE 0.4 MG: 0.4 CAPSULE ORAL at 17:15

## 2020-07-24 RX ADMIN — SODIUM CHLORIDE 125 ML/HR: 0.9 INJECTION, SOLUTION INTRAVENOUS at 19:38

## 2020-07-24 RX ADMIN — GABAPENTIN 600 MG: 300 CAPSULE ORAL at 17:15

## 2020-07-24 RX ADMIN — MORPHINE SULFATE 2 MG: 2 INJECTION, SOLUTION INTRAMUSCULAR; INTRAVENOUS at 05:36

## 2020-07-24 NOTE — ASSESSMENT & PLAN NOTE
Patient presented to the ED with urinary retention symptoms  She had Dias catheter placed as she was having difficulty urinating    Continues to have blood-tinged urine output in the urine bag  Place on IV fluid hydration also on Flomax and will do a voiding trial in the next 24-48 hours pending progress

## 2020-07-24 NOTE — PLAN OF CARE
Problem: PAIN - ADULT  Goal: Verbalizes/displays adequate comfort level or baseline comfort level  Description  Interventions:  - Encourage patient to monitor pain and request assistance  - Assess pain using appropriate pain scale  - Administer analgesics based on type and severity of pain and evaluate response  - Implement non-pharmacological measures as appropriate and evaluate response  - Consider cultural and social influences on pain and pain management  - Notify physician/advanced practitioner if interventions unsuccessful or patient reports new pain  Outcome: Progressing     Problem: INFECTION - ADULT  Goal: Absence or prevention of progression during hospitalization  Description  INTERVENTIONS:  - Assess and monitor for signs and symptoms of infection  - Monitor lab/diagnostic results  - Monitor all insertion sites, i e  indwelling lines, tubes, and drains  - Monitor endotracheal if appropriate and nasal secretions for changes in amount and color  - [place] appropriate cooling/warming therapies per order  - Administer medications as ordered  - Instruct and encourage patient and family to use good hand hygiene technique  - Identify and instruct in appropriate isolation precautions for identified infection/condition  Outcome: Progressing     Problem: SAFETY ADULT  Goal: Patient will remain free of falls  Description  INTERVENTIONS:  - Assess patient frequently for physical needs  -  Identify cognitive and physical deficits and behaviors that affect risk of falls    -  [place] fall precautions as indicated by assessment   - Educate patient/family on patient safety including physical limitations  - Instruct patient to call for assistance with activity based on assessment  - Modify environment to reduce risk of injury  - Consider OT/PT consult to assist with strengthening/mobility  Outcome: Progressing  Goal: Maintain or return to baseline ADL function  Description  INTERVENTIONS:  -  Assess patient's ability to carry out ADLs; assess patient's baseline for ADL function and identify physical deficits which impact ability to perform ADLs (bathing, care of mouth/teeth, toileting, grooming, dressing, etc )  - Assess/evaluate cause of self-care deficits   - Assess range of motion  - Assess patient's mobility; develop plan if impaired  - Assess patient's need for assistive devices and provide as appropriate  - Encourage maximum independence but intervene and supervise when necessary  - Involve family in performance of ADLs  - Assess for home care needs following discharge   - Consider OT consult to assist with ADL evaluation and planning for discharge  - Provide patient education as appropriate  Outcome: Progressing  Goal: Maintain or return mobility status to optimal level  Description  INTERVENTIONS:  - Assess patient's baseline mobility status (ambulation, transfers, stairs, etc )    - Identify cognitive and physical deficits and behaviors that affect mobility  - Identify mobility aids required to assist with transfers and/or ambulation (gait belt, sit-to-stand, lift, walker, cane, etc )  - [place] fall precautions as indicated by assessment  - Record patient progress and toleration of activity level on Mobility SBAR; progress patient to next Phase/Stage  - Instruct patient to call for assistance with activity based on assessment  - Consider rehabilitation consult to assist with strengthening/weightbearing, etc   Outcome: Progressing     Problem: DISCHARGE PLANNING  Goal: Discharge to home or other facility with appropriate resources  Description  INTERVENTIONS:  - Identify barriers to discharge w/patient and caregiver  - Arrange for needed discharge resources and transportation as appropriate  - Identify discharge learning needs (meds, wound care, etc )  - Arrange for interpretive services to assist at discharge as needed  - Refer to Case Management Department for coordinating discharge planning if the patient needs post-hospital services based on physician/advanced practitioner order or complex needs related to functional status, cognitive ability, or social support system  Outcome: Progressing

## 2020-07-24 NOTE — ASSESSMENT & PLAN NOTE
Patient found to have acute cystitis with hematuria present on admission  Patient is allergic to penicillins and cephalosporins and hence will place on IV Levaquin for now  Blood cultures and urine culture sent and results pending at this time  CT abdomen pelvis reviewed which shows right-sided ureteropyelitis and also 3 kidney stones noted  Nonobstructing 12 mm, 6 mm and 4mm stones    Will ask Urology for evaluation    Patient has significant tenderness and pain in the right flank and lower pelvic region which may be secondary to acute cystitis versus renal colic

## 2020-07-24 NOTE — UTILIZATION REVIEW
Initial Clinical Review    Admission: Date/Time/Statement: Admission Orders (From admission, onward)     Ordered        07/23/20 1442  Inpatient Admission  Once                   Orders Placed This Encounter   Procedures    Inpatient Admission     Standing Status:   Standing     Number of Occurrences:   1     Order Specific Question:   Admitting Physician     Answer:   Abdelrahman Carlisle [[id number]]     Order Specific Question:   Level of Care     Answer:   Med Surg [16]     Order Specific Question:   Estimated length of stay     Answer:   More than 2 Midnights     Order Specific Question:   Certification     Answer:   I certify that inpatient services are medically necessary for this patient for a duration of greater than two midnights  See H&P and MD Progress Notes for additional information about the patient's course of treatment  ED Arrival Information     Expected Arrival Acuity Means of Arrival Escorted By Service Admission Type    - 7/23/2020 11:59 Urgent Walk-In Family Member Hospitalist Urgent    Arrival Complaint    bladder pain urinating blood        Chief Complaint   Patient presents with    Blood in Urine     patient seen by pcp for urinary pain and burning  today got worse  patient bladders scanned for 291 ml in her bladder and asked to void  Assessment/Plan:   44 yof ambulatory to er from home c/o dysuria and hematuria, passing large clots x2 days with associated suprapubic abdominal pain radiating into her right flank, increased urinary frequency and decreased urination output  Hx copd, fibromyalgia  Presents with s/s as above, CVA tenderness  Admission work-up showing leukocytosis, +u/a, intrarenal calculi  Admitted to inpatient status for acute cystitis with hematuria  Dias cath placed for retention, started on ivabt & ivf, urine & blood cultures pending      ED Triage Vitals   Temperature Pulse Respirations Blood Pressure SpO2   07/23/20 1214 07/23/20 1214 07/23/20 1214 07/23/20 1214 07/23/20 1214   98 4 °F (36 9 °C) 74 20 130/75 94 %      Temp Source Heart Rate Source Patient Position - Orthostatic VS BP Location FiO2 (%)   07/23/20 1214 07/23/20 1214 07/23/20 1214 07/23/20 1214 --   Temporal Monitor Sitting Left arm       Pain Score       07/23/20 1552       4        Wt Readings from Last 1 Encounters:   07/23/20 117 kg (257 lb 15 oz)     Additional Vital Signs:   07/23/20 23:39:17  97 8 °F (36 6 °C)  69  17  109/63  78  95 %  --  --   07/23/20 2100  --  --  --  --  --  --  None (Room air)  --   07/23/20 1614  --  --  --  --  --  98 %  None (Room air)  --   07/23/20 15:30:07  98 4 °F (36 9 °C)  78  18  122/71  88  95 %  None (Room air)  Sitting   07/23/20 1315  --  76  20  123/78  96  94 %  --  --   07/23/20 1220  --  --  --  --  --  --  None (Room air)  --   07/23/20 1214  98 4 °F (36 9 °C)  74  20  130/75  --  94 %  None (Room air)  Sitting   Pertinent Labs/Diagnostic Test Results:   Results from last 7 days   Lab Units 07/24/20  0503 07/23/20  1230   WBC Thousand/uL 12 69* 18 65*   HEMOGLOBIN g/dL 13 7 15 7*   HEMATOCRIT % 42 0 46 9*   PLATELETS Thousands/uL 173 227   NEUTROS ABS Thousands/µL  --  14 25*     Results from last 7 days   Lab Units 07/24/20  0503 07/23/20  1230   SODIUM mmol/L 141 139   POTASSIUM mmol/L 3 7 4 1   CHLORIDE mmol/L 108 105   CO2 mmol/L 26 25   ANION GAP mmol/L 7 9   BUN mg/dL 9 10   CREATININE mg/dL 1 08 1 14   EGFR ml/min/1 73sq m 59 55   CALCIUM mg/dL 8 3 9 0     Results from last 7 days   Lab Units 07/24/20  0503 07/23/20  1230   AST U/L 18 24   ALT U/L 29 35   ALK PHOS U/L 86 95   TOTAL PROTEIN g/dL 5 4* 6 8   ALBUMIN g/dL 2 3* 3 0*   TOTAL BILIRUBIN mg/dL 0 31 0 46     Results from last 7 days   Lab Units 07/24/20  0503 07/23/20  1230   GLUCOSE RANDOM mg/dL 93 102     Results from last 7 days   Lab Units 07/23/20  1230   LACTIC ACID mmol/L 1 1     Results from last 7 days   Lab Units 07/23/20  1237   CLARITY UA  Cloudy   COLOR UA  Yellow   SPEC GRAV UA  1 020   PH UA 6 5   GLUCOSE UA mg/dl Negative   KETONES UA mg/dl Negative   BLOOD UA  Large*   PROTEIN UA mg/dl >=300*   NITRITE UA  Positive*   BILIRUBIN UA  Small*   UROBILINOGEN UA E U /dl 1 0   LEUKOCYTES UA  Moderate*   WBC UA /hpf Innumerable*   RBC UA /hpf Innumerable*   BACTERIA UA /hpf Occasional   EPITHELIAL CELLS WET PREP /hpf Moderate*     Results from last 7 days   Lab Units 07/23/20  1237 07/23/20  1230   BLOOD CULTURE  Received in Microbiology Lab  Culture in Progress  Received in Microbiology Lab  Culture in Progress  7/23  Ct a/p= 1  Nonobstructing right intrarenal calculi as noted, the largest calculus in the upper pole measuring 12 mm in greatest dimension  2   Urothelial thickening and enhancement of the right renal collecting system and ureter along its length with mild stranding surrounding the collecting system and ureter consistent with a nonspecific ureteropyelitis  There appears to be a concomitant cystitis as well  3   Postoperative change reflecting hysterectomy  Simple appearing 2 7 cm left adnexal cyst     ED Treatment:   Medication Administration from 07/23/2020 1156 to 07/23/2020 1528       Date/Time Order Dose Route Action     07/23/2020 1309 sodium chloride 0 9 % bolus 1,000 mL 1,000 mL Intravenous New Bag     07/23/2020 1309 levofloxacin (LEVAQUIN) IVPB (premix) 750 mg 150 mL 750 mg Intravenous New Bag     07/23/2020 1309 phenazopyridine (PYRIDIUM) tablet 100 mg 100 mg Oral Given     07/23/2020 1309 morphine (PF) 10 mg/mL injection 6 mg 6 mg Intravenous Given     07/23/2020 1339 iohexol (OMNIPAQUE) 350 MG/ML injection (MULTI-DOSE) 100 mL 100 mL Intravenous Given        Past Medical History:   Diagnosis Date    Arthritis     Asthma     COPD (chronic obstructive [facility] disease) ([facility] [address]     Fibromyalgia     Kidney stones      Admitting Diagnosis: Dysuria [R30 0]  Urinary retention [R33 9]  Blood in urine [R31 9]  Pyelonephritis [N12]  Hematuria [R31 9]  Age/Sex: 44 y o  female  Admission Orders:  scd  Dias cath    Scheduled Medications:  fluticasone 1 puff Inhalation Q12H [address]   gabapentin 600 mg Oral BID   levofloxacin 750 mg Intravenous Q24H   levothyroxine 25 mcg Oral Daily   montelukast 10 mg Oral Daily   pantoprazole 20 mg Oral BID (AM & Afternoon)   tamsulosin 0 4 mg Oral Daily With Dinner   topiramate 50 mg Oral Q12H [address]     Continuous IV Infusions:  sodium chloride 125 mL/hr Intravenous Continuous     PRN Meds:  acetaminophen 650 mg Oral Q6H PRN   albuterol 2 puff Inhalation Q6H PRN   morphine injection 2 mg Intravenous Q4H PRN   ondansetron 4 mg Intravenous Q6H PRN   polyethylene glycol 17 g Oral Daily PRN     Network Utilization Review Department  [email or web address]  ATTENTION: Please call with any questions or concerns to [phone] and carefully listen to the prompts so that you are directed to the right person  All voicemails are confidential   Sudie Crigler all requests for admission clinical reviews, approved or denied determinations and any other requests to dedicated fax number below belonging to the campus where the patient is receiving treatment   List of dedicated fax numbers for the Facilities:  [address] DENIALS (Administrative/Medical Necessity) [phone]   [address]  (Maternity/NICU/Pediatrics) [phone]   Jean-Paul Min [phone]   Zarina Sheets [phone]   Shaye Juniper [phone]   [address]  [phone]   [address] [facility] [address]  [phone]   [facility]  [phone]   [address] [facility] [address]  [address] [facility] [address] [facility] [phone]

## 2020-07-24 NOTE — UTILIZATION REVIEW
Notification of Inpatient Admission/Inpatient Authorization Request   This is a Notification of Inpatient Admission for [address]  Be advised that this patient was admitted to our facility under Inpatient Status  Contact Rosalie Munoz at [phone] for additional admission information  [facility] DEPT  DEDICATED [phone]  Patient Name:   Veronica Mott   YOB: 1970       [address]   [address]:   [address]  Tax ID: [id number]  NPI: [id number] Attending Provider/NPI:  Phone:  Address: Minor Johnson Md [[id number]]  [address] [place] [address] Code: 24 Place of Service Name:  [address] [place] [address]   Start Date: 7/23/20 1442     Discharge Date & Time: No discharge date for patient encounter  Type of Admission: Inpatient Status Discharge Disposition (if discharged): Final discharge disposition not confirmed   Patient Diagnoses: Dysuria [R30 0]  Urinary retention [R33 9]  Blood in urine [R31 9]  Pyelonephritis [N12]  Hematuria [R31 9]     Orders: Admission Orders (From admission, onward)     Ordered        07/23/20 1442  Inpatient Admission  Once                    Assigned Utilization Review Contact: Rosalie Munoz  Utilization   Network Utilization Review Department  Phone: [phone]; Fax [phone]  Email: [email or web address]   ATTENTION PAYERS: Please call the assigned Utilization  directly with any questions or concerns ALL voicemails in the department are confidential  Send all requests for admission clinical reviews, approved or denied determinations and any other requests to dedicated fax number belonging to the campus where the patient is receiving treatment

## 2020-07-24 NOTE — ASSESSMENT & PLAN NOTE
Patient has known history of fibromyalgia and chronic pain syndrome  Continue home regimen of Topamax, Neurontin

## 2020-07-24 NOTE — ASSESSMENT & PLAN NOTE
Patient appears to be having renal colic  Multiple kidney stones noted on the right side  History of kidney stones in the past requiring removal   Continue IV fluid hydration, pain control and Flomax    Will contact Urology for evaluation

## 2020-07-24 NOTE — PROGRESS NOTES
Progress Note - Annmarie Robertson 1970, 44 y o  female MRN: [id number]    Unit/Bed#: -01 Encounter: [id number]    Primary Care Provider: Lino Simmons DO   Date and time admitted to hospital: 7/23/2020 12:04 PM        * Acute cystitis with hematuria  Assessment & Plan  Patient found to have acute cystitis with hematuria present on admission  Patient is allergic to penicillins and cephalosporins and hence will place on IV Levaquin for now  Blood cultures and urine culture sent and results pending at this time  CT abdomen pelvis reviewed which shows right-sided ureteropyelitis and also 3 kidney stones noted  Nonobstructing 12 mm, 6 mm and 4mm stones    Will ask Urology for evaluation  Patient has significant tenderness and pain in the right flank and lower pelvic region which may be secondary to acute cystitis versus renal colic    Kidney stones  Assessment & Plan  Patient appears to be having renal colic  Multiple kidney stones noted on the right side  History of kidney stones in the past requiring removal   Continue IV fluid hydration, pain control and Flomax  Will contact Urology for evaluation    Urinary retention  Assessment & Plan  Patient presented to the ED with urinary retention symptoms  She had Dias catheter placed as she was having difficulty urinating  Continues to have blood-tinged urine output in the urine bag  Place on IV fluid hydration also on Flomax and will do a voiding trial in the next 24-48 hours pending progress    Mild intermittent asthma without complication  Assessment & Plan  Stable at this time and not in exacerbation  Continue Singulair and albuterol p r n  Along with Flovent    Chronic pain syndrome  Assessment & Plan  Patient has known history of fibromyalgia and chronic pain syndrome  Continue home regimen of Topamax, Neurontin      Morbid obesity ([facility] [address] )  Assessment & Plan  Counseled on diet exercise and lifestyle modification      VTE Pharmacologic Prophylaxis: Pharmacologic: Pharmacologic VTE Prophylaxis contraindicated due to Hematuria  Mechanical VTE Prophylaxis in Place: Yes    Patient Centered Rounds: I have performed bedside rounds with nursing staff today  Discussions with Specialists or Other Care Team Provider:  Will discuss over the phone with Urology    Education and Discussions with Family / Patient:  Discussed with patient at bedside    Time Spent for Care: 30 minutes  More than 50% of total time spent on counseling and coordination of care as described above  Current Length of Stay: 1 day(s)    Current Patient Status: Inpatient   Certification Statement: The patient will continue to require additional inpatient hospital stay due to Acute cystitis with hematuria    Discharge Plan:  Pending progress    Code Status: Level 1 - Full Code      Subjective:   Patient is complaining of right-sided flank and lower pelvic pain and burning  Still having some blood in her Dias catheter mixed with urine  Denies any fevers or chills or nausea vomiting  Objective:     Vitals:   Temp (24hrs), Av 3 °F (36 8 °C), Min:97 8 °F (36 6 °C), Max:98 7 °F (37 1 °C)    Temp:  [97 8 °F (36 6 °C)-98 7 °F (37 1 °C)] 98 7 °F (37 1 °C)  HR:  [69-78] 70  Resp:  [17-20] 19  BP: (105-123)/(62-78) 105/62  SpO2:  [94 %-100 %] 100 %  Body mass index is 47 18 kg/m²  Input and Output Summary (last 24 hours): Intake/Output Summary (Last 24 hours) at 2020 1235  Last data filed at 2020 0801  Gross per 24 hour   Intake 1800 ml   Output 2700 ml   Net -900 ml       Physical Exam:     Physical Exam   Constitutional: She is oriented to person, place, and time  She appears well-developed and well-nourished  She appears distressed  HENT:   Head: Normocephalic and atraumatic  Right Ear: External ear normal    Left Ear: External ear normal    Mouth/Throat: Oropharynx is clear and moist    Eyes: Conjunctivae and EOM are normal    Neck: Normal range of motion   Neck supple  Cardiovascular: Normal rate, regular rhythm and normal heart sounds  Pulmonary/Chest: Effort normal and breath sounds normal    Abdominal: Soft  Bowel sounds are normal  She exhibits no mass  There is no tenderness  There is no rebound and no guarding  Tenderness on palpation of the right lower flank and right-sided groin region   Genitourinary:   Genitourinary Comments: Dias catheter present   Musculoskeletal: Normal range of motion  Neurological: She is alert and oriented to person, place, and time  She has normal reflexes  Cranial nerves 2-12 are normal   Normal neurological exam   Skin: Skin is warm and dry  No rash noted  Psychiatric: She has a normal mood and affect  Nursing note and vitals reviewed  Additional Data:     Labs:    Results from last 7 days   Lab Units 07/24/20  0503 07/23/20  1230   WBC Thousand/uL 12 69* 18 65*   HEMOGLOBIN g/dL 13 7 15 7*   HEMATOCRIT % 42 0 46 9*   PLATELETS Thousands/uL 173 227   NEUTROS PCT %  --  76*   LYMPHS PCT %  --  15   MONOS PCT %  --  6   EOS PCT %  --  2     Results from last 7 days   Lab Units 07/24/20  0503   SODIUM mmol/L 141   POTASSIUM mmol/L 3 7   CHLORIDE mmol/L 108   CO2 mmol/L 26   BUN mg/dL 9   CREATININE mg/dL 1 08   ANION GAP mmol/L 7   CALCIUM mg/dL 8 3   ALBUMIN g/dL 2 3*   TOTAL BILIRUBIN mg/dL 0 31   ALK PHOS U/L 86   ALT U/L 29   AST U/L 18   GLUCOSE RANDOM mg/dL 93                 Results from last 7 days   Lab Units 07/23/20  1230   LACTIC ACID mmol/L 1 1           * I Have Reviewed All Lab Data Listed Above  * Additional Pertinent Lab Tests [facility]ed: [facility]    Imaging:    Imaging Reports Reviewed Today Include:  CT abdomen pelvis  Imaging Personally Reviewed by Myself Includes:  None    Recent Cultures (last 7 days):     Results from last 7 days   Lab Units 07/23/20  1237 07/23/20  1230   BLOOD CULTURE  Received in Microbiology Lab  Culture in Progress   Received in Microbiology Lab  Culture in Progress  URINE CULTURE  >100,000 cfu/ml Gram Negative Dajuan Enteric Like*  --        Last 24 Hours Medication List:     Current Facility-Administered Medications:  acetaminophen 650 mg Oral Q6H PRN Keturah Harry MD    albuterol 2 puff Inhalation Q6H PRN Keturah Harry MD    fluticasone 1 puff Inhalation Q12H [facility] Keturah Harry MD    gabapentin 600 mg Oral BID Keturah Harry MD    levofloxacin 750 mg Intravenous Q24H Keturah Harry MD Last Rate: 750 mg (07/24/20 1225)   levothyroxine 25 mcg Oral Daily Keturah Harry MD    montelukast 10 mg Oral Daily Keturah Harry MD    morphine injection 2 mg Intravenous Q4H PRN Keturah Harry MD    ondansetron 4 mg Intravenous Q6H PRN Keturah Harry MD    pantoprazole 20 mg Oral BID (AM & Afternoon) Keturah Harry MD    polyethylene glycol 17 g Oral Daily PRN Keturah Harry MD    sodium chloride 125 mL/hr Intravenous Continuous Keturah Harry MD Last Rate: 125 mL/hr (07/23/20 1714)   tamsulosin 0 4 mg Oral Daily With Harriet Holloway MD    topiramate 50 mg Oral Q12H [facility] Keturah Harry MD         Today, Patient Was Seen By: Keturah Harry MD    ** Please Note: Dictation voice to text software may have been used in the creation of this document   **

## 2020-07-25 ENCOUNTER — APPOINTMENT (INPATIENT)
Dept: ULTRASOUND IMAGING | Facility: HOSPITAL | Age: 50
DRG: 690 | End: 2020-07-25
Payer: COMMERCIAL

## 2020-07-25 LAB — BACTERIA UR CULT: ABNORMAL

## 2020-07-25 PROCEDURE — 76770 US EXAM ABDO BACK WALL COMP: CPT

## 2020-07-25 PROCEDURE — 99232 SBSQ HOSP IP/OBS MODERATE 35: CPT | Performed by: FAMILY MEDICINE

## 2020-07-25 RX ORDER — PHENAZOPYRIDINE HYDROCHLORIDE 100 MG/1
100 TABLET, FILM COATED ORAL
Status: DISCONTINUED | OUTPATIENT
Start: 2020-07-25 | End: 2020-07-26 | Stop reason: HOSPADM

## 2020-07-25 RX ADMIN — PANTOPRAZOLE SODIUM 20 MG: 20 TABLET, DELAYED RELEASE ORAL at 08:39

## 2020-07-25 RX ADMIN — MORPHINE SULFATE 2 MG: 2 INJECTION, SOLUTION INTRAMUSCULAR; INTRAVENOUS at 11:09

## 2020-07-25 RX ADMIN — SODIUM CHLORIDE 50 ML/HR: 0.9 INJECTION, SOLUTION INTRAVENOUS at 13:29

## 2020-07-25 RX ADMIN — SODIUM CHLORIDE 125 ML/HR: 0.9 INJECTION, SOLUTION INTRAVENOUS at 03:43

## 2020-07-25 RX ADMIN — FLUTICASONE PROPIONATE 1 PUFF: 220 AEROSOL, METERED RESPIRATORY (INHALATION) at 20:57

## 2020-07-25 RX ADMIN — MORPHINE SULFATE 2 MG: 2 INJECTION, SOLUTION INTRAMUSCULAR; INTRAVENOUS at 05:18

## 2020-07-25 RX ADMIN — MORPHINE SULFATE 2 MG: 2 INJECTION, SOLUTION INTRAMUSCULAR; INTRAVENOUS at 16:42

## 2020-07-25 RX ADMIN — OXYCODONE HYDROCHLORIDE 5 MG: 5 TABLET ORAL at 23:43

## 2020-07-25 RX ADMIN — PANTOPRAZOLE SODIUM 20 MG: 20 TABLET, DELAYED RELEASE ORAL at 13:28

## 2020-07-25 RX ADMIN — TAMSULOSIN HYDROCHLORIDE 0.4 MG: 0.4 CAPSULE ORAL at 16:43

## 2020-07-25 RX ADMIN — PHENAZOPYRIDINE 100 MG: 100 TABLET ORAL at 10:35

## 2020-07-25 RX ADMIN — MONTELUKAST 10 MG: 10 TABLET, FILM COATED ORAL at 08:39

## 2020-07-25 RX ADMIN — LEVOTHYROXINE SODIUM 25 MCG: 25 TABLET ORAL at 05:18

## 2020-07-25 RX ADMIN — TOPIRAMATE 50 MG: 25 TABLET, FILM COATED ORAL at 20:57

## 2020-07-25 RX ADMIN — GABAPENTIN 600 MG: 300 CAPSULE ORAL at 16:43

## 2020-07-25 RX ADMIN — GABAPENTIN 600 MG: 300 CAPSULE ORAL at 08:39

## 2020-07-25 RX ADMIN — TOPIRAMATE 50 MG: 25 TABLET, FILM COATED ORAL at 08:39

## 2020-07-25 RX ADMIN — LEVOFLOXACIN 750 MG: 5 INJECTION, SOLUTION INTRAVENOUS at 13:28

## 2020-07-25 RX ADMIN — PHENAZOPYRIDINE 100 MG: 100 TABLET ORAL at 13:28

## 2020-07-25 RX ADMIN — PHENAZOPYRIDINE 100 MG: 100 TABLET ORAL at 16:43

## 2020-07-25 RX ADMIN — OXYCODONE HYDROCHLORIDE 5 MG: 5 TABLET ORAL at 03:01

## 2020-07-25 RX ADMIN — FLUTICASONE PROPIONATE 1 PUFF: 220 AEROSOL, METERED RESPIRATORY (INHALATION) at 08:41

## 2020-07-25 NOTE — ASSESSMENT & PLAN NOTE
Patient appears to be having renal colic  Multiple kidney stones noted on the right side  History of kidney stones in the past requiring removal   Continue IV fluid hydration, pain control and Flomax    Needs outpatient follow-up with urology for stone removal

## 2020-07-25 NOTE — ASSESSMENT & PLAN NOTE
Patient presented to the ED with urinary retention symptoms  She had Dias catheter placed as she was having difficulty urinating  Hematuria has now resolved  Will do a voiding trial today and discontinue Dias catheter    Continue Flomax and decrease fluids to 50 cc an hour

## 2020-07-25 NOTE — PLAN OF CARE
Problem: PAIN - ADULT  Goal: Verbalizes/displays adequate comfort level or baseline comfort level  Description  Interventions:  - Encourage patient to monitor pain and request assistance  - Assess pain using appropriate pain scale  - Administer analgesics based on type and severity of pain and evaluate response  - Implement non-pharmacological measures as appropriate and evaluate response  - Consider cultural and social influences on pain and pain management  - Notify physician/advanced practitioner if interventions unsuccessful or patient reports new pain  Outcome: Progressing     Problem: INFECTION - ADULT  Goal: Absence or prevention of progression during hospitalization  Description  INTERVENTIONS:  - Assess and monitor for signs and symptoms of infection  - Monitor lab/diagnostic results  - Monitor all insertion sites, i e  indwelling lines, tubes, and drains  - Monitor endotracheal if appropriate and nasal secretions for changes in amount and color  - [place] appropriate cooling/warming therapies per order  - Administer medications as ordered  - Instruct and encourage patient and family to use good hand hygiene technique  - Identify and instruct in appropriate isolation precautions for identified infection/condition  Outcome: Progressing  Goal: Absence of fever/infection during neutropenic period  Description  INTERVENTIONS:  - Monitor WBC    Outcome: Progressing     Problem: SAFETY ADULT  Goal: Patient will remain free of falls  Description  INTERVENTIONS:  - Assess patient frequently for physical needs  -  Identify cognitive and physical deficits and behaviors that affect risk of falls    -  [place] fall precautions as indicated by assessment   - Educate patient/family on patient safety including physical limitations  - Instruct patient to call for assistance with activity based on assessment  - Modify environment to reduce risk of injury  - Consider OT/PT consult to assist with strengthening/mobility  Outcome: Progressing  Goal: Maintain or return to baseline ADL function  Description  INTERVENTIONS:  -  Assess patient's ability to carry out ADLs; assess patient's baseline for ADL function and identify physical deficits which impact ability to perform ADLs (bathing, care of mouth/teeth, toileting, grooming, dressing, etc )  - Assess/evaluate cause of self-care deficits   - Assess range of motion  - Assess patient's mobility; develop plan if impaired  - Assess patient's need for assistive devices and provide as appropriate  - Encourage maximum independence but intervene and supervise when necessary  - Involve family in performance of ADLs  - Assess for home care needs following discharge   - Consider OT consult to assist with ADL evaluation and planning for discharge  - Provide patient education as appropriate  Outcome: Progressing  Goal: Maintain or return mobility status to optimal level  Description  INTERVENTIONS:  - Assess patient's baseline mobility status (ambulation, transfers, stairs, etc )    - Identify cognitive and physical deficits and behaviors that affect mobility  - Identify mobility aids required to assist with transfers and/or ambulation (gait belt, sit-to-stand, lift, walker, cane, etc )  - [place] fall precautions as indicated by assessment  - Record patient progress and toleration of activity level on Mobility SBAR; progress patient to next Phase/Stage  - Instruct patient to call for assistance with activity based on assessment  - Consider rehabilitation consult to assist with strengthening/weightbearing, etc   Outcome: Progressing     Problem: DISCHARGE PLANNING  Goal: Discharge to home or other facility with appropriate resources  Description  INTERVENTIONS:  - Identify barriers to discharge w/patient and caregiver  - Arrange for needed discharge resources and transportation as appropriate  - Identify discharge learning needs (meds, wound care, etc )  - Arrange for interpretive services to assist at discharge as needed  - Refer to Case Management Department for coordinating discharge planning if the patient needs post-hospital services based on physician/advanced practitioner order or complex needs related to functional status, cognitive ability, or social support system  Outcome: Progressing     Problem: Knowledge Deficit  Goal: Patient/family/caregiver demonstrates understanding of disease process, treatment plan, medications, and discharge instructions  Description  Complete learning assessment and assess knowledge base  Interventions:  - Provide teaching at level of understanding  - Provide teaching via preferred learning methods  Outcome: Progressing     Problem: Nutrition/Hydration-ADULT  Goal: Nutrient/Hydration intake appropriate for improving, restoring or maintaining nutritional needs  Description  Monitor and assess patient's nutrition/hydration status for malnutrition  Collaborate with interdisciplinary team and initiate plan and interventions as ordered  Monitor patient's weight and dietary intake as ordered or per policy  Utilize nutrition screening tool and intervene as necessary  Determine patient's food preferences and provide high-protein, high-caloric foods as appropriate       INTERVENTIONS:  - Monitor oral intake, urinary output, labs, and treatment plans  - Assess nutrition and hydration status and recommend course of action  - Evaluate amount of meals eaten  - Assist patient with eating if necessary   - Allow adequate time for meals  - Recommend/ encourage appropriate diets, oral nutritional supplements, and vitamin/mineral supplements  - Order, calculate, and assess calorie counts as needed  - Assess need for intravenous fluids  - Provide specific nutrition/hydration education as appropriate  - Include patient/family/caregiver in decisions related to nutrition   Outcome: Progressing     Problem: Potential for Falls  Goal: Patient will remain free of falls  Description  INTERVENTIONS:  - Assess patient frequently for physical needs  -  Identify cognitive and physical deficits and behaviors that affect risk of falls    -  [place] fall precautions as indicated by assessment   - Educate patient/family on patient safety including physical limitations  - Instruct patient to call for assistance with activity based on assessment  - Modify environment to reduce risk of injury  - Consider OT/PT consult to assist with strengthening/mobility  Outcome: Progressing

## 2020-07-25 NOTE — SOCIAL WORK
Current LOS 2 days  Pt admitted d/t Acute Cystitis with Hematuria  No CM consult  CM met with patient at the bedside,baseline information was obtained  CM discussed the role of CM in helping the patient develop a discharge plan and assist the patient in carry out their plan  Pt lives with her Boyfriend JONAS in a first floor apartment with [address]  Pt boyfriend is her paid caregiver and provides assistance for all ADLs and steadying assist for ambulation  Pt uses a cane  Neither pt nor boyfriend drive  They rely on pt sister for transportation  Pt has hx of STR at [facility] and at [facility]  Pt has hx of HHC but did not recall the name of the agecny  Pt denies hx of MH or D&A tx  PCP: Dr Ami Flores [facility])   Pt has appointment with Lola Ramachandran on Monday  Preferred Pharmacy: [facility] in [address]  Contact: Silvio Holley "JONAS" Curt Dorsey (boyfriend) [phone]  No formal POA/LW  Pt states TJ would speak on her behalf  Pt sister will transport home at time of d/c  Pt needs PCP f/u and Urology f/u at time of d/c  Pt wishes to continue to see Lola Ramachandran  Pt requesting a urologist closest to her home

## 2020-07-25 NOTE — ASSESSMENT & PLAN NOTE
Patient found to have acute cystitis with hematuria present on admission  Patient is allergic to penicillins and cephalosporins and hence will place on IV Levaquin for now  Blood cultures negative and urine culture positive for E coli  CT abdomen pelvis reviewed which shows right-sided ureteropyelitis and also 3 kidney stones noted  Nonobstructing 12 mm, 6 mm and 4mm stones  Patient noted to have worsening pelvic pain and pressure  Will discontinue Dias catheter and do a voiding trial and observe for now  Will redo a renal ultrasound tomorrow  Discussed with urology over the phone yesterday  Does not appear to be renal colic however does have pretty severe ureteropyelitis    Also give a trial of Pyridium

## 2020-07-25 NOTE — PROGRESS NOTES
Progress Note - Agusto Acosta 1970, 44 y o  female MRN: [id number]    Unit/Bed#: -01 Encounter: [id number]    Primary Care Provider: Milagro Alvarez DO   Date and time admitted to hospital: 7/23/2020 12:04 PM        * Acute cystitis with hematuria  Assessment & Plan  Patient found to have acute cystitis with hematuria present on admission  Patient is allergic to penicillins and cephalosporins and hence will place on IV Levaquin for now  Blood cultures negative and urine culture positive for E coli  CT abdomen pelvis reviewed which shows right-sided ureteropyelitis and also 3 kidney stones noted  Nonobstructing 12 mm, 6 mm and 4mm stones  Patient noted to have worsening pelvic pain and pressure  Will discontinue Dias catheter and do a voiding trial and observe for now  Will redo a renal ultrasound tomorrow  Discussed with urology over the phone yesterday  Does not appear to be renal colic however does have pretty severe ureteropyelitis  Also give a trial of Pyridium    Kidney stones  Assessment & Plan  Patient appears to be having renal colic  Multiple kidney stones noted on the right side  History of kidney stones in the past requiring removal   Continue IV fluid hydration, pain control and Flomax  Needs outpatient follow-up with urology for stone removal    Urinary retention  Assessment & Plan  Patient presented to the ED with urinary retention symptoms  She had Dias catheter placed as she was having difficulty urinating  Hematuria has now resolved  Will do a voiding trial today and discontinue Dias catheter  Continue Flomax and decrease fluids to 50 cc an hour    Mild intermittent asthma without complication  Assessment & Plan  Stable at this time and not in exacerbation  Continue Singulair and albuterol p r n  Along with Flovent    Chronic pain syndrome  Assessment & Plan  Patient has known history of fibromyalgia and chronic pain syndrome    Continue home regimen of Topamax, Neurontin  Morbid obesity ([facility] [address] )  Assessment & Plan  Counseled on diet exercise and lifestyle modification      VTE Pharmacologic Prophylaxis:   Pharmacologic: Pharmacologic VTE Prophylaxis contraindicated due to Hematuria  Mechanical VTE Prophylaxis in Place: Yes    Patient Centered Rounds: I have performed bedside rounds with nursing staff today  Discussions with Specialists or Other Care Team Provider:  None    Education and Discussions with Family / Patient:  Discussed with patient at bedside    Time Spent for Care: 30 minutes  More than 50% of total time spent on counseling and coordination of care as described above  Current Length of Stay: 2 day(s)    Current Patient Status: Inpatient   Certification Statement: The patient will continue to require additional inpatient hospital stay due to Acute cystitis    Discharge Plan:  Pending progress    Code Status: Level 1 - Full Code      Subjective:   Patient states she is having severe of pelvic pressure pain and burning  It is 7 x 10  Denies any fevers or chills  Hematuria has resolved  Objective:     Vitals:   Temp (24hrs), Av 9 °F (36 6 °C), Min:97 6 °F (36 4 °C), Max:98 2 °F (36 8 °C)    Temp:  [97 6 °F (36 4 °C)-98 2 °F (36 8 °C)] 97 6 °F (36 4 °C)  HR:  [65] 65  Resp:  [17-20] 17  BP: ()/(57-68) 119/68  SpO2:  [97 %-99 %] 99 %  Body mass index is 47 18 kg/m²  Input and Output Summary (last 24 hours): Intake/Output Summary (Last 24 hours) at 2020 0911  Last data filed at 2020 0844  Gross per 24 hour   Intake 4550 42 ml   Output 3050 ml   Net 1500 42 ml       Physical Exam:     Physical Exam   Constitutional: She is oriented to person, place, and time  She appears well-developed and well-nourished  HENT:   Head: Normocephalic and atraumatic     Right Ear: External ear normal    Left Ear: External ear normal    Mouth/Throat: Oropharynx is clear and moist    Eyes: Conjunctivae and EOM are normal    Neck: Normal range of motion  Neck supple  Cardiovascular: Normal rate, regular rhythm, normal heart sounds and intact distal pulses  Pulmonary/Chest: Effort normal and breath sounds normal    Abdominal: Soft  Bowel sounds are normal  She exhibits no mass  There is no tenderness  There is no rebound and no guarding  Tenderness on palpation of the right lower flank and pelvic region   Genitourinary:   Genitourinary Comments: deferred   Musculoskeletal: Normal range of motion  Neurological: She is alert and oriented to person, place, and time  She has normal reflexes  Cranial nerves 2-12 are normal   Normal neurological exam   Skin: Skin is warm and dry  No rash noted  Psychiatric: She has a normal mood and affect  Nursing note and vitals reviewed  Additional Data:     Labs:    Results from last 7 days   Lab Units 07/24/20  0503 07/23/20  1230   WBC Thousand/uL 12 69* 18 65*   HEMOGLOBIN g/dL 13 7 15 7*   HEMATOCRIT % 42 0 46 9*   PLATELETS Thousands/uL 173 227   NEUTROS PCT %  --  76*   LYMPHS PCT %  --  15   MONOS PCT %  --  6   EOS PCT %  --  2     Results from last 7 days   Lab Units 07/24/20  0503   SODIUM mmol/L 141   POTASSIUM mmol/L 3 7   CHLORIDE mmol/L 108   CO2 mmol/L 26   BUN mg/dL 9   CREATININE mg/dL 1 08   ANION GAP mmol/L 7   CALCIUM mg/dL 8 3   ALBUMIN g/dL 2 3*   TOTAL BILIRUBIN mg/dL 0 31   ALK PHOS U/L 86   ALT U/L 29   AST U/L 18   GLUCOSE RANDOM mg/dL 93                 Results from last 7 days   Lab Units 07/23/20  1230   LACTIC ACID mmol/L 1 1           * I Have Reviewed All Lab Data Listed Above  * [facility] Pert[facility]    Imaging:    Imaging Reports Reviewed Today Include:  CT abdomen pelvis  Imaging Personally Reviewed by Myself Includes:  None    Recent Cultures (last 7 days):     Results from last 7 days   Lab Units 07/23/20  1237 07/23/20  1230   BLOOD CULTURE  No Growth at 24 hrs  No Growth at 24 hrs     URINE CULTURE >100,000 cfu/ml Escherichia coli*  --        Last 24 Hours Medication List:     Current Facility-Administered Medications:  acetaminophen 650 mg Oral Q6H PRN Juan M Cohen MD    albuterol 2 puff Inhalation Q6H PRN Juan M Cohen MD    calcium carbonate 500 mg Oral Daily PRN Smita Bassett MD    fluticasone 1 puff Inhalation Q12H [facility] Juan M Cohen MD    gabapentin 600 mg Oral BID Juan M Cohen MD    levofloxacin 750 mg Intravenous Q24H Juan M Cohen MD Last Rate: 750 mg (07/24/20 1225)   levothyroxine 25 mcg Oral Daily Juan M Cohen MD    montelukast 10 mg Oral Daily Juan M Cohen MD    morphine injection 2 mg Intravenous Q4H PRN Juan M Cohen MD    ondansetron 4 mg Intravenous Q6H PRN Juan M Cohen MD    oxyCODONE 5 mg Oral Q4H PRN Juan M Cohen MD    pantoprazole 20 mg Oral BID (AM & Afternoon) Juan M Cohen MD    phenazopyridine 100 mg Oral TID With Meals Juan M Cohen MD    polyethylene glycol 17 g Oral Daily PRN Juan M Cohen MD    sodium chloride 50 mL/hr Intravenous Continuous Juan M Cohen MD Last Rate: 125 mL/hr (07/25/20 0343)   tamsulosin 0 4 mg Oral Daily With Silver Holloway MD    topiramate 50 mg Oral Q12H [facility] Juan M Cohen MD         Today, Patient Was Seen By: Juan M Cohen MD    ** Please Note: Dictation voice to text software may have been used in the creation of this document   **

## 2020-07-25 NOTE — PLAN OF CARE
Problem: PAIN - ADULT  Goal: Verbalizes/displays adequate comfort level or baseline comfort level  Description  Interventions:  - Encourage patient to monitor pain and request assistance  - Assess pain using appropriate pain scale  - Administer analgesics based on type and severity of pain and evaluate response  - Implement non-pharmacological measures as appropriate and evaluate response  - Consider cultural and social influences on pain and pain management  - Notify physician/advanced practitioner if interventions unsuccessful or patient reports new pain  Outcome: Progressing     Problem: INFECTION - ADULT  Goal: Absence or prevention of progression during hospitalization  Description  INTERVENTIONS:  - Assess and monitor for signs and symptoms of infection  - Monitor lab/diagnostic results  - Monitor all insertion sites, i e  indwelling lines, tubes, and drains  - Monitor endotracheal if appropriate and nasal secretions for changes in amount and color  - [place] appropriate cooling/warming therapies per order  - Administer medications as ordered  - Instruct and encourage patient and family to use good hand hygiene technique  - Identify and instruct in appropriate isolation precautions for identified infection/condition  Outcome: Progressing  Goal: Absence of fever/infection during neutropenic period  Description  INTERVENTIONS:  - Monitor WBC    Outcome: Progressing     Problem: SAFETY ADULT  Goal: Patient will remain free of falls  Description  INTERVENTIONS:  - Assess patient frequently for physical needs  -  Identify cognitive and physical deficits and behaviors that affect risk of falls    -  [place] fall precautions as indicated by assessment   - Educate patient/family on patient safety including physical limitations  - Instruct patient to call for assistance with activity based on assessment  - Modify environment to reduce risk of injury  - Consider OT/PT consult to assist with strengthening/mobility  Outcome: Progressing  Goal: Maintain or return to baseline ADL function  Description  INTERVENTIONS:  -  Assess patient's ability to carry out ADLs; assess patient's baseline for ADL function and identify physical deficits which impact ability to perform ADLs (bathing, care of mouth/teeth, toileting, grooming, dressing, etc )  - Assess/evaluate cause of self-care deficits   - Assess range of motion  - Assess patient's mobility; develop plan if impaired  - Assess patient's need for assistive devices and provide as appropriate  - Encourage maximum independence but intervene and supervise when necessary  - Involve family in performance of ADLs  - Assess for home care needs following discharge   - Consider OT consult to assist with ADL evaluation and planning for discharge  - Provide patient education as appropriate  Outcome: Progressing  Goal: Maintain or return mobility status to optimal level  Description  INTERVENTIONS:  - Assess patient's baseline mobility status (ambulation, transfers, stairs, etc )    - Identify cognitive and physical deficits and behaviors that affect mobility  - Identify mobility aids required to assist with transfers and/or ambulation (gait belt, sit-to-stand, lift, walker, cane, etc )  - [place] fall precautions as indicated by assessment  - Record patient progress and toleration of activity level on Mobility SBAR; progress patient to next Phase/Stage  - Instruct patient to call for assistance with activity based on assessment  - Consider rehabilitation consult to assist with strengthening/weightbearing, etc   Outcome: Progressing     Problem: DISCHARGE PLANNING  Goal: Discharge to home or other facility with appropriate resources  Description  INTERVENTIONS:  - Identify barriers to discharge w/patient and caregiver  - Arrange for needed discharge resources and transportation as appropriate  - Identify discharge learning needs (meds, wound care, etc )  - Arrange for interpretive services to assist at discharge as needed  - Refer to Case Management Department for coordinating discharge planning if the patient needs post-hospital services based on physician/advanced practitioner order or complex needs related to functional status, cognitive ability, or social support system  Outcome: Progressing     Problem: Knowledge Deficit  Goal: Patient/family/caregiver demonstrates understanding of disease process, treatment plan, medications, and discharge instructions  Description  Complete learning assessment and assess knowledge base  Interventions:  - Provide teaching at level of understanding  - Provide teaching via preferred learning methods  Outcome: Progressing     Problem: Nutrition/Hydration-ADULT  Goal: Nutrient/Hydration intake appropriate for improving, restoring or maintaining nutritional needs  Description  Monitor and assess patient's nutrition/hydration status for malnutrition  Collaborate with interdisciplinary team and initiate plan and interventions as ordered  Monitor patient's weight and dietary intake as ordered or per policy  Utilize nutrition screening tool and intervene as necessary  Determine patient's food preferences and provide high-protein, high-caloric foods as appropriate       INTERVENTIONS:  - Monitor oral intake, urinary output, labs, and treatment plans  - Assess nutrition and hydration status and recommend course of action  - Evaluate amount of meals eaten  - Assist patient with eating if necessary   - Allow adequate time for meals  - Recommend/ encourage appropriate diets, oral nutritional supplements, and vitamin/mineral supplements  - Order, calculate, and assess calorie counts as needed  - Assess need for intravenous fluids  - Provide specific nutrition/hydration education as appropriate  - Include patient/family/caregiver in decisions related to nutrition   Outcome: Progressing     Problem: Potential for Falls  Goal: Patient will remain free of falls  Description  INTERVENTIONS:  - Assess patient frequently for physical needs  -  Identify cognitive and physical deficits and behaviors that affect risk of falls    -  [place] fall precautions as indicated by assessment   - Educate patient/family on patient safety including physical limitations  - Instruct patient to call for assistance with activity based on assessment  - Modify environment to reduce risk of injury  - Consider OT/PT consult to assist with strengthening/mobility  Outcome: Progressing     Problem: GENITOURINARY - ADULT  Goal: Maintains or returns to baseline urinary function  Description  INTERVENTIONS:  - Assess urinary function  - Encourage oral fluids to ensure adequate hydration if ordered  - Administer IV fluids as ordered to ensure adequate hydration  - Administer ordered medications as needed  - Offer frequent toileting  - Follow urinary retention protocol if ordered  Outcome: Progressing  Goal: Absence of urinary retention  Description  INTERVENTIONS:  - Assess patients ability to void and empty bladder  - Monitor I/O  - Bladder scan as needed  - Discuss with physician/AP medications to alleviate retention as needed  - Discuss catheterization for long term situations as appropriate  Outcome: Progressing  Goal: Urinary catheter remains patent  Description  INTERVENTIONS:  - Assess patency of urinary catheter  - If patient has a chronic teixeira, consider changing catheter if non-functioning  - Follow guidelines for intermittent irrigation of non-functioning urinary catheter  Outcome: Progressing

## 2020-07-26 LAB
ANION GAP SERPL CALCULATED.3IONS-SCNC: 6 MMOL/L (ref 4–13)
BUN SERPL-MCNC: 12 MG/DL (ref 5–25)
CALCIUM SERPL-MCNC: 8.9 MG/DL (ref 8.3–10.1)
CHLORIDE SERPL-SCNC: 105 MMOL/L (ref 100–108)
CO2 SERPL-SCNC: 27 MMOL/L (ref 21–32)
CREAT SERPL-MCNC: 1.03 MG/DL (ref 0.6–1.3)
ERYTHROCYTE [DISTWIDTH] IN BLOOD BY AUTOMATED COUNT: 13 % (ref 11.6–15.1)
GFR SERPL CREATININE-BSD FRML MDRD: 63 ML/MIN/1.73SQ M
GLUCOSE SERPL-MCNC: 94 MG/DL (ref 65–140)
HCT VFR BLD AUTO: 46.5 % (ref 34.8–46.1)
HGB BLD-MCNC: 15 G/DL (ref 11.5–15.4)
MCH RBC QN AUTO: 28.8 PG (ref 26.8–34.3)
MCHC RBC AUTO-ENTMCNC: 32.3 G/DL (ref 31.4–37.4)
MCV RBC AUTO: 89 FL (ref 82–98)
PLATELET # BLD AUTO: 232 THOUSANDS/UL (ref 149–390)
PMV BLD AUTO: 11.7 FL (ref 8.9–12.7)
POTASSIUM SERPL-SCNC: 4.5 MMOL/L (ref 3.5–5.3)
RBC # BLD AUTO: 5.2 MILLION/UL (ref 3.81–5.12)
SODIUM SERPL-SCNC: 138 MMOL/L (ref 136–145)
WBC # BLD AUTO: 11.84 THOUSAND/UL (ref 4.31–10.16)

## 2020-07-26 PROCEDURE — 80048 BASIC METABOLIC PNL TOTAL CA: CPT | Performed by: FAMILY MEDICINE

## 2020-07-26 PROCEDURE — 99239 HOSP IP/OBS DSCHRG MGMT >30: CPT | Performed by: FAMILY MEDICINE

## 2020-07-26 PROCEDURE — 85027 COMPLETE CBC AUTOMATED: CPT | Performed by: FAMILY MEDICINE

## 2020-07-26 RX ORDER — TAMSULOSIN HYDROCHLORIDE 0.4 MG/1
0.4 CAPSULE ORAL
Qty: 14 CAPSULE | Refills: 0 | Status: SHIPPED | OUTPATIENT
Start: 2020-07-26

## 2020-07-26 RX ORDER — ACETAMINOPHEN 325 MG/1
650 TABLET ORAL EVERY 6 HOURS PRN
Qty: 30 TABLET | Refills: 0 | Status: SHIPPED | OUTPATIENT
Start: 2020-07-26

## 2020-07-26 RX ORDER — TRAMADOL HYDROCHLORIDE 50 MG/1
50 TABLET ORAL EVERY 8 HOURS PRN
Qty: 10 TABLET | Refills: 0 | Status: SHIPPED | OUTPATIENT
Start: 2020-07-26 | End: 2020-08-05

## 2020-07-26 RX ORDER — LEVOFLOXACIN 750 MG/1
750 TABLET ORAL EVERY 24 HOURS
Qty: 6 TABLET | Refills: 0 | Status: SHIPPED | OUTPATIENT
Start: 2020-07-27 | End: 2020-08-02

## 2020-07-26 RX ORDER — PHENAZOPYRIDINE HYDROCHLORIDE 100 MG/1
100 TABLET, FILM COATED ORAL
Qty: 15 TABLET | Refills: 0 | Status: SHIPPED | OUTPATIENT
Start: 2020-07-26 | End: 2020-07-31

## 2020-07-26 RX ADMIN — MONTELUKAST 10 MG: 10 TABLET, FILM COATED ORAL at 08:17

## 2020-07-26 RX ADMIN — LEVOTHYROXINE SODIUM 25 MCG: 25 TABLET ORAL at 05:45

## 2020-07-26 RX ADMIN — TOPIRAMATE 50 MG: 25 TABLET, FILM COATED ORAL at 08:17

## 2020-07-26 RX ADMIN — FLUTICASONE PROPIONATE 1 PUFF: 220 AEROSOL, METERED RESPIRATORY (INHALATION) at 08:19

## 2020-07-26 RX ADMIN — PANTOPRAZOLE SODIUM 20 MG: 20 TABLET, DELAYED RELEASE ORAL at 08:18

## 2020-07-26 RX ADMIN — PHENAZOPYRIDINE 100 MG: 100 TABLET ORAL at 08:17

## 2020-07-26 RX ADMIN — GABAPENTIN 600 MG: 300 CAPSULE ORAL at 08:17

## 2020-07-26 RX ADMIN — OXYCODONE HYDROCHLORIDE 5 MG: 5 TABLET ORAL at 04:28

## 2020-07-26 NOTE — DISCHARGE SUMMARY
Discharge- Sulema Brunner 1970, 44 y o  female MRN: [id number]    Unit/Bed#: -01 Encounter: [id number]    Primary Care Provider: Surjit Whitley DO   Date and time admitted to hospital: 7/23/2020 12:04 PM        * Acute cystitis with hematuria  Assessment & Plan  Patient found to have acute cystitis with hematuria present on admission  Patient is allergic to penicillins and cephalosporins and hence placed on Levaquin  Blood cultures negative and urine culture positive for E coli  CT abdomen pelvis reviewed which shows right-sided ureteropyelitis and also 3 kidney stones noted  Nonobstructing 12 mm, 6 mm and 4mm stones  Patient Dias catheter removed yesterday and patient is voiding well  Hematuria has resolved  Pelvic pain is also improving  Will discharge her home on 6 more days of oral Levaquin and have her follow up outpatient with Urology in regards to having stone removal    Kidney stones  Assessment & Plan  Patient appears to be having renal colic  Multiple kidney stones noted on the right side  History of kidney stones in the past requiring removal   No obstructing stone noted at this time  Needs outpatient follow-up with urology for stone removal   Referral sent to Dr Ann Kulkarni by [facility] referral system for outpatient follow-up    Urinary retention  Assessment & Plan  Patient presented to the ED with urinary retention symptoms  She had Dias catheter placed as she was having difficulty urinating  Hematuria has now resolved  Voiding trial was successful  Continue Flomax for 2 more weeks and then discontinue  Mild intermittent asthma without complication  Assessment & Plan  Stable at this time and not in exacerbation  Continue Singulair and albuterol p r n  Along with Flovent    Chronic pain syndrome  Assessment & Plan  Patient has known history of fibromyalgia and chronic pain syndrome  Continue home regimen of Topamax, Neurontin      Morbid obesity ([facility] [address] )  Assessment & Plan  Counseled on diet exercise and lifestyle modification      Discharging Physician / Practitioner: Farheen Coronel MD  PCP: Caitlin Allison DO  Admission Date:   Admission Orders (From admission, onward)     Ordered        07/23/20 1442  Inpatient Admission  Once                   Discharge Date: 07/26/20    Resolved Problems  Date Reviewed: 7/26/2020    None          Consultations During Hospital Stay:  · None    Procedures Performed:   · Dias catheter placement and then voiding trial    Significant Findings / Test Results:   Us Kidney And Bladder    Result Date: 7/25/2020  Impression: Nonobstructive renal calculi similar to CT  Workstation performed: [id number]     Ct Abdomen Pelvis With Contrast    Result Date: 7/23/2020  Impression: 1  Nonobstructing right intrarenal calculi as noted, the largest calculus in the upper pole measuring 12 mm in greatest dimension  2   Urothelial thickening and enhancement of the right renal collecting system and ureter along its length with mild stranding surrounding the collecting system and ureter consistent with a nonspecific ureteropyelitis  There appears to be a concomitant cystitis as well  3   Postoperative change reflecting hysterectomy  Simple appearing 2 7 cm left adnexal cyst  4   Additional findings as noted  Workstation performed: [id number]     Incidental Findings:   · None     Test Results Pending at Discharge (will require follow up): · None     Outpatient Tests Requested:  · None    Complications:  None    Reason for Admission:  Hematuria and urinary retention    Hospital Course:     Mirza Gee is a 44 y o  female patient who originally presented to the hospital on 7/23/2020 due to hematuria and urinary retention  Patient started to have UTI symptoms with hematuria as well and pelvic pain and pressure and came to the ER  Found have acute urinary retention and had Dias catheter placed  Urine cultures positive for E coli    Initially placed on IV Levaquin as she is allergic to penicillin and cephalosporins  She is responding well to antibiotics  A voiding trial done and was successful yesterday  She does have right kidney several stones which are nonobstructing at this time  Advised to follow up outpatient with urology  Continue oral antibiotic course for 6 more days and Flomax for 2 weeks  She has history of kidney stones in the past and has had them removed in the past     Please see above list of diagnoses and related plan for additional information  Condition at Discharge: good     Discharge Day Visit / Exam:     Subjective:  Patient denies any chest pain or shortness of breath or abdominal pain  She states that her pelvic pain and pressure is down to 2-3 x 10 now  No constipation reported  No hematuria  Vitals: Blood Pressure: 108/63 (07/26/20 0737)  Pulse: 70 (07/26/20 0737)  Temperature: (!) 97 4 °F (36 3 °C) (07/26/20 0737)  Temp Source: Oral (07/23/20 1530)  Respirations: 20 (07/26/20 0737)  Height: 5' 2" (157 5 cm) (07/24/20 1057)  Weight - Scale: 117 kg (257 lb 15 oz) (07/23/20 1818)  SpO2: 97 % (07/26/20 0737)  Exam:   Physical Exam   Constitutional: She is oriented to person, place, and time  She appears well-developed and well-nourished  HENT:   Head: Normocephalic and atraumatic  Right Ear: External ear normal    Left Ear: External ear normal    Mouth/Throat: Oropharynx is clear and moist    Eyes: Conjunctivae and EOM are normal    Neck: Normal range of motion  Neck supple  Cardiovascular: Normal rate, regular rhythm and normal heart sounds  Pulmonary/Chest: Effort normal and breath sounds normal    Abdominal: Soft  Bowel sounds are normal  She exhibits no mass  There is no tenderness  There is no rebound and no guarding  Genitourinary:   Genitourinary Comments: deferred   Musculoskeletal: Normal range of motion  Neurological: She is alert and oriented to person, place, and time  She has normal reflexes     Cranial nerves 2-12 are normal   Normal neurological exam   Skin: Skin is warm and dry  No rash noted  Psychiatric: She has a normal mood and affect  Nursing note and vitals reviewed  Discussion with Family:  None    Discharge instructions/Information to patient and family:   See after visit summary for information provided to patient and family  Provisions for Follow-Up Care:  See after visit summary for information related to follow-up care and any pertinent home health orders  Disposition:     Home    For Discharges to [facility] SNF:   · Not Applicable to this Patient - Not Applicable to this Patient    Planned Readmission:  None     Discharge Statement:  I spent 35 minutes discharging the patient  This time was spent on the day of discharge  I had direct contact with the patient on the day of discharge  Greater than 50% of the total time was spent examining patient, answering all patient questions, arranging and discussing plan of care with patient as well as directly providing post-discharge instructions  Additional time then spent on discharge activities  Discharge Medications:  See after visit summary for reconciled discharge medications provided to patient and family        ** Please Note: This note has been constructed using a voice recognition system **

## 2020-07-26 NOTE — ASSESSMENT & PLAN NOTE
Patient appears to be having renal colic  Multiple kidney stones noted on the right side  History of kidney stones in the past requiring removal   No obstructing stone noted at this time    Needs outpatient follow-up with urology for stone removal   Referral sent to Dr Eron Prince by [facility] referral system for outpatient follow-up

## 2020-07-26 NOTE — ASSESSMENT & PLAN NOTE
Patient found to have acute cystitis with hematuria present on admission  Patient is allergic to penicillins and cephalosporins and hence placed on Levaquin  Blood cultures negative and urine culture positive for E coli  CT abdomen pelvis reviewed which shows right-sided ureteropyelitis and also 3 kidney stones noted  Nonobstructing 12 mm, 6 mm and 4mm stones  Patient Dias catheter removed yesterday and patient is voiding well  Hematuria has resolved  Pelvic pain is also improving    Will discharge her home on 6 more days of oral Levaquin and have her follow up outpatient with Urology in regards to having stone removal

## 2020-07-26 NOTE — PLAN OF CARE
Problem: PAIN - ADULT  Goal: Verbalizes/displays adequate comfort level or baseline comfort level  Description  Interventions:  - Encourage patient to monitor pain and request assistance  - Assess pain using appropriate pain scale  - Administer analgesics based on type and severity of pain and evaluate response  - Implement non-pharmacological measures as appropriate and evaluate response  - Consider cultural and social influences on pain and pain management  - Notify physician/advanced practitioner if interventions unsuccessful or patient reports new pain  Outcome: Progressing     Problem: INFECTION - ADULT  Goal: Absence or prevention of progression during hospitalization  Description  INTERVENTIONS:  - Assess and monitor for signs and symptoms of infection  - Monitor lab/diagnostic results  - Monitor all insertion sites, i e  indwelling lines, tubes, and drains  - Monitor endotracheal if appropriate and nasal secretions for changes in amount and color  - [place] appropriate cooling/warming therapies per order  - Administer medications as ordered  - Instruct and encourage patient and family to use good hand hygiene technique  - Identify and instruct in appropriate isolation precautions for identified infection/condition  Outcome: Progressing  Goal: Absence of fever/infection during neutropenic period  Description  INTERVENTIONS:  - Monitor WBC    Outcome: Progressing     Problem: SAFETY ADULT  Goal: Patient will remain free of falls  Description  INTERVENTIONS:  - Assess patient frequently for physical needs  -  Identify cognitive and physical deficits and behaviors that affect risk of falls    -  [place] fall precautions as indicated by assessment   - Educate patient/family on patient safety including physical limitations  - Instruct patient to call for assistance with activity based on assessment  - Modify environment to reduce risk of injury  - Consider OT/PT consult to assist with strengthening/mobility  Outcome: Progressing  Goal: Maintain or return to baseline ADL function  Description  INTERVENTIONS:  -  Assess patient's ability to carry out ADLs; assess patient's baseline for ADL function and identify physical deficits which impact ability to perform ADLs (bathing, care of mouth/teeth, toileting, grooming, dressing, etc )  - Assess/evaluate cause of self-care deficits   - Assess range of motion  - Assess patient's mobility; develop plan if impaired  - Assess patient's need for assistive devices and provide as appropriate  - Encourage maximum independence but intervene and supervise when necessary  - Involve family in performance of ADLs  - Assess for home care needs following discharge   - Consider OT consult to assist with ADL evaluation and planning for discharge  - Provide patient education as appropriate  Outcome: Progressing  Goal: Maintain or return mobility status to optimal level  Description  INTERVENTIONS:  - Assess patient's baseline mobility status (ambulation, transfers, stairs, etc )    - Identify cognitive and physical deficits and behaviors that affect mobility  - Identify mobility aids required to assist with transfers and/or ambulation (gait belt, sit-to-stand, lift, walker, cane, etc )  - [place] fall precautions as indicated by assessment  - Record patient progress and toleration of activity level on Mobility SBAR; progress patient to next Phase/Stage  - Instruct patient to call for assistance with activity based on assessment  - Consider rehabilitation consult to assist with strengthening/weightbearing, etc   Outcome: Progressing     Problem: DISCHARGE PLANNING  Goal: Discharge to home or other facility with appropriate resources  Description  INTERVENTIONS:  - Identify barriers to discharge w/patient and caregiver  - Arrange for needed discharge resources and transportation as appropriate  - Identify discharge learning needs (meds, wound care, etc )  - Arrange for interpretive services to assist at discharge as needed  - Refer to Case Management Department for coordinating discharge planning if the patient needs post-hospital services based on physician/advanced practitioner order or complex needs related to functional status, cognitive ability, or social support system  Outcome: Progressing     Problem: Knowledge Deficit  Goal: Patient/family/caregiver demonstrates understanding of disease process, treatment plan, medications, and discharge instructions  Description  Complete learning assessment and assess knowledge base  Interventions:  - Provide teaching at level of understanding  - Provide teaching via preferred learning methods  Outcome: Progressing     Problem: Nutrition/Hydration-ADULT  Goal: Nutrient/Hydration intake appropriate for improving, restoring or maintaining nutritional needs  Description  Monitor and assess patient's nutrition/hydration status for malnutrition  Collaborate with interdisciplinary team and initiate plan and interventions as ordered  Monitor patient's weight and dietary intake as ordered or per policy  Utilize nutrition screening tool and intervene as necessary  Determine patient's food preferences and provide high-protein, high-caloric foods as appropriate       INTERVENTIONS:  - Monitor oral intake, urinary output, labs, and treatment plans  - Assess nutrition and hydration status and recommend course of action  - Evaluate amount of meals eaten  - Assist patient with eating if necessary   - Allow adequate time for meals  - Recommend/ encourage appropriate diets, oral nutritional supplements, and vitamin/mineral supplements  - Order, calculate, and assess calorie counts as needed  - Assess need for intravenous fluids  - Provide specific nutrition/hydration education as appropriate  - Include patient/family/caregiver in decisions related to nutrition   Outcome: Progressing     Problem: Potential for Falls  Goal: Patient will remain free of falls  Description  INTERVENTIONS:  - Assess patient frequently for physical needs  -  Identify cognitive and physical deficits and behaviors that affect risk of falls    -  [place] fall precautions as indicated by assessment   - Educate patient/family on patient safety including physical limitations  - Instruct patient to call for assistance with activity based on assessment  - Modify environment to reduce risk of injury  - Consider OT/PT consult to assist with strengthening/mobility  Outcome: Progressing     Problem: GENITOURINARY - ADULT  Goal: Maintains or returns to baseline urinary function  Description  INTERVENTIONS:  - Assess urinary function  - Encourage oral fluids to ensure adequate hydration if ordered  - Administer IV fluids as ordered to ensure adequate hydration  - Administer ordered medications as needed  - Offer frequent toileting  - Follow urinary retention protocol if ordered  Outcome: Progressing  Goal: Absence of urinary retention  Description  INTERVENTIONS:  - Assess patients ability to void and empty bladder  - Monitor I/O  - Bladder scan as needed  - Discuss with physician/AP medications to alleviate retention as needed  - Discuss catheterization for long term situations as appropriate  Outcome: Progressing  Goal: Urinary catheter remains patent  Description  INTERVENTIONS:  - Assess patency of urinary catheter  - If patient has a chronic teixeira, consider changing catheter if non-functioning  - Follow guidelines for intermittent irrigation of non-functioning urinary catheter  Outcome: Progressing

## 2020-07-26 NOTE — ASSESSMENT & PLAN NOTE
Patient presented to the ED with urinary retention symptoms  She had Dias catheter placed as she was having difficulty urinating  Hematuria has now resolved  Voiding trial was successful  Continue Flomax for 2 more weeks and then discontinue

## 2020-07-28 LAB
BACTERIA BLD CULT: NORMAL
BACTERIA BLD CULT: NORMAL

## 2020-07-29 VITALS
RESPIRATION RATE: 20 BRPM | OXYGEN SATURATION: 97 % | WEIGHT: 257.94 LBS | SYSTOLIC BLOOD PRESSURE: 108 MMHG | BODY MASS INDEX: 47.47 KG/M2 | HEIGHT: 62 IN | TEMPERATURE: 97.4 F | DIASTOLIC BLOOD PRESSURE: 63 MMHG | HEART RATE: 70 BPM

## 2020-08-18 NOTE — UTILIZATION REVIEW
Notification of Discharge  This is a Notification of Discharge from our facility [address]  Please be advised that this patient has been discharge from our facility  Below you will find the admission and discharge date and time including the patients disposition  PRESENTATION DATE: 7/23/2020 12:04 PM  OBS ADMISSION DATE:   IP ADMISSION DATE: 7/23/20 1442   DISCHARGE DATE: 7/26/2020 11:42 AM  DISPOSITION: Home with [facility] with [facility]   Admission Orders listed below:  Admission Orders (From admission, onward)     Ordered        07/23/20 1442  Inpatient Admission  Once                   Please contact the UR Department if additional information is required to close this patient's authorization/case  [address] [employer] Utilization Review Department  Main: [phone] x carefully listen to the prompts  All voicemails are confidential   [email or web address]  Send all requests for admission clinical reviews, approved or denied determinations and any other requests to dedicated fax number below belonging to the campus where the patient is receiving treatment   List of dedicated fax numbers:  [address] DENIALS (Administrative/Medical Necessity) [phone]   [address] (Maternity/NICU/Pediatrics) [phone]   Kirt Glass [phone]   Nya Pacheco [phone]   Torres Carranza [phone]   James Valencia [address] [phone]   [facility]  [phone]   [address] [facility] [address]  [address] [address] [address] [facility] [phone]

## 2020-10-30 ENCOUNTER — RX ONLY (RX ONLY)
Age: 50
End: 2020-10-30

## 2020-10-30 ENCOUNTER — DOCTOR'S OFFICE (OUTPATIENT)
Dept: URBAN - NONMETROPOLITAN AREA CLINIC 1 | Facility: CLINIC | Age: 50
Setting detail: OPHTHALMOLOGY
End: 2020-10-30
Payer: COMMERCIAL

## 2020-10-30 DIAGNOSIS — H52.4: ICD-10-CM

## 2020-10-30 DIAGNOSIS — H52.223: ICD-10-CM

## 2020-10-30 PROCEDURE — 92014 COMPRE OPH EXAM EST PT 1/>: CPT | Performed by: OPTOMETRIST

## 2020-10-30 ASSESSMENT — REFRACTION_CURRENTRX
OS_VPRISM_DIRECTION: BF
OD_CYLINDER: -3.00
OS_OVR_VA: 20/
OS_CYLINDER: -2.50
OS_SPHERE: -1.25
OD_AXIS: 098
OD_SPHERE: -0.75
OS_ADD: +2.50
OS_AXIS: 083
OD_OVR_VA: 20/
OD_VPRISM_DIRECTION: BF
OD_ADD: +2.50

## 2020-10-30 ASSESSMENT — VISUAL ACUITY
OD_BCVA: 20/25
OS_BCVA: 20/25-1

## 2020-10-30 ASSESSMENT — REFRACTION_AUTOREFRACTION
OD_AXIS: 099
OD_CYLINDER: -1.50
OS_AXIS: 090
OS_CYLINDER: -1.50
OS_SPHERE: -1.25
OD_SPHERE: -1.25

## 2020-10-30 ASSESSMENT — REFRACTION_MANIFEST
OS_CYLINDER: -1.75
OD_SPHERE: -1.50
OS_ADD: +2.50
OS_VA1: 20/25-2
OS_SPHERE: -1.25
OS_VA2: 20/25-2
OD_ADD: +2.50
OD_CYLINDER: -1.50
OS_AXIS: 090
OD_VA2: 20/25-2
OD_VA1: 20/25-2
OD_AXIS: 100

## 2020-10-30 ASSESSMENT — SPHEQUIV_DERIVED
OD_SPHEQUIV: -2
OS_SPHEQUIV: -2.125
OS_SPHEQUIV: -2
OD_SPHEQUIV: -2.25

## 2020-10-30 ASSESSMENT — TONOMETRY
OS_IOP_MMHG: 15
OD_IOP_MMHG: 15

## 2020-10-30 ASSESSMENT — CONFRONTATIONAL VISUAL FIELD TEST (CVF)
OS_FINDINGS: FULL
OD_FINDINGS: FULL

## 2021-02-25 ENCOUNTER — HOSPITAL ENCOUNTER (INPATIENT)
Facility: HOSPITAL | Age: 51
LOS: 3 days | Discharge: HOME | DRG: 720 | End: 2021-02-28
Attending: INTERNAL MEDICINE | Admitting: INTERNAL MEDICINE
Payer: COMMERCIAL

## 2021-02-25 ENCOUNTER — APPOINTMENT (EMERGENCY)
Dept: CT IMAGING | Facility: HOSPITAL | Age: 51
End: 2021-02-25
Payer: COMMERCIAL

## 2021-02-25 ENCOUNTER — ANESTHESIA EVENT (INPATIENT)
Dept: PERIOP | Facility: HOSPITAL | Age: 51
DRG: 720 | End: 2021-02-25
Payer: COMMERCIAL

## 2021-02-25 ENCOUNTER — ANESTHESIA (INPATIENT)
Dept: PERIOP | Facility: HOSPITAL | Age: 51
DRG: 720 | End: 2021-02-25
Payer: COMMERCIAL

## 2021-02-25 ENCOUNTER — HOSPITAL ENCOUNTER (OUTPATIENT)
Dept: RADIOLOGY | Facility: HOSPITAL | Age: 51
Discharge: HOME | DRG: 720 | End: 2021-02-25
Payer: COMMERCIAL

## 2021-02-25 ENCOUNTER — HOSPITAL ENCOUNTER (EMERGENCY)
Facility: HOSPITAL | Age: 51
End: 2021-02-25
Attending: EMERGENCY MEDICINE
Payer: COMMERCIAL

## 2021-02-25 ENCOUNTER — APPOINTMENT (EMERGENCY)
Dept: RADIOLOGY | Facility: HOSPITAL | Age: 51
End: 2021-02-25
Payer: COMMERCIAL

## 2021-02-25 VITALS
BODY MASS INDEX: 48.4 KG/M2 | HEART RATE: 86 BPM | TEMPERATURE: 99.7 F | OXYGEN SATURATION: 96 % | RESPIRATION RATE: 20 BRPM | SYSTOLIC BLOOD PRESSURE: 107 MMHG | HEIGHT: 62 IN | DIASTOLIC BLOOD PRESSURE: 62 MMHG | WEIGHT: 263.01 LBS

## 2021-02-25 DIAGNOSIS — A41.9 SEPSIS (HCC): ICD-10-CM

## 2021-02-25 DIAGNOSIS — N13.9 OBSTRUCTIVE UROPATHY: ICD-10-CM

## 2021-02-25 DIAGNOSIS — N20.1 URETERAL CALCULI: ICD-10-CM

## 2021-02-25 DIAGNOSIS — N30.01 ACUTE CYSTITIS WITH HEMATURIA: Primary | ICD-10-CM

## 2021-02-25 DIAGNOSIS — N13.30 HYDRONEPHROSIS OF RIGHT KIDNEY: ICD-10-CM

## 2021-02-25 DIAGNOSIS — N17.9 AKI (ACUTE KIDNEY INJURY) (HCC): ICD-10-CM

## 2021-02-25 DIAGNOSIS — N20.1 RIGHT URETERAL STONE: Primary | ICD-10-CM

## 2021-02-25 DIAGNOSIS — N20.1 RIGHT URETERAL STONE: ICD-10-CM

## 2021-02-25 DIAGNOSIS — N30.01 ACUTE CYSTITIS WITH HEMATURIA: ICD-10-CM

## 2021-02-25 DIAGNOSIS — N13.30 HYDRONEPHROSIS OF RIGHT KIDNEY: Primary | ICD-10-CM

## 2021-02-25 PROBLEM — E87.1 HYPONATREMIA: Status: ACTIVE | Noted: 2021-02-25

## 2021-02-25 PROBLEM — N18.30 CHRONIC KIDNEY DISEASE, STAGE 3 (HCC): Status: ACTIVE | Noted: 2021-02-25

## 2021-02-25 PROBLEM — E03.9 ACQUIRED HYPOTHYROIDISM: Status: ACTIVE | Noted: 2021-02-25

## 2021-02-25 LAB
ALBUMIN SERPL BCP-MCNC: 3.2 G/DL (ref 3.5–5)
ALP SERPL-CCNC: 117 U/L (ref 46–116)
ALT SERPL W P-5'-P-CCNC: 28 U/L (ref 12–78)
ANION GAP SERPL CALCULATED.3IONS-SCNC: 10 MMOL/L (ref 4–13)
APTT PPP: 29 SECONDS (ref 23–37)
AST SERPL W P-5'-P-CCNC: 30 U/L (ref 5–45)
BACTERIA UR QL AUTO: ABNORMAL /HPF
BASOPHILS # BLD AUTO: 0.08 THOUSANDS/ΜL (ref 0–0.1)
BASOPHILS NFR BLD AUTO: 1 % (ref 0–1)
BILIRUB SERPL-MCNC: 0.92 MG/DL (ref 0.2–1)
BILIRUB UR QL STRIP: NEGATIVE
BUN SERPL-MCNC: 12 MG/DL (ref 5–25)
CALCIUM ALBUM COR SERPL-MCNC: 9.4 MG/DL (ref 8.3–10.1)
CALCIUM SERPL-MCNC: 8.8 MG/DL (ref 8.3–10.1)
CHLORIDE SERPL-SCNC: 99 MMOL/L (ref 100–108)
CLARITY UR: ABNORMAL
CO2 SERPL-SCNC: 24 MMOL/L (ref 21–32)
COLOR UR: YELLOW
CREAT SERPL-MCNC: 1.48 MG/DL (ref 0.6–1.3)
EOSINOPHIL # BLD AUTO: 0.23 THOUSAND/ΜL (ref 0–0.61)
EOSINOPHIL NFR BLD AUTO: 1 % (ref 0–6)
ERYTHROCYTE [DISTWIDTH] IN BLOOD BY AUTOMATED COUNT: 12.4 % (ref 11.6–15.1)
FLUAV RNA RESP QL NAA+PROBE: NEGATIVE
FLUBV RNA RESP QL NAA+PROBE: NEGATIVE
GFR SERPL CREATININE-BSD FRML MDRD: 40 ML/MIN/1.73SQ M
GLUCOSE SERPL-MCNC: 105 MG/DL (ref 65–140)
GLUCOSE UR STRIP-MCNC: NEGATIVE MG/DL
HCT VFR BLD AUTO: 46.9 % (ref 34.8–46.1)
HGB BLD-MCNC: 15.6 G/DL (ref 11.5–15.4)
HGB UR QL STRIP.AUTO: ABNORMAL
IMM GRANULOCYTES # BLD AUTO: 0.11 THOUSAND/UL (ref 0–0.2)
IMM GRANULOCYTES NFR BLD AUTO: 1 % (ref 0–2)
INR PPP: 0.97 (ref 0.84–1.19)
KETONES UR STRIP-MCNC: NEGATIVE MG/DL
LACTATE SERPL-SCNC: 1 MMOL/L (ref 0.5–2)
LEUKOCYTE ESTERASE UR QL STRIP: ABNORMAL
LIPASE SERPL-CCNC: 51 U/L (ref 73–393)
LYMPHOCYTES # BLD AUTO: 2.45 THOUSANDS/ΜL (ref 0.6–4.47)
LYMPHOCYTES NFR BLD AUTO: 14 % (ref 14–44)
MAGNESIUM SERPL-MCNC: 1.7 MG/DL (ref 1.6–2.6)
MCH RBC QN AUTO: 28.8 PG (ref 26.8–34.3)
MCHC RBC AUTO-ENTMCNC: 33.3 G/DL (ref 31.4–37.4)
MCV RBC AUTO: 87 FL (ref 82–98)
MONOCYTES # BLD AUTO: 1.31 THOUSAND/ΜL (ref 0.17–1.22)
MONOCYTES NFR BLD AUTO: 7 % (ref 4–12)
NEUTROPHILS # BLD AUTO: 13.5 THOUSANDS/ΜL (ref 1.85–7.62)
NEUTS SEG NFR BLD AUTO: 76 % (ref 43–75)
NITRITE UR QL STRIP: NEGATIVE
NON-SQ EPI CELLS URNS QL MICRO: ABNORMAL /HPF
NRBC BLD AUTO-RTO: 0 /100 WBCS
PH UR STRIP.AUTO: 7.5 [PH]
PLATELET # BLD AUTO: 253 THOUSANDS/UL (ref 149–390)
PMV BLD AUTO: 12 FL (ref 8.9–12.7)
POTASSIUM SERPL-SCNC: 4.5 MMOL/L (ref 3.5–5.3)
PROT SERPL-MCNC: 7.7 G/DL (ref 6.4–8.2)
PROT UR STRIP-MCNC: NEGATIVE MG/DL
PROTHROMBIN TIME: 12.7 SECONDS (ref 11.6–14.5)
RBC # BLD AUTO: 5.41 MILLION/UL (ref 3.81–5.12)
RBC #/AREA URNS AUTO: ABNORMAL /HPF
RSV RNA RESP QL NAA+PROBE: NEGATIVE
SARS-COV-2 RNA RESP QL NAA+PROBE: NEGATIVE
SODIUM SERPL-SCNC: 133 MMOL/L (ref 136–145)
SP GR UR STRIP.AUTO: 1.01 (ref 1–1.03)
UROBILINOGEN UR QL STRIP.AUTO: 0.2 E.U./DL
WBC # BLD AUTO: 17.68 THOUSAND/UL (ref 4.31–10.16)
WBC #/AREA URNS AUTO: ABNORMAL /HPF

## 2021-02-25 PROCEDURE — BT1D1ZZ FLUOROSCOPY OF RIGHT KIDNEY, URETER AND BLADDER USING LOW OSMOLAR CONTRAST: ICD-10-PCS | Performed by: UROLOGY

## 2021-02-25 PROCEDURE — 96375 TX/PRO/DX INJ NEW DRUG ADDON: CPT

## 2021-02-25 PROCEDURE — 99291 CRITICAL CARE FIRST HOUR: CPT | Performed by: EMERGENCY MEDICINE

## 2021-02-25 PROCEDURE — 74420 UROGRAPHY RTRGR +-KUB: CPT

## 2021-02-25 PROCEDURE — 87077 CULTURE AEROBIC IDENTIFY: CPT | Performed by: UROLOGY

## 2021-02-25 PROCEDURE — 36415 COLL VENOUS BLD VENIPUNCTURE: CPT | Performed by: EMERGENCY MEDICINE

## 2021-02-25 PROCEDURE — 74176 CT ABD & PELVIS W/O CONTRAST: CPT

## 2021-02-25 PROCEDURE — G1004 CDSM NDSC: HCPCS

## 2021-02-25 PROCEDURE — C2617 STENT, NON-COR, TEM W/O DEL: HCPCS | Performed by: UROLOGY

## 2021-02-25 PROCEDURE — 83605 ASSAY OF LACTIC ACID: CPT | Performed by: EMERGENCY MEDICINE

## 2021-02-25 PROCEDURE — 99223 1ST HOSP IP/OBS HIGH 75: CPT | Performed by: INTERNAL MEDICINE

## 2021-02-25 PROCEDURE — 0T768DZ DILATION OF RIGHT URETER WITH INTRALUMINAL DEVICE, VIA NATURAL OR ARTIFICIAL OPENING ENDOSCOPIC: ICD-10-PCS | Performed by: UROLOGY

## 2021-02-25 PROCEDURE — 85025 COMPLETE CBC W/AUTO DIFF WBC: CPT | Performed by: EMERGENCY MEDICINE

## 2021-02-25 PROCEDURE — 85610 PROTHROMBIN TIME: CPT | Performed by: EMERGENCY MEDICINE

## 2021-02-25 PROCEDURE — 84145 PROCALCITONIN (PCT): CPT | Performed by: EMERGENCY MEDICINE

## 2021-02-25 PROCEDURE — 85730 THROMBOPLASTIN TIME PARTIAL: CPT | Performed by: EMERGENCY MEDICINE

## 2021-02-25 PROCEDURE — 87086 URINE CULTURE/COLONY COUNT: CPT | Performed by: UROLOGY

## 2021-02-25 PROCEDURE — 96361 HYDRATE IV INFUSION ADD-ON: CPT

## 2021-02-25 PROCEDURE — 83690 ASSAY OF LIPASE: CPT | Performed by: EMERGENCY MEDICINE

## 2021-02-25 PROCEDURE — 87040 BLOOD CULTURE FOR BACTERIA: CPT | Performed by: EMERGENCY MEDICINE

## 2021-02-25 PROCEDURE — 96365 THER/PROPH/DIAG IV INF INIT: CPT

## 2021-02-25 PROCEDURE — 81001 URINALYSIS AUTO W/SCOPE: CPT | Performed by: EMERGENCY MEDICINE

## 2021-02-25 PROCEDURE — 87186 SC STD MICRODIL/AGAR DIL: CPT | Performed by: UROLOGY

## 2021-02-25 PROCEDURE — 52332 CYSTOSCOPY AND TREATMENT: CPT | Performed by: UROLOGY

## 2021-02-25 PROCEDURE — 87086 URINE CULTURE/COLONY COUNT: CPT | Performed by: EMERGENCY MEDICINE

## 2021-02-25 PROCEDURE — 83735 ASSAY OF MAGNESIUM: CPT | Performed by: EMERGENCY MEDICINE

## 2021-02-25 PROCEDURE — 0241U HB NFCT DS VIR RESP RNA 4 TRGT: CPT | Performed by: EMERGENCY MEDICINE

## 2021-02-25 PROCEDURE — 99285 EMERGENCY DEPT VISIT HI MDM: CPT

## 2021-02-25 PROCEDURE — 80053 COMPREHEN METABOLIC PANEL: CPT | Performed by: EMERGENCY MEDICINE

## 2021-02-25 PROCEDURE — 99255 IP/OBS CONSLTJ NEW/EST HI 80: CPT | Performed by: UROLOGY

## 2021-02-25 PROCEDURE — C1769 GUIDE WIRE: HCPCS | Performed by: UROLOGY

## 2021-02-25 PROCEDURE — 71045 X-RAY EXAM CHEST 1 VIEW: CPT

## 2021-02-25 DEVICE — VARIABLE LENGTH INJECTION STENT SET
Type: IMPLANTABLE DEVICE | Site: URETER | Status: FUNCTIONAL
Brand: CONTOUR VL™ INJECTION STENT SET

## 2021-02-25 RX ORDER — FENTANYL CITRATE 50 UG/ML
INJECTION, SOLUTION INTRAMUSCULAR; INTRAVENOUS AS NEEDED
Status: DISCONTINUED | OUTPATIENT
Start: 2021-02-25 | End: 2021-02-25

## 2021-02-25 RX ORDER — TAMSULOSIN HYDROCHLORIDE 0.4 MG/1
0.4 CAPSULE ORAL
Status: DISCONTINUED | OUTPATIENT
Start: 2021-02-26 | End: 2021-02-28 | Stop reason: HOSPADM

## 2021-02-25 RX ORDER — KETAMINE HYDROCHLORIDE 50 MG/ML
INJECTION, SOLUTION, CONCENTRATE INTRAMUSCULAR; INTRAVENOUS AS NEEDED
Status: DISCONTINUED | OUTPATIENT
Start: 2021-02-25 | End: 2021-02-25

## 2021-02-25 RX ORDER — MIDAZOLAM HYDROCHLORIDE 2 MG/2ML
INJECTION, SOLUTION INTRAMUSCULAR; INTRAVENOUS AS NEEDED
Status: DISCONTINUED | OUTPATIENT
Start: 2021-02-25 | End: 2021-02-25

## 2021-02-25 RX ORDER — ONDANSETRON 2 MG/ML
4 INJECTION INTRAMUSCULAR; INTRAVENOUS ONCE AS NEEDED
Status: DISCONTINUED | OUTPATIENT
Start: 2021-02-25 | End: 2021-02-25

## 2021-02-25 RX ORDER — SODIUM CHLORIDE 9 MG/ML
100 INJECTION, SOLUTION INTRAVENOUS CONTINUOUS
Status: DISCONTINUED | OUTPATIENT
Start: 2021-02-25 | End: 2021-02-28 | Stop reason: HOSPADM

## 2021-02-25 RX ORDER — TOPIRAMATE 25 MG/1
50 TABLET ORAL EVERY 12 HOURS SCHEDULED
Status: DISCONTINUED | OUTPATIENT
Start: 2021-02-25 | End: 2021-02-28 | Stop reason: HOSPADM

## 2021-02-25 RX ORDER — FENTANYL CITRATE/PF 50 MCG/ML
50 SYRINGE (ML) INJECTION
Status: DISCONTINUED | OUTPATIENT
Start: 2021-02-25 | End: 2021-02-25 | Stop reason: HOSPADM

## 2021-02-25 RX ORDER — ONDANSETRON 2 MG/ML
INJECTION INTRAMUSCULAR; INTRAVENOUS AS NEEDED
Status: DISCONTINUED | OUTPATIENT
Start: 2021-02-25 | End: 2021-02-25

## 2021-02-25 RX ORDER — DEXAMETHASONE SODIUM PHOSPHATE 10 MG/ML
INJECTION, SOLUTION INTRAMUSCULAR; INTRAVENOUS AS NEEDED
Status: DISCONTINUED | OUTPATIENT
Start: 2021-02-25 | End: 2021-02-25

## 2021-02-25 RX ORDER — MONTELUKAST SODIUM 10 MG/1
10 TABLET ORAL DAILY
Status: DISCONTINUED | OUTPATIENT
Start: 2021-02-26 | End: 2021-02-28 | Stop reason: HOSPADM

## 2021-02-25 RX ORDER — ONDANSETRON 2 MG/ML
4 INJECTION INTRAMUSCULAR; INTRAVENOUS ONCE AS NEEDED
Status: DISCONTINUED | OUTPATIENT
Start: 2021-02-25 | End: 2021-02-25 | Stop reason: HOSPADM

## 2021-02-25 RX ORDER — OXYCODONE HYDROCHLORIDE 5 MG/1
5 TABLET ORAL EVERY 4 HOURS PRN
Status: DISCONTINUED | OUTPATIENT
Start: 2021-02-25 | End: 2021-02-28 | Stop reason: HOSPADM

## 2021-02-25 RX ORDER — METOCLOPRAMIDE HYDROCHLORIDE 5 MG/ML
10 INJECTION INTRAMUSCULAR; INTRAVENOUS ONCE AS NEEDED
Status: DISCONTINUED | OUTPATIENT
Start: 2021-02-25 | End: 2021-02-25

## 2021-02-25 RX ORDER — GENTAMICIN SULFATE 40 MG/ML
INJECTION, SOLUTION INTRAMUSCULAR; INTRAVENOUS AS NEEDED
Status: DISCONTINUED | OUTPATIENT
Start: 2021-02-25 | End: 2021-02-25

## 2021-02-25 RX ORDER — LIDOCAINE HYDROCHLORIDE 10 MG/ML
INJECTION, SOLUTION EPIDURAL; INFILTRATION; INTRACAUDAL; PERINEURAL AS NEEDED
Status: DISCONTINUED | OUTPATIENT
Start: 2021-02-25 | End: 2021-02-25

## 2021-02-25 RX ORDER — SODIUM CHLORIDE 9 MG/ML
INJECTION, SOLUTION INTRAVENOUS CONTINUOUS PRN
Status: DISCONTINUED | OUTPATIENT
Start: 2021-02-25 | End: 2021-02-25

## 2021-02-25 RX ORDER — ONDANSETRON 2 MG/ML
4 INJECTION INTRAMUSCULAR; INTRAVENOUS EVERY 6 HOURS PRN
Status: DISCONTINUED | OUTPATIENT
Start: 2021-02-25 | End: 2021-02-28 | Stop reason: HOSPADM

## 2021-02-25 RX ORDER — CEFAZOLIN SODIUM 2 G/50ML
2000 SOLUTION INTRAVENOUS EVERY 8 HOURS
Status: DISCONTINUED | OUTPATIENT
Start: 2021-02-26 | End: 2021-02-27

## 2021-02-25 RX ORDER — MORPHINE SULFATE 4 MG/ML
4 INJECTION, SOLUTION INTRAMUSCULAR; INTRAVENOUS ONCE
Status: COMPLETED | OUTPATIENT
Start: 2021-02-25 | End: 2021-02-25

## 2021-02-25 RX ORDER — HEPARIN SODIUM 5000 [USP'U]/ML
5000 INJECTION, SOLUTION INTRAVENOUS; SUBCUTANEOUS EVERY 8 HOURS SCHEDULED
Status: DISCONTINUED | OUTPATIENT
Start: 2021-02-25 | End: 2021-02-28 | Stop reason: HOSPADM

## 2021-02-25 RX ORDER — ACETAMINOPHEN 325 MG/1
650 TABLET ORAL EVERY 6 HOURS PRN
Status: DISCONTINUED | OUTPATIENT
Start: 2021-02-25 | End: 2021-02-28 | Stop reason: HOSPADM

## 2021-02-25 RX ORDER — CIPROFLOXACIN 2 MG/ML
400 INJECTION, SOLUTION INTRAVENOUS ONCE
Status: COMPLETED | OUTPATIENT
Start: 2021-02-25 | End: 2021-02-25

## 2021-02-25 RX ORDER — DEXTROSE, SODIUM CHLORIDE, AND POTASSIUM CHLORIDE 5; .9; .15 G/100ML; G/100ML; G/100ML
125 INJECTION INTRAVENOUS CONTINUOUS
Status: DISCONTINUED | OUTPATIENT
Start: 2021-02-25 | End: 2021-02-25

## 2021-02-25 RX ORDER — PROPOFOL 10 MG/ML
INJECTION, EMULSION INTRAVENOUS AS NEEDED
Status: DISCONTINUED | OUTPATIENT
Start: 2021-02-25 | End: 2021-02-25

## 2021-02-25 RX ORDER — MAGNESIUM HYDROXIDE 1200 MG/15ML
LIQUID ORAL AS NEEDED
Status: DISCONTINUED | OUTPATIENT
Start: 2021-02-25 | End: 2021-02-25 | Stop reason: HOSPADM

## 2021-02-25 RX ORDER — DIPHENHYDRAMINE HYDROCHLORIDE 50 MG/ML
12.5 INJECTION INTRAMUSCULAR; INTRAVENOUS ONCE AS NEEDED
Status: DISCONTINUED | OUTPATIENT
Start: 2021-02-25 | End: 2021-02-25

## 2021-02-25 RX ORDER — SODIUM CHLORIDE 9 MG/ML
INJECTION, SOLUTION INTRAVENOUS AS NEEDED
Status: DISCONTINUED | OUTPATIENT
Start: 2021-02-25 | End: 2021-02-25 | Stop reason: HOSPADM

## 2021-02-25 RX ORDER — GABAPENTIN 300 MG/1
600 CAPSULE ORAL 2 TIMES DAILY
Status: DISCONTINUED | OUTPATIENT
Start: 2021-02-26 | End: 2021-02-28 | Stop reason: HOSPADM

## 2021-02-25 RX ORDER — ALBUMIN, HUMAN INJ 5% 5 %
SOLUTION INTRAVENOUS CONTINUOUS PRN
Status: DISCONTINUED | OUTPATIENT
Start: 2021-02-25 | End: 2021-02-25

## 2021-02-25 RX ORDER — ALBUTEROL SULFATE 90 UG/1
2 AEROSOL, METERED RESPIRATORY (INHALATION) 3 TIMES DAILY PRN
Status: DISCONTINUED | OUTPATIENT
Start: 2021-02-25 | End: 2021-02-28 | Stop reason: HOSPADM

## 2021-02-25 RX ORDER — FLUTICASONE PROPIONATE 220 UG/1
1 AEROSOL, METERED RESPIRATORY (INHALATION) DAILY
Status: DISCONTINUED | OUTPATIENT
Start: 2021-02-26 | End: 2021-02-28 | Stop reason: HOSPADM

## 2021-02-25 RX ORDER — ACETAMINOPHEN 325 MG/1
650 TABLET ORAL ONCE
Status: COMPLETED | OUTPATIENT
Start: 2021-02-25 | End: 2021-02-25

## 2021-02-25 RX ORDER — ONDANSETRON 2 MG/ML
4 INJECTION INTRAMUSCULAR; INTRAVENOUS ONCE
Status: COMPLETED | OUTPATIENT
Start: 2021-02-25 | End: 2021-02-25

## 2021-02-25 RX ORDER — PANTOPRAZOLE SODIUM 20 MG/1
20 TABLET, DELAYED RELEASE ORAL
Status: DISCONTINUED | OUTPATIENT
Start: 2021-02-26 | End: 2021-02-28 | Stop reason: HOSPADM

## 2021-02-25 RX ORDER — LEVOTHYROXINE SODIUM 0.03 MG/1
25 TABLET ORAL
Status: DISCONTINUED | OUTPATIENT
Start: 2021-02-26 | End: 2021-02-28 | Stop reason: HOSPADM

## 2021-02-25 RX ORDER — LIDOCAINE HYDROCHLORIDE 10 MG/ML
0.5 INJECTION, SOLUTION EPIDURAL; INFILTRATION; INTRACAUDAL; PERINEURAL ONCE AS NEEDED
Status: DISCONTINUED | OUTPATIENT
Start: 2021-02-25 | End: 2021-02-25

## 2021-02-25 RX ADMIN — PHENYLEPHRINE HYDROCHLORIDE 200 MCG: 10 INJECTION INTRAVENOUS at 22:13

## 2021-02-25 RX ADMIN — ONDANSETRON 4 MG: 2 INJECTION INTRAMUSCULAR; INTRAVENOUS at 18:55

## 2021-02-25 RX ADMIN — MIDAZOLAM 2 MG: 1 INJECTION INTRAMUSCULAR; INTRAVENOUS at 21:55

## 2021-02-25 RX ADMIN — PHENYLEPHRINE HYDROCHLORIDE 200 MCG: 10 INJECTION INTRAVENOUS at 22:04

## 2021-02-25 RX ADMIN — MORPHINE SULFATE 4 MG: 4 INJECTION INTRAVENOUS at 18:55

## 2021-02-25 RX ADMIN — SODIUM CHLORIDE 1000 ML: 0.9 INJECTION, SOLUTION INTRAVENOUS at 18:54

## 2021-02-25 RX ADMIN — GENTAMICIN SULFATE 160 MG: 40 INJECTION, SOLUTION INTRAMUSCULAR; INTRAVENOUS at 22:18

## 2021-02-25 RX ADMIN — SODIUM CHLORIDE: 0.9 INJECTION, SOLUTION INTRAVENOUS at 21:54

## 2021-02-25 RX ADMIN — CEFAZOLIN SODIUM 2000 MG: 2 SOLUTION INTRAVENOUS at 23:59

## 2021-02-25 RX ADMIN — PROPOFOL 200 MG: 10 INJECTION, EMULSION INTRAVENOUS at 21:57

## 2021-02-25 RX ADMIN — ALBUMIN (HUMAN): 12.5 INJECTION, SOLUTION INTRAVENOUS at 22:16

## 2021-02-25 RX ADMIN — FENTANYL CITRATE 100 MCG: 50 INJECTION INTRAMUSCULAR; INTRAVENOUS at 21:57

## 2021-02-25 RX ADMIN — LIDOCAINE HYDROCHLORIDE 100 MG: 10 INJECTION, SOLUTION EPIDURAL; INFILTRATION; INTRACAUDAL; PERINEURAL at 21:57

## 2021-02-25 RX ADMIN — CIPROFLOXACIN 400 MG: 2 INJECTION, SOLUTION INTRAVENOUS at 19:30

## 2021-02-25 RX ADMIN — ACETAMINOPHEN 650 MG: 325 TABLET ORAL at 18:55

## 2021-02-25 RX ADMIN — HEPARIN SODIUM 5000 UNITS: 5000 INJECTION INTRAVENOUS; SUBCUTANEOUS at 23:59

## 2021-02-25 RX ADMIN — KETAMINE HYDROCHLORIDE 50 MG: 50 INJECTION, SOLUTION INTRAMUSCULAR; INTRAVENOUS at 21:57

## 2021-02-25 RX ADMIN — DEXAMETHASONE SODIUM PHOSPHATE 10 MG: 10 INJECTION, SOLUTION INTRAMUSCULAR; INTRAVENOUS at 22:09

## 2021-02-25 RX ADMIN — ONDANSETRON 4 MG: 2 INJECTION INTRAMUSCULAR; INTRAVENOUS at 22:09

## 2021-02-25 RX ADMIN — SODIUM CHLORIDE 100 ML/HR: 0.9 INJECTION, SOLUTION INTRAVENOUS at 23:00

## 2021-02-25 NOTE — ED PROVIDER NOTES
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 and no apppendix     Patient is a 22-year-old female presenting to the emergency room complaining of 3 day history of right flank and right upper quadrant abdominal pain, she has associated nausea and fever, denies vomiting, no diarrhea, has a history of kidney stones but states pain is different today than typical kidney stone pain she also has a history of cholecystectomy, appendectomy, right oophorectomy, patient denies any blood in her stool or urine, denies dysuria, no vaginal discharge or irregular bleeding          Prior to Admission Medications   Prescriptions Last Dose Informant Patient Reported?  Taking?   acetaminophen (TYLENOL) 325 mg tablet   No No   Sig: Take 2 tablets (650 mg total) by mouth every 6 (six) hours as needed for mild pain, headaches or fever   albuterol (PROVENTIL HFA,VENTOLIN HFA) 90 mcg/act inhaler   Yes No   Sig: Inhale 2 puffs 3 (three) times a day as needed   fluticasone (ARNUITY ELLIPTA) 200 MCG/ACT AEPB inhaler   Yes No   Sig: Inhale 1 puff daily   gabapentin (NEURONTIN) 600 MG tablet  Self Yes No   Sig: Take 600 mg by mouth 2 (two) times a day   levothyroxine 25 mcg tablet   Yes No   Sig: Take 25 mcg by mouth daily   montelukast (SINGULAIR) 10 mg tablet   Yes No   Sig: Take 10 mg by mouth daily   pantoprazole (PROTONIX) 20 mg tablet  Self Yes No   Sig: Take 20 mg by mouth 2 (two) times a day   tamsulosin (FLOMAX) 0 4 mg   No No   Sig: Take 1 capsule (0 4 mg total) by mouth daily with dinner   topiramate (TOPAMAX) 50 MG tablet   Yes No   Sig: Take 50 mg by mouth every 12 (twelve) hours      Facility-Administered Medications: None       Past Medical History:   Diagnosis Date    Arthritis     Asthma     COPD (chronic obstructive pulmonary disease) (HCC)     Fibromyalgia     Kidney stones        Past Surgical History:   Procedure Laterality Date    APPENDECTOMY      JOINT REPLACEMENT      bilateral knee   LIPOMA RESECTION         Family History   Problem Relation Age of Onset    Hypertension Father      I have reviewed and agree with the history as documented  E-Cigarette/Vaping    E-Cigarette Use Never User      E-Cigarette/Vaping Substances     Social History     Tobacco Use    Smoking status: Current Every Day Smoker     Packs/day: 0 50    Smokeless tobacco: Never Used   Substance Use Topics    Alcohol use: Never     Frequency: Never    Drug use: Never       Review of Systems   Constitutional: Positive for chills and fever  HENT: Negative  Eyes: Negative  Respiratory: Negative  Cardiovascular: Negative  Gastrointestinal: Positive for abdominal pain and nausea  Endocrine: Negative  Genitourinary: Positive for flank pain  Skin: Negative  Allergic/Immunologic: Negative  Neurological: Negative  Hematological: Negative  Psychiatric/Behavioral: Negative  Physical Exam  Physical Exam  Constitutional:       Appearance: She is well-developed  She is obese  She is ill-appearing  HENT:      Head: Normocephalic and atraumatic  Eyes:      Pupils: Pupils are equal, round, and reactive to light  Neck:      Musculoskeletal: Normal range of motion and neck supple  Cardiovascular:      Rate and Rhythm: Regular rhythm  Tachycardia present  Pulmonary:      Effort: Pulmonary effort is normal  Tachypnea present  Breath sounds: Normal breath sounds  Abdominal:      General: Bowel sounds are normal       Palpations: Abdomen is soft  Tenderness: There is abdominal tenderness in the right upper quadrant  Musculoskeletal: Normal range of motion  Skin:     General: Skin is warm  Neurological:      Mental Status: She is alert           Vital Signs  ED Triage Vitals   Temperature Pulse Respirations Blood Pressure SpO2   02/25/21 1808 02/25/21 1808 02/25/21 1808 02/25/21 1808 02/25/21 1808   (!) 102 9 °F (39 4 °C) (!) 111 (!) 24 (!) 154/104 95 %      Temp Source Heart Rate Source Patient Position - Orthostatic VS BP Location FiO2 (%)   02/25/21 1808 02/25/21 1808 02/25/21 1808 02/25/21 1808 --   Temporal Monitor Lying Left arm       Pain Score       02/25/21 1855       Worst Possible Pain           Vitals:    02/25/21 1915 02/25/21 1930 02/25/21 2000 02/25/21 2030   BP: 120/84 125/72 117/56 107/62   Pulse: (!) 106 95 90 86   Patient Position - Orthostatic VS:                   ED Medications  Medications   sodium chloride 0 9 % bolus 1,000 mL (1,000 mL Intravenous New Bag 2/25/21 1854)     Followed by   sodium chloride 0 9 % bolus 1,000 mL (has no administration in time range)   morphine (PF) 4 mg/mL injection 4 mg (4 mg Intravenous Given 2/25/21 1855)   ondansetron (ZOFRAN) injection 4 mg (4 mg Intravenous Given 2/25/21 1855)   acetaminophen (TYLENOL) tablet 650 mg (650 mg Oral Given 2/25/21 1855)   ciprofloxacin (CIPRO) IVPB (premix in 5% dextrose) 400 mg 200 mL (0 mg Intravenous Stopped 2/25/21 2037)       Diagnostic Studies  Results Reviewed     Procedure Component Value Units Date/Time    COVID19, Influenza A/B, RSV PCR, [facility] [[id number]]  (Normal) Collected: 02/25/21 1913    Lab Status: Final result Specimen: Nares from Nasopharyngeal Swab Updated: 02/25/21 2014     SARS-CoV-2 Negative     INFLUENZA A PCR Negative     INFLUENZA B PCR Negative     RSV PCR Negative    Narrative: This test has been authorized by FDA under an EUA (Emergency Use Assay) for use by authorized laboratories  Clinical caution and judgement should be used with the interpretation of these results with consideration of the clinical impression and other laboratory testing  Testing reported as "Positive" or "Negative" has been proven to be accurate according to standard laboratory validation requirements    All testing is performed with control materials showing appropriate reactivity at standard intervals  Lactic acid [[id number]]  (Normal) Collected: 02/25/21 1845    Lab Status: Final result Specimen: Blood from Arm, Right Updated: 02/25/21 1920     LACTIC ACID 1 0 mmol/L     Narrative:      Result may be elevated if tourniquet was used during collection  Urine Microscopic [[id number]]  (Abnormal) Collected: 02/25/21 1859    Lab Status: Final result Specimen: Urine, Clean Catch Updated: 02/25/21 1920     RBC, UA None Seen /hpf      WBC, UA 10-20 /hpf      Epithelial Cells Occasional /hpf      Bacteria, UA Moderate /hpf     Urine culture [[id number]] Collected: 02/25/21 1859    Lab Status: In process Specimen: Urine, Clean Catch Updated: 02/25/21 1920    Protime-INR [[id number]]  (Normal) Collected: 02/25/21 1845    Lab Status: Final result Specimen: Blood from Arm, Right Updated: 02/25/21 1912     Protime 12 7 seconds      INR 0 97    APTT [[id number]]  (Normal) Collected: 02/25/21 1845    Lab Status: Final result Specimen: Blood from Arm, Right Updated: 02/25/21 1912     PTT 29 seconds     UA w Reflex to Microscopic w Reflex to Culture [[id number]]  (Abnormal) Collected: 02/25/21 1859    Lab Status: Final result Specimen: Urine, Clean Catch Updated: 02/25/21 1909     Color, UA Yellow     Clarity, UA Slightly Cloudy     Specific Girard, UA 1 010     pH, UA 7 5     Leukocytes, UA Moderate     Nitrite, UA Negative     Protein, UA Negative mg/dl      Glucose, UA Negative mg/dl      Ketones, UA Negative mg/dl      Urobilinogen, UA 0 2 E U /dl      Bilirubin, UA Negative     Blood, UA Small    Magnesium [[id number]]  (Normal) Collected: 02/25/21 1857    Lab Status: Final result Specimen: Blood Updated: 02/25/21 1904     Magnesium 1 7 mg/dL     Procalcitonin with AM Reflex [[id number]] Collected: 02/25/21 1857    Lab Status: In process Specimen: Blood Updated: 02/25/21 1857    Blood culture #1 [[id number]] Collected: 02/25/21 1845    Lab Status:  In process Specimen: Blood from Arm, Right Updated: 02/25/21 1856    Blood culture #2 [[id number]] Collected: 02/25/21 1850    Lab Status:  In process Specimen: Blood from Arm, Left Updated: 02/25/21 1856    Comprehensive metabolic panel [[id number]]  (Abnormal) Collected: 02/25/21 1837    Lab Status: Final result Specimen: Blood Updated: 02/25/21 1855     Sodium 133 mmol/L      Potassium 4 5 mmol/L      Chloride 99 mmol/L      CO2 24 mmol/L      ANION GAP 10 mmol/L      BUN 12 mg/dL      Creatinine 1 48 mg/dL      Glucose 105 mg/dL      Calcium 8 8 mg/dL      Corrected Calcium 9 4 mg/dL      AST 30 U/L      ALT 28 U/L      Alkaline Phosphatase 117 U/L      Total Protein 7 7 g/dL      Albumin 3 2 g/dL      Total Bilirubin 0 92 mg/dL      eGFR 40 ml/min/1 73sq m     Narrative:      [place] guidelines for Chronic Kidney Disease (CKD):     Stage 1 with normal or high GFR (GFR > 90 mL/min/1 73 square meters)    Stage 2 Mild CKD (GFR = 60-89 mL/min/1 73 square meters)    Stage 3A Moderate CKD (GFR = 45-59 mL/min/1 73 square meters)    Stage 3B Moderate CKD (GFR = 30-44 mL/min/1 73 square meters)    Stage 4 Severe CKD (GFR = 15-29 mL/min/1 73 square meters)    Stage 5 End Stage CKD (GFR <15 mL/min/1 73 square meters)  Note: GFR calculation is accurate only with a steady state creatinine    Lipase [[id number]]  (Abnormal) Collected: 02/25/21 1837    Lab Status: Final result Specimen: Blood Updated: 02/25/21 1855     Lipase 51 u/L     CBC and differential [[id number]]  (Abnormal) Collected: 02/25/21 1837    Lab Status: Final result Specimen: Blood Updated: 02/25/21 1840     WBC 17 68 Thousand/uL      RBC 5 41 Million/uL      Hemoglobin 15 6 g/dL      Hematocrit 46 9 %      MCV 87 fL      MCH 28 8 pg      MCHC 33 3 g/dL      RDW 12 4 %      MPV 12 0 fL      Platelets 783 Thousands/uL      nRBC 0 /100 WBCs      Neutrophils Relative 76 %      Immat GRANS % 1 %      Lymphocytes Relative 14 %      Monocytes Relative 7 %      Eosinophils Relative 1 % Basophils Relative 1 %      Neutrophils Absolute 13 50 Thousands/µL      Immature Grans Absolute 0 11 Thousand/uL      Lymphocytes Absolute 2 45 Thousands/µL      Monocytes Absolute 1 31 Thousand/µL      Eosinophils Absolute 0 23 Thousand/µL      Basophils Absolute 0 08 Thousands/µL                  CT renal stone study abdomen pelvis without contrast   Final Result by  (02/25 2052)   Addendum 1 of 1 by Gauri Ha MD (02/25 2012)   ADDENDUM:      Typographical error in the findings corrected as follows:      OBSTRUCTING 10 x 10 x 9 mm calculus in the proximal right ureter at the    L3-4 level  Technical error in the impression corrected as follows: Obstructing 10 mm calculus in the proximal right ureter at the L3-4 level  Distal ureteral obstructing 4 mm calculus at the level of the acetabular    roof           Final      XR chest portable    (Results Pending)              Procedures  CriticalCare Time  Performed by: Isai Mckinnon DO  Authorized by: Isai Mckinnon DO     Critical care provider statement:     Critical care time (minutes):  30    Critical care time was exclusive of:  Separately billable procedures and treating other patients and teaching time    Critical care was necessary to treat or prevent imminent or life-threatening deterioration of the following conditions:  Sepsis and renal failure    Critical care was time spent personally by me on the following activities:  Obtaining history from patient or surrogate, development of treatment plan with patient or surrogate, discussions with consultants, discussions with primary provider, evaluation of patient's response to treatment, examination of patient, ordering and performing treatments and interventions, ordering and review of laboratory studies, ordering and review of radiographic studies, re-evaluation of patient's condition and review of old charts    I assumed direction of critical care for this patient from another provider in my specialty: no               ED Course  ED Course as of Feb 25 2052   u Feb 25, 2021 2012 Patient discussed with on-call urology EARNESTINE Hickey, reviewed patient's CT scan findings and labs, as well as vital signs, requested she be transferred to [address] as a priority one transfer for immediate placement of ureteral stent in the OR      [id number] crew in the emergency department to transport patient to [facility] for further evaluation and treatment, vital signs significantly improved, patient mentating well, stable for transfer                            Initial Sepsis Screening     Row Name 02/25/21 1827                Is the patient's history suggestive of a new or worsening infection? (!) Yes (Proceed)  -RAZIA        Suspected source of infection  acute abdominal infection;urinary tract infection  -RAZIA        Are two or more of the following signs & symptoms of infection both present and new to the patient? (!) Yes (Proceed)  -RAZIA        Indicate SIRS criteria  Hyperthemia > 38 3C (100 9F); Tachycardia > 90 bpm;Tachypnea > 20 resp per min  -RAZIA        If the answer is yes to both questions, suspicion of sepsis is present  --        If severe sepsis is present AND tissue hypoperfusion perists in the hour after fluid resuscitation or lactate > 4, the patient meets criteria for SEPTIC SHOCK  --        Are any of the following organ dysfunction criteria present within 6 hours of suspected infection and SIRS criteria that are NOT considered to be chronic conditions? --        Organ dysfunction  --        Date of presentation of severe sepsis  02/25/21  -RAZIA        Time of presentation of severe sepsis  1829  -RAZIA        Tissue hypoperfusion persists in the hour after crystalloid fluid administration, evidenced, by either:  --        Was hypotension present within one hour of the conclusion of crystalloid fluid administration?   No  -RAZIA        Date of presentation of septic shock  --        Time of presentation of septic shock  --          User Key  (r) = Recorded By, (t) = Taken By, (c) = Cosigned By    Initials Name Provider Type    RAZIA Watts DO Physician           Default Flowsheet Data (last 720 hours)      Sepsis Reassess     Row Name 02/25/21 2017                   Repeat Volume Status and Tissue Perfusion Assessment Performed    Repeat Volume Status and Tissue Perfusion Assessment Performed  Yes  -RAZIA           Volume Status and Tissue Perfusion Post Fluid Resuscitation * Must Document All *    Vital Signs Reviewed (HR, RR, BP, T)  Yes Heart rate 90, respiratory rate 20, /56, temperature 99 7°  -RAZIA        Shock Index Reviewed  Yes  -RAZIA        Arterial Oxygen Saturation Reviewed (POx, SaO2 or SpO2)  Yes (comment %)  -RAZIA        Cardio  Normal S1/S2; Regular rate and rhythm; No murmor; No rub or gallop  -RAZIA        Pulmonary  Normal effort  -RAZIA        Capillary Refill  Brisk  -RAZIA        Peripheral Pulses  Radial  -RAZIA        Peripheral Pulse  +2  -RAZIA        Skin  Warm  -RAZIA        Urine output assessed  Adequate  -RAZIA           *OR*   Intensive Monitoring- Must Document One of the Following Four *:    Vital Signs Reviewed  Yes  -RAZIA        * Central Venous Pressure (CVP or RAP)  --        * Central Venous Oxygen (SVO2, ScvO2 or Oxygen saturation via central catheter)  --        * Bedside Cardiovascular US in IVC diameter and % collapse  --        * Passive Leg Raise OR Crystalloid Challenge  Crystalloid fluid challenge completed  -RAZIA        Crystalloid fluid challenge completed  1000mL in 30 minutes  -RAZIA          User Key  (r) = Recorded By, (t) = Taken By, (c) = Cosigned By    Initials Name Provider Type    RAZIA Watts DO Physician                        Disposition  Final diagnoses:   Hydronephrosis of right kidney   Ureteral calculi   Sepsis ([facility] [address] )   Obstructive uropathy   YONY (acute kidney injury) ([facility] [address] )     Time reflects when diagnosis was documented in both MDM as applicable and the Disposition within this note     Time User Action Codes Description Comment    2/25/2021  8:08 PM Wall Anjelica Add [N13 30] Hydronephrosis of right kidney     2/25/2021  8:08 PM Wall Anjelica Add [N20 1] Ureteral calculi     2/25/2021  8:19 PM Polanczyk, Larry Diane Add [A41 9] Sepsis ([address] )     2/25/2021  8:19 PM Polanczyk, Larry Diane Add [N13 9] Obstructive uropathy     2/25/2021  8:19 PM Polanczyk, Larry Diane Add [N17 9] YONY (acute kidney injury) [facility])       ED Disposition     ED Disposition Condition Date/Time Comment    Transfer to Another Facility-In Network  Thu Feb 25, 2021  8:19 PM Shweta Hamilton should be transferred out to [place], [place]         MD Documentation      Most Recent Value   Patient Condition  The patient has been stabilized such that within reasonable medical probability, no material deterioration of the patient condition or the condition of the unborn child(antonia) is likely to result from the transfer   Reason for Transfer  Level of Care needed not available at this facility   Benefits of Transfer  Specialized equipment and/or services available at the receiving facility (Include comment)________________________   Risks of Transfer  Potential for delay in receiving treatment, Potential deterioration of medical condition, Increased discomfort during transfer   Accepting Physician  Dr Victorino Meyer Name, [address]   B     (Name & Tel number)  Chet Cuello   Provider Certification  General risk, such as traffic hazards, adverse weather conditions, rough terrain or turbulence, possible failure of equipment (including vehicle or aircraft), or consequences of actions of persons outside the control of the transport personnel, Unanticipated needs of medical equipment and personnel during transport, Risk of worsening condition, The possibility of a transport vehicle being unavailable      RN Documentation Most Recent Value   Accepting Facility Name, [facility]ignment  OR    (Name & Tel number)  Ana Nguyen   Report Given to  [facility] RN      Follow-up Information    None         Patient's Medications   Discharge Prescriptions    No medications on file     No discharge procedures on file      PDMP Review     None          ED Provider  Electronically Signed by           Ruma Pfeiffer DO  02/25/21 2052

## 2021-02-26 ENCOUNTER — TELEPHONE (OUTPATIENT)
Dept: OTHER | Facility: HOSPITAL | Age: 51
End: 2021-02-26

## 2021-02-26 PROBLEM — Q04.3: Status: ACTIVE | Noted: 2021-02-26

## 2021-02-26 PROBLEM — N17.9 AKI (ACUTE KIDNEY INJURY) (HCC): Status: ACTIVE | Noted: 2021-02-26

## 2021-02-26 PROBLEM — Q87.89: Status: ACTIVE | Noted: 2021-02-26

## 2021-02-26 LAB
ANION GAP SERPL CALCULATED.3IONS-SCNC: 7 MMOL/L (ref 4–13)
BUN SERPL-MCNC: 17 MG/DL (ref 5–25)
CALCIUM SERPL-MCNC: 8.9 MG/DL (ref 8.3–10.1)
CHLORIDE SERPL-SCNC: 110 MMOL/L (ref 100–108)
CO2 SERPL-SCNC: 25 MMOL/L (ref 21–32)
CREAT SERPL-MCNC: 1.54 MG/DL (ref 0.6–1.3)
ERYTHROCYTE [DISTWIDTH] IN BLOOD BY AUTOMATED COUNT: 12.7 % (ref 11.6–15.1)
GFR SERPL CREATININE-BSD FRML MDRD: 39 ML/MIN/1.73SQ M
GLUCOSE SERPL-MCNC: 114 MG/DL (ref 65–140)
HCT VFR BLD AUTO: 46.8 % (ref 34.8–46.1)
HGB BLD-MCNC: 15.3 G/DL (ref 11.5–15.4)
MCH RBC QN AUTO: 29.1 PG (ref 26.8–34.3)
MCHC RBC AUTO-ENTMCNC: 32.7 G/DL (ref 31.4–37.4)
MCV RBC AUTO: 89 FL (ref 82–98)
PLATELET # BLD AUTO: 252 THOUSANDS/UL (ref 149–390)
PMV BLD AUTO: 12.7 FL (ref 8.9–12.7)
POTASSIUM SERPL-SCNC: 4 MMOL/L (ref 3.5–5.3)
PROCALCITONIN SERPL-MCNC: 0.13 NG/ML
RBC # BLD AUTO: 5.26 MILLION/UL (ref 3.81–5.12)
SODIUM SERPL-SCNC: 142 MMOL/L (ref 136–145)
WBC # BLD AUTO: 13.32 THOUSAND/UL (ref 4.31–10.16)

## 2021-02-26 PROCEDURE — 99232 SBSQ HOSP IP/OBS MODERATE 35: CPT | Performed by: PHYSICIAN ASSISTANT

## 2021-02-26 PROCEDURE — 85027 COMPLETE CBC AUTOMATED: CPT | Performed by: INTERNAL MEDICINE

## 2021-02-26 PROCEDURE — 80048 BASIC METABOLIC PNL TOTAL CA: CPT | Performed by: INTERNAL MEDICINE

## 2021-02-26 RX ADMIN — CEFAZOLIN SODIUM 2000 MG: 2 SOLUTION INTRAVENOUS at 15:51

## 2021-02-26 RX ADMIN — OXYCODONE HYDROCHLORIDE 5 MG: 5 TABLET ORAL at 15:54

## 2021-02-26 RX ADMIN — SODIUM CHLORIDE 100 ML/HR: 0.9 INJECTION, SOLUTION INTRAVENOUS at 08:43

## 2021-02-26 RX ADMIN — TOPIRAMATE 50 MG: 25 TABLET ORAL at 21:31

## 2021-02-26 RX ADMIN — ACETAMINOPHEN 650 MG: 325 TABLET, FILM COATED ORAL at 21:30

## 2021-02-26 RX ADMIN — SODIUM CHLORIDE 100 ML/HR: 0.9 INJECTION, SOLUTION INTRAVENOUS at 21:35

## 2021-02-26 RX ADMIN — TOPIRAMATE 50 MG: 25 TABLET ORAL at 11:40

## 2021-02-26 RX ADMIN — MONTELUKAST SODIUM 10 MG: 10 TABLET, FILM COATED ORAL at 08:42

## 2021-02-26 RX ADMIN — ACETAMINOPHEN 650 MG: 325 TABLET, FILM COATED ORAL at 01:30

## 2021-02-26 RX ADMIN — HEPARIN SODIUM 5000 UNITS: 5000 INJECTION INTRAVENOUS; SUBCUTANEOUS at 06:02

## 2021-02-26 RX ADMIN — TAMSULOSIN HYDROCHLORIDE 0.4 MG: 0.4 CAPSULE ORAL at 15:50

## 2021-02-26 RX ADMIN — HEPARIN SODIUM 5000 UNITS: 5000 INJECTION INTRAVENOUS; SUBCUTANEOUS at 21:30

## 2021-02-26 RX ADMIN — HEPARIN SODIUM 5000 UNITS: 5000 INJECTION INTRAVENOUS; SUBCUTANEOUS at 13:28

## 2021-02-26 RX ADMIN — CEFAZOLIN SODIUM 2000 MG: 2 SOLUTION INTRAVENOUS at 08:42

## 2021-02-26 RX ADMIN — PANTOPRAZOLE SODIUM 20 MG: 20 TABLET, DELAYED RELEASE ORAL at 15:50

## 2021-02-26 RX ADMIN — GABAPENTIN 600 MG: 300 CAPSULE ORAL at 08:42

## 2021-02-26 RX ADMIN — GABAPENTIN 600 MG: 300 CAPSULE ORAL at 17:26

## 2021-02-26 RX ADMIN — ACETAMINOPHEN 650 MG: 325 TABLET, FILM COATED ORAL at 08:42

## 2021-02-26 RX ADMIN — FLUTICASONE PROPIONATE 1 PUFF: 220 AEROSOL, METERED RESPIRATORY (INHALATION) at 13:28

## 2021-02-26 RX ADMIN — PANTOPRAZOLE SODIUM 20 MG: 20 TABLET, DELAYED RELEASE ORAL at 06:02

## 2021-02-26 NOTE — UTILIZATION REVIEW
Notification of Inpatient Admission/Inpatient Authorization Request   This is a Notification of Inpatient Admission for [address]  Be advised that this patient was admitted to our facility under Inpatient Status  Contact Jim Roblero at [phone] for additional admission information  [facility]  DEDICATED [phone]  Patient Name:   Josh Rowe   YOB: 1970       [address]   [address]:   [facility]  Tax ID: [id number]  NPI: [id number] Attending Provider/NPI:  Phone:  Address: Debbie Farias [[id number]]  [phone]  Same as Facility   Place of Service Code: 24 Place of Service Name:  [address] [place] [address]   Start Date: 2/25/21 2132 Discharge Date & Time: No discharge date for patient encounter  Type of Admission: Inpatient Status Discharge Disposition (if discharged): [address] [place] [address]   Patient Diagnoses: Hydronephrosis of right kidney [N13 30]  Ureteral calculi [N20 1]     Orders: Admission Orders (From admission, onward)     Ordered        02/25/21 2256  INPATIENT ADMISSION  Once                    Assigned Utilization Review Contact: Jim Roblero  Utilization ,   Network Utilization Review Department  Phone: [phone]; Fax [phone]   Email: [email or web address]   ATTENTION PAYERS: Please call the assigned Utilization  directly with any questions or concerns ALL voicemails in the department are confidential  Send all requests for admission clinical reviews, approved or denied determinations and any other requests to dedicated fax number belonging to the campus where the patient is receiving treatment

## 2021-02-26 NOTE — ASSESSMENT & PLAN NOTE
POA, presented with fever and leukocytosis  Likely secondary to above  Patient has allergy to beta lactams - reports rash  Tolerating IV Ancef   F/u urine culture

## 2021-02-26 NOTE — UTILIZATION REVIEW
Initial Clinical Review    Admission: Date/Time/Statement:   Admission Orders (From admission, onward)     Ordered        02/25/21 2256  INPATIENT ADMISSION  Once        TRANSFER FROM [facility] TO [facility] 81  LEVEL OF CARE            Orders Placed This Encounter   Procedures    INPATIENT ADMISSION     Standing Status:   Standing     Number of Occurrences:   1     Order Specific Question:   Level of Care     Answer:   Med Surg [16]     Order Specific Question:   Estimated length of stay     Answer:   More than 2 Midnights     Order Specific Question:   Certification     Answer:   I certify that inpatient services are medically necessary for this patient for a duration of greater than two midnights  See H&P and MD Progress Notes for additional information about the patient's course of treatment  Assessment/Plan:   Ms Mak Cooper is a 45 yo female who presents as a transfer from [facility] to [facility] for ongoing treatment of R flank pain who is febrile and has leukocytosis  On imaging she was found to have obstructing proximal R ureteral stone with severe hydronephrosis  She will need surgery  She is admitted to INPATIENT status with R ureteral stone - OR for cysto and stent placement  Sepsis - IV antibiotics, urine and blood cultures  Hyponatremia - IV fluids  PMH: Acquired hypothyroidism, CKD stage 3, asthma, chronic pain syndrome       ++++++++++++++++++++++++++++++  2/25 OPERATIVE NOTE    Preop Diagnosis:  Hydronephrosis of right kidney [N13 30]  Ureteral calculi [N20 1]     Post-Op Diagnosis Codes: * Hydronephrosis of right kidney [N13 30]     * Ureteral calculi [N20 1]     Procedure(s) (LRB):  CYSTOSCOPY RETROGRADE PYELOGRAM WITH INSERTION STENT URETERAL (Right)    Anesthesia Type: General    Operative Findings:  Mildly erythematous bladder with normal orifices  Preliminary fluoroscopic images revealed a calcification in the area of the right renal pelvis   This proved to be a stone on right retrograde pyelography  The stone was located right below the right ureteropelvic junction  I did not appreciate any other calcifications or stones  A 6 Romanian stent was placed  The stent was seen to be in good position with coils in the right renal pelvis above the calcification as well as a nice coil in the bladder  Cultures from the bladder and from the right renal pelvis were taken   +++++++++++++++++++++++++++       Pain Score       02/25/21 2132       7          Wt Readings from Last 1 Encounters:   02/25/21 119 kg (263 lb 0 1 oz)     Additional Vital Signs:   02/26/21 0900  --  --  --  --  --  --  --  --  --  None (Room air)   02/26/21 08:23:43  97 4 °F (36 3 °C)Abnormal   61  18  110/75  87  95 %  --  --  --  --   02/25/21 23:38:37  98 2 °F (36 8 °C)  71  18  120/60  80  93 %  --  --  --  --   02/25/21 2315  --  68  13  100/69  --  98 %  28  --  2 L/min  Nasal cannula   02/25/21 2300  98 °F (36 7 °C)  76  20  121/55  --  99 %  28  --  2 L/min  Nasal cannula   02/25/21 2245  --  76  12  107/73  --  99 %  28  --  2 L/min  Nasal cannula   02/25/21 2235  98 4 °F (36 9 °C)  74  14  107/73  --  99 %  --  6 L/min  --  Simple mask     Pertinent Labs/Diagnostic Test Results:     2/25 CXR - no acute disease  2/25 CT renal stone study -   Severe right hydronephrosis  OBSTRUCTING 10 x 10 x 9 mm calculus in the proximal right ureter at the L3-4 level    Obstructing 10 mm calculus in the proximal right ureter at the L3-4 level  Distal ureteral obstructing 4 mm calculus at the level of the acetabular roof      Results from last 7 days   Lab Units 02/25/21  1913   SARS-COV-2  Negative     Results from last 7 days   Lab Units 02/26/21  0444 02/25/21  1837   WBC Thousand/uL 13 32* 17 68*   HEMOGLOBIN g/dL 15 3 15 6*   HEMATOCRIT % 46 8* 46 9*   PLATELETS Thousands/uL 252 253   NEUTROS ABS Thousands/µL  --  13 50*         Results from last 7 days   Lab Units 02/26/21  0444 02/25/21  1857 02/25/21  1837   SODIUM mmol/L 142  --  133*   POTASSIUM mmol/L 4 0  --  4 5   CHLORIDE mmol/L 110*  --  99*   CO2 mmol/L 25  --  24   ANION GAP mmol/L 7  --  10   BUN mg/dL 17  --  12   CREATININE mg/dL 1 54*  --  1 48*   EGFR ml/min/1 73sq m 39  --  40   CALCIUM mg/dL 8 9  --  8 8   MAGNESIUM mg/dL  --  1 7  --      Results from last 7 days   Lab Units 02/25/21  1837   AST U/L 30   ALT U/L 28   ALK PHOS U/L 117*   TOTAL PROTEIN g/dL 7 7   ALBUMIN g/dL 3 2*   TOTAL BILIRUBIN mg/dL 0 92         Results from last 7 days   Lab Units 02/26/21  0444 02/25/21  1837   GLUCOSE RANDOM mg/dL 114 105     Results from last 7 days   Lab Units 02/25/21  1845   PROTIME seconds 12 7   INR  0 97   PTT seconds 29         Results from last 7 days   Lab Units 02/25/21  1857   PROCALCITONIN ng/ml 0 13     Results from last 7 days   Lab Units 02/25/21  1845   LACTIC ACID mmol/L 1 0     Results from last 7 days   Lab Units 02/25/21  1837   LIPASE u/L 51*     Results from last 7 days   Lab Units 02/25/21  1859   CLARITY UA  Slightly Cloudy   COLOR UA  Yellow   SPEC GRAV UA  1 010   PH UA  7 5   GLUCOSE UA mg/dl Negative   KETONES UA mg/dl Negative   BLOOD UA  Small*   PROTEIN UA mg/dl Negative   NITRITE UA  Negative   BILIRUBIN UA  Negative   UROBILINOGEN UA E U /dl 0 2   LEUKOCYTES UA  Moderate*   WBC UA /hpf 10-20*   RBC UA /hpf None Seen   BACTERIA UA /hpf Moderate*   EPITHELIAL CELLS WET PREP /hpf Occasional     Results from last 7 days   Lab Units 02/25/21  1913   INFLUENZA A PCR  Negative   INFLUENZA B PCR  Negative   RSV PCR  Negative       Past Medical History:   Diagnosis Date    Arthritis     Asthma     COPD (chronic obstructive pulmonary disease) (HCC)     Fibromyalgia     Kidney stones      Present on Admission:   Chronic pain syndrome   Mild intermittent asthma without complication    Admitting Diagnosis: Hydronephrosis of right kidney [N13 30]  Ureteral calculi [N20 1]     Age/Sex: 44 y o  female     Admission Orders:    Scheduled Medications:  cefazolin, 2,000 mg, Intravenous, Q8H  fluticasone, 1 puff, Inhalation, Daily  gabapentin, 600 mg, Oral, BID  gentamicin, 1 mg/kg (Ideal), Intravenous, Once  heparin (porcine), 5,000 Units, Subcutaneous, Q8H [address]  levothyroxine, 25 mcg, Oral, Early Morning  montelukast, 10 mg, Oral, Daily  pantoprazole, 20 mg, Oral, BID (AM & Afternoon)  tamsulosin, 0 4 mg, Oral, Daily With Dinner  topiramate, 50 mg, Oral, Q12H [address]      Continuous IV Infusions:  sodium chloride, 100 mL/hr, Intravenous, Continuous      PRN Meds:  acetaminophen, 650 mg, Oral, Q6H PRN - x 2 2/26  albuterol, 2 puff, Inhalation, TID PRN  morphine injection, 2 mg, Intravenous, Q1H PRN  ondansetron, 4 mg, Intravenous, Q6H PRN  oxyCODONE, 5 mg, Oral, Q4H PRN    SCDs  OOB as eugenia   Oxygen at 2L NC   Strain all urine  Urine culture  Regular diet post op    Network Utilization Review Department  ATTENTION: Please call with any questions or concerns to [phone] and carefully listen to the prompts so that you are directed to the right person  All voicemails are confidential   Deondre Best all requests for admission clinical reviews, approved or denied determinations and any other requests to dedicated fax number below belonging to the campus where the patient is receiving treatment   List of dedicated fax numbers for the Facilities:  [address] [place] [address] DENIALS (Administrative/Medical Necessity) [phone]   [address] (Maternity/NICU/Pediatrics) [phone]   [address] [address] [facility] [address] ([place]) [address] [facility] [address] [place] [address] [address] [phone]   [place] [address] [place] [address] [phone]

## 2021-02-26 NOTE — ASSESSMENT & PLAN NOTE
POA, likely in setting of obstructing kidney stone, severe hydronephrosis, UTI/sepsis  S/p stent placement with urology  Monitor with IV fluids   BMP in AM

## 2021-02-26 NOTE — INTERVAL H&P NOTE
H&P reviewed  After examining the patient I find no changes in the patients condition since the H&P had been written  The procedure was reviewed with the patient in detail  The procedure was described  Risks were discussed and consent form signed  Laterality was marked right  There were no vitals filed for this visit

## 2021-02-26 NOTE — TELEPHONE ENCOUNTER
Ankita Ocampo is a 33-year-old female stented at [facility] for obstructing ureteral calculus and UTI  She will require staged ureteroscopy  Please contact patient/caregiver with hospital follow-up appointment date and time within 4 weeks  Thank you

## 2021-02-26 NOTE — CONSULTS
UROLOGY CONSULTATION NOTE     Patient Identifiers: Evon De Leon (MRN [id number])  Service Requesting Consultation: [facility]  Service Providing Consultation:  Urology, Tip Talley MD    Date of Service: 2/25/2021  Consults    Reason for Consultation:  Fever, right ureteral stone    History of Present Illness:     Evon De Leon is a 44 y o  old female with 3 days of right flank pain  The pain has been getting worse  Today she developed a fever and shaking chills  She has been nauseous and not eating  No vomiting  She presented to the emergency room where she was noted to have a fever, elevated white count and a large right ureteral stone with hydronephrosis  Urology was contacted  The patient has now been transferred to [facility] for cystoscopy and placement of a right ureteral stent  ASSESSMENT & PLAN:     1:  Right ureteral stones with hydronephrosis, fever and impending sepsis  2:  Acute kidney injury    Plan:  Treatment options were discussed with the patient  These include percutaneous nephrostomy tube placement versus stent placement  The pros and cons of each were discussed the patient has elected proceed with  cystoscopy and placement of a right ureteral stent  She understands the stone will not be treated and she will require a definitive ureteroscopic procedure in the future once her infection has been well treated  Past Medical, Past Surgical History:     Past Medical History:   Diagnosis Date    Arthritis     Asthma     COPD (chronic obstructive pulmonary disease) ([facility] [address] )     Fibromyalgia     Kidney stones    :    Past Surgical History:   Procedure Laterality Date    APPENDECTOMY      JOINT REPLACEMENT      bilateral knee   LIPOMA RESECTION     :    Medications, Allergies:     Current Facility-Administered Medications   Medication Dose Route Frequency    lidocaine (PF) (XYLOCAINE-MPF) 1 % injection 0 5 mL  0 5 mL Infiltration Once PRN       Allergies:   Allergies Allergen Reactions    Buspirone Palpitations     Other reaction(s): Other (Please comment)  Palpitations      Amoxicillin      Other reaction(s): Rash  rash      Cephalexin Hives    Ketorolac Tromethamine     Latex Hives    Varenicline Rash     Other reaction(s): Heart Races  Heart races      :    Social and Family History:   Social History:   Social History     Tobacco Use    Smoking status: Current Every Day Smoker     Packs/day: 0 50    Smokeless tobacco: Never Used   Substance Use Topics    Alcohol use: Never     Frequency: Never    Drug use: Never     Social History     Tobacco Use   Smoking Status Current Every Day Smoker    Packs/day: 0 50   Smokeless Tobacco Never Used       Family History:  Family History   Problem Relation Age of Onset    Hypertension Father    :     Review of Systems:     General: + Fever, chills, - night sweats  Cardiac: Negative for chest pain  Pulmonary: Negative for shortness of breath  Gastrointestinal: Denies Abdominal pain, no  nausea, vomiting, or diarrhea +right flank pain  Genitourinary: See HPI above  Neurologic: No focal signs   Musculo-skeletal: No complaints    All other systems queried were negative  Physical Exam:     There were no vitals taken for this visit    General appearance: alert, flushed and mild distress  Head: Normocephalic, without obvious abnormality, atraumatic  Neck: supple, symmetrical, trachea midline  Back: Right CVA tenderness  Lungs: Normal respiratory effort  Heart: regular rate and rhythm  Abdomen: Obese, soft nontender  Extremities: extremities normal, warm and well-perfused; no cyanosis, clubbing, or edema  Neurologic: Grossly normal      Labs:     Lab Results   Component Value Date    HGB 15 6 (H) 02/25/2021    HCT 46 9 (H) 02/25/2021    WBC 17 68 (H) 02/25/2021     02/25/2021   ]    Lab Results   Component Value Date    K 4 5 02/25/2021    CL 99 (L) 02/25/2021    CO2 24 02/25/2021    BUN 12 02/25/2021 CREATININE 1 48 (H) 02/25/2021    CALCIUM 8 8 02/25/2021   ]    Imaging:   I personally reviewed the images and report of the following studies, and reviewed them with the patient:          Thank you for allowing me to participate in this patients care  Please do not hesitate to call with any additional questions    Divya Guzman MD

## 2021-02-26 NOTE — ASSESSMENT & PLAN NOTE
Patient presented with right flank pain  Underlying history of nephrolithiasis  Found to have obstructing right ureteral stone with severe hydronephrosis  She was transferred to [facility] for urgent cystoscopy and urology intervention  Currently status post cystoscopy and right ureteral stent placement  Urology is following

## 2021-02-26 NOTE — OP NOTE
OPERATIVE REPORT  PATIENT NAME: Ellis Ureña    :  1970  MRN: [id number]  Pt Location: [facility] CYSTO ROOM 01    SURGERY DATE: 2021    Surgeon(s) and Role:     * Lluvia Coombs MD - Primary    Preop Diagnosis:  Hydronephrosis of right kidney [N13 30]  Ureteral calculi [N20 1]    Post-Op Diagnosis Codes: * Hydronephrosis of right kidney [N13 30]     * Ureteral calculi [N20 1]    Procedure(s) (LRB):  CYSTOSCOPY RETROGRADE PYELOGRAM WITH INSERTION STENT URETERAL (Right)    Specimen(s):  ID Type Source Tests Collected by Time Destination   A : #1 bladder  Urine Urine, Other URINE CULTURE Lluvia Coombs MD 2021    B : #2 after stent inserted  Urine Urine, Other URINE CULTURE Lluvia Coombs MD 2021        Estimated Blood Loss:   Minimal    Drains:  * No LDAs found *    Anesthesia Type:   General    Operative Indications:  Hydronephrosis of right kidney [N13 30]  Ureteral calculi [N20 1]  Fever    Operative Findings:  Mildly erythematous bladder with normal orifices  Preliminary fluoroscopic images revealed a calcification in the area of the right renal pelvis  This proved to be a stone on right retrograde pyelography  The stone was located right below the right ureteropelvic junction  I did not appreciate any other calcifications or stones  A 6 Panamanian stent was placed  The stent was seen to be in good position with coils in the right renal pelvis above the calcification as well as a nice coil in the bladder  Cultures from the bladder and from the right renal pelvis were taken  Complications:   None    Procedure and Technique:  The patient was brought to the operating room properly identified  General anesthesia was administered  She was placed in lithotomy position prepped draped in usual sterile fashion  Compression boots were employed  Intravenous antibiotic was administered by anesthesia  An appropriate time-out was performed      Cystourethroscopy was performed with 25 Frisian cystoscope with findings as above  A urine culture was performed upon entering the bladder  A tiger tail catheter and Omnipaque were utilized perform a right retrograde pyelogram   The calcification seen on preliminary fluoroscopic images was confirmed to be an obstructing stone just below the right ureteropelvic junction  I did not appreciate any other stones or filling defects  A Solo wire was then easily passed up the right collecting system past the stone and into the renal pelvis  A 6 Frisian contour stent was then passed over the guidewire past the stone and into the renal pelvis  The stent was pushed into position with the pusher  With removal of the guidewire nice coils were seen in the renal pelvis  A culture from the right renal pelvis was taken  Contrast was then injected gently to confirm stent placement  The stent was detached from the pusher leaving a nice coil in the bladder  Bladder was drained and cystoscope removed  Patient was awakened from anesthesia  She was taken to the recovery room in satisfactory condition     I was present for the entire procedure    Patient Disposition:  PACU  and patient will return to OR for planned surgery as a staged procedure    SIGNATURE: Divya Guzman MD  DATE: February 25, 2021  TIME: 10:22 PM

## 2021-02-26 NOTE — ANESTHESIA PREPROCEDURE EVALUATION
Procedure:  CYSTOSCOPY RETROGRADE PYELOGRAM WITH INSERTION STENT URETERAL (Right Bladder)    Relevant Problems   ANESTHESIA (within normal limits)      CARDIO (within normal limits)      ENDO (within normal limits)      GI/HEPATIC (within normal limits)      /RENAL   (+) Kidney stones      GYN (within normal limits)      HEMATOLOGY (within normal limits)      MUSCULOSKELETAL (within normal limits)      NEURO/PSYCH   (+) Chronic pain syndrome      PULMONARY   (+) Mild intermittent asthma without complication      Other   (+) Acute cystitis with hematuria   (+) Morbid obesity (HCC)   (+) Urinary retention        Physical Exam    Airway    Mallampati score: II  TM Distance: >3 FB  Neck ROM: full     Dental   No notable dental hx     Cardiovascular  Rhythm: regular, Rate: normal, Cardiovascular exam normal    Pulmonary  Pulmonary exam normal Breath sounds clear to auscultation,     Other Findings        Anesthesia Plan  ASA Score- 3 Emergent    Anesthesia Type- general with ASA Monitors  Additional Monitors:   Airway Plan: LMA  Plan Factors-    Chart reviewed  Existing labs reviewed  Patient summary reviewed  Patient is not a current smoker  Induction- intravenous  Postoperative Plan- Plan for postoperative opioid use  Informed Consent- Anesthetic plan and risks discussed with patient  I personally reviewed this patient with the CRNA  Discussed and agreed on the Anesthesia Plan with the CRNA  Kandice Evans Recent labs personally reviewed:  Lab Results   Component Value Date    WBC 17 68 (H) 02/25/2021    HGB 15 6 (H) 02/25/2021     02/25/2021     Lab Results   Component Value Date    K 4 5 02/25/2021    BUN 12 02/25/2021    CREATININE 1 48 (H) 02/25/2021     Lab Results   Component Value Date    PTT 29 02/25/2021      Lab Results   Component Value Date    INR 0 97 02/25/2021       I, Edilberto Yañez MD, have personally seen and evaluated the patient prior to anesthetic care  I have reviewed the pre-anesthetic record, and other medical records if appropriate to the anesthetic care  If a CRNA is involved in the case, I have reviewed the CRNA assessment, if present, and agree  Risks/benefits and alternatives discussed with patient including possible PONV, sore throat, and possibility of rare anesthetic and surgical emergencies

## 2021-02-26 NOTE — ASSESSMENT & PLAN NOTE
Lab Results   Component Value Date    EGFR 40 02/25/2021    EGFR 63 07/26/2020    EGFR 59 07/24/2020    CREATININE 1 48 (H) 02/25/2021    CREATININE 1 03 07/26/2020    CREATININE 1 08 07/24/2020     Continue to monitor  Continue IV fluid for hydration

## 2021-02-26 NOTE — PROGRESS NOTES
Discussed with patient about her antibiotics allergy  Patient report skin rash when she takes amoxicillin  She does not remember allergy to cephalexin  Will try IV cefazolin

## 2021-02-26 NOTE — ASSESSMENT & PLAN NOTE
Lab Results   Component Value Date    EGFR 39 02/26/2021    EGFR 40 02/25/2021    EGFR 63 07/26/2020    CREATININE 1 54 (H) 02/26/2021    CREATININE 1 48 (H) 02/25/2021    CREATININE 1 03 07/26/2020     Continue to monitor  Continue IV fluid for hydration

## 2021-02-26 NOTE — EMTALA/ACUTE CARE TRANSFER
[address]  [facility] [address]  Dept:       EMTALA TRANSFER CONSENT    NAME Theresa Willson                                         1970                              MRN [id number]    I have been informed of my rights regarding examination, treatment, and transfer   by Dr Farheen Silvestre DO    Benefits: Specialized equipment and/or services available at the receiving facility (Include comment)________________________    Risks: Potential for delay in receiving treatment, Potential deterioration of medical condition, Increased discomfort during transfer      Consent for Transfer:  I acknowledge that my medical condition has been evaluated and explained to me by the emergency department physician or other qualified medical person and/or my attending physician, who has recommended that I be transferred to the service of  Accepting Physician: Dr Mitzi Daniels at [address] [facility] [address], [address] : [place], [place]  The above potential benefits of such transfer, the potential risks associated with such transfer, and the probable risks of not being transferred have been explained to me, and I fully understand them  The doctor has explained that, in my case, the benefits of transfer outweigh the risks  I agree to be transferred  I authorize the performance of emergency medical procedures and treatments upon me in both transit and upon arrival at the receiving facility  Additionally, I authorize the release of any and all medical records to the receiving facility and request they be transported with me, if possible  I understand that the safest mode of transportation during a medical emergency is an ambulance and that the Hospital advocates the use of this mode of transport   Risks of traveling to the receiving facility by car, including absence of medical control, life sustaining equipment, such as oxygen, and medical personnel has been explained to me and I fully understand them  (TARAN CORRECT BOX BELOW)  [  ]  I consent to the stated transfer and to be transported by ambulance/helicopter  [  ]  I consent to the stated transfer, but refuse transportation by ambulance and accept full responsibility for my transportation by car  I understand the risks of non-ambulance transfers and I exonerate the Hospital and its staff from any deterioration in my condition that results from this refusal     X___________________________________________    DATE  21  TIME________  Signature of patient or legally responsible individual signing on patient behalf           RELATIONSHIP TO PATIENT_________________________          Provider Certification    NAME Brynn Trammell                                         1970                              MRN [id number]    A medical screening exam was performed on the above named patient  Based on the examination:    Condition Necessitating Transfer The primary encounter diagnosis was Hydronephrosis of right kidney  Diagnoses of Ureteral calculi, Sepsis ([facility] [address] ), Obstructive uropathy, and YONY (acute kidney injury) ([facility] [address] ) were also pertinent to this visit      Patient Condition: The patient has been stabilized such that within reasonable medical probability, no material deterioration of the patient condition or the condition of the unborn child(antonia) is likely to result from the transfer    Reason for Transfer: Level of Care needed not available at this facility    Transfer Requirements: [address] [place] [address]   · Space available and qualified personnel available for treatment as acknowledged by Trish Wilks  · Agreed to accept transfer and to provide appropriate medical treatment as acknowledged by       Dr Beny Bolden  · Appropriate medical records of the examination and treatment of the patient are provided at the time of transfer   [address], [address] _______  · Transfer will be performed by qualified personnel from    and appropriate transfer equipment as required, including the use of necessary and appropriate life support measures  Provider Certification: I have examined the patient and explained the following risks and benefits of being transferred/refusing transfer to the patient/family:  General risk, such as traffic hazards, adverse weather conditions, rough terrain or turbulence, possible failure of equipment (including vehicle or aircraft), or consequences of actions of persons outside the control of the transport personnel, Unanticipated needs of medical equipment and personnel during transport, Risk of worsening condition, The possibility of a transport vehicle being unavailable      Based on these reasonable risks and benefits to the patient and/or the unborn child(antonia), and based upon the information available at the time of the patients examination, I certify that the medical benefits reasonably to be expected from the provision of appropriate medical treatments at another medical facility outweigh the increasing risks, if any, to the individuals medical condition, and in the case of labor to the unborn child, from effecting the transfer      X____________________________________________ DATE 02/25/21        TIME_______      ORIGINAL - SEND TO MEDICAL RECORDS   COPY - SEND WITH PATIENT DURING TRANSFER

## 2021-02-26 NOTE — PROGRESS NOTES
[facility]  Progress Note - Josh Rowe 1970, 44 y o  female MRN: [id number]    Unit/Bed#: [facility] 925-01 Encounter: [id number]    Primary Care Provider: Dipika Fuentes DO   Date and time admitted to hospital: 2/25/2021  9:32 PM        * Right ureteral stone  Assessment & Plan  Patient presented with right flank pain  Underlying history of nephrolithiasis  Found to have obstructing right ureteral stone with severe hydronephrosis  She was transferred to [facility] for urgent cystoscopy and urology intervention  Currently status post cystoscopy and right ureteral stent placement  Will need definitive stone management in the future  Urology is following    YONY (acute kidney injury) [facility])  Assessment & Plan  POA, likely in setting of obstructing kidney stone, severe hydronephrosis, UTI/sepsis  S/p stent placement with urology  Monitor with IV fluids   BMP in AM    Hyponatremia  Assessment & Plan  Likely secondary to above  Resolved with IV fluids    Acquired hypothyroidism  Assessment & Plan  Continue levothyroxine    Chronic kidney disease, stage 3  Assessment & Plan  Lab Results   Component Value Date    EGFR 39 02/26/2021    EGFR 40 02/25/2021    EGFR 63 07/26/2020    CREATININE 1 54 (H) 02/26/2021    CREATININE 1 48 (H) 02/25/2021    CREATININE 1 03 07/26/2020     Continue to monitor  Continue IV fluid for hydration    Sepsis ([address] )  Assessment & Plan  POA, presented with fever and leukocytosis  Likely secondary to above  Patient has allergy to beta lactams - reports rash  Tolerating IV Ancef   F/u urine culture     Mild intermittent asthma without complication  Assessment & Plan  Continue Singulair and albuterol p r n  Along with Flovent    Chronic pain syndrome  Assessment & Plan  Patient has known history of fibromyalgia and chronic pain syndrome  Continue home regimen of Topamax, Neurontin          VTE Pharmacologic Prophylaxis:   Pharmacologic: Heparin  Mechanical VTE Prophylaxis in Place: Yes    Patient Centered Rounds: I have performed bedside rounds with nursing staff today  Discussions with Specialists or Other Care Team Provider: RN    Education and Discussions with Family / Patient: patient, declined call to family    Time Spent for Care: 20 minutes  More than 50% of total time spent on counseling and coordination of care as described above  Current Length of Stay: 1 day(s)    Current Patient Status: Inpatient   Certification Statement: The patient will continue to require additional inpatient hospital stay due to YONY, sepsis    Discharge Plan: when urine culture results and when renal function improves    Code Status: Level 1 - Full Code      Subjective: The patient reports to right sided abd soreness but otherwise no complaints and feels better today    Objective:     Vitals:   Temp (24hrs), Av 9 °F (37 2 °C), Min:97 4 °F (36 3 °C), Max:102 9 °F (39 4 °C)    Temp:  [97 4 °F (36 3 °C)-102 9 °F (39 4 °C)] 97 5 °F (36 4 °C)  HR:  [] 72  Resp:  [12-24] 20  BP: (100-154)/() 108/62  SpO2:  [93 %-99 %] 96 %  Body mass index is 48 1 kg/m²  Input and Output Summary (last 24 hours): Intake/Output Summary (Last 24 hours) at 2021 1557  Last data filed at 2021 1331  Gross per 24 hour   Intake 2523 33 ml   Output 1850 ml   Net 673 33 ml       Physical Exam:     Physical Exam  Vitals signs reviewed  Constitutional:       General: She is not in acute distress  Appearance: She is not toxic-appearing  HENT:      Head: Normocephalic and atraumatic  Eyes:      General: No scleral icterus  Extraocular Movements: Extraocular movements intact  Neck:      Musculoskeletal: Normal range of motion  Cardiovascular:      Rate and Rhythm: Normal rate and regular rhythm  Pulmonary:      Effort: Pulmonary effort is normal  No respiratory distress  Breath sounds: Normal breath sounds     Abdominal:      General: Bowel sounds are normal  There is no distension  Palpations: Abdomen is soft  Tenderness: There is abdominal tenderness (RLQ)  Musculoskeletal: Normal range of motion  Skin:     General: Skin is warm  Neurological:      General: No focal deficit present  Mental Status: She is alert and oriented to person, place, and time  Psychiatric:         Mood and Affect: Mood normal          Behavior: Behavior normal          Thought Content: Thought content normal          Additional Data:     Labs:    Results from last 7 days   Lab Units 02/26/21  0444 02/25/21  1837   WBC Thousand/uL 13 32* 17 68*   HEMOGLOBIN g/dL 15 3 15 6*   HEMATOCRIT % 46 8* 46 9*   PLATELETS Thousands/uL 252 253   NEUTROS PCT %  --  76*   LYMPHS PCT %  --  14   MONOS PCT %  --  7   EOS PCT %  --  1     Results from last 7 days   Lab Units 02/26/21  0444 02/25/21  1837   SODIUM mmol/L 142 133*   POTASSIUM mmol/L 4 0 4 5   CHLORIDE mmol/L 110* 99*   CO2 mmol/L 25 24   BUN mg/dL 17 12   CREATININE mg/dL 1 54* 1 48*   ANION GAP mmol/L 7 10   CALCIUM mg/dL 8 9 8 8   ALBUMIN g/dL  --  3 2*   TOTAL BILIRUBIN mg/dL  --  0 92   ALK PHOS U/L  --  117*   ALT U/L  --  28   AST U/L  --  30   GLUCOSE RANDOM mg/dL 114 105     Results from last 7 days   Lab Units 02/25/21  1845   INR  0 97             Results from last 7 days   Lab Units 02/25/21  1857 02/25/21  1845   LACTIC ACID mmol/L  --  1 0   PROCALCITONIN ng/ml 0 13  --            * I Have Reviewed All Lab Data Listed Above  * Additional Pertinent Lab Tests Reviewed: All Labs Within Last 24 Hours Reviewed    Imaging:    Imaging Reports Reviewed Today Include: none  Imaging Personally Reviewed by Myself Includes:  none    Recent Cultures (last 7 days):     Results from last 7 days   Lab Units 02/25/21  1850 02/25/21  1845   BLOOD CULTURE  Received in Microbiology Lab  Culture in Progress  Received in Microbiology Lab  Culture in Progress         Last 24 Hours Medication List:   Current Facility-Administered Medications Medication Dose Route Frequency Provider Last Rate    acetaminophen  650 mg Oral Q6H PRN Harding Butts, DO      albuterol  2 puff Inhalation TID PRN Harding Butts, DO      cefazolin  2,000 mg Intravenous Q8H Harding Butts, DO 2,000 mg (02/26/21 1551)    fluticasone  1 puff Inhalation Daily Harding Butts, DO      gabapentin  600 mg Oral BID Harding Butts, DO      gentamicin  1 mg/kg (Ideal) Intravenous Once Harding Butts, DO      heparin (porcine)  5,000 Units Subcutaneous [facility] Harding Butts, DO      levothyroxine  25 mcg Oral Early Morning Harding [place], DO      montelukast  10 mg Oral Daily Harding Butts, DO      morphine injection  2 mg Intravenous Q1H PRN Harding Butts, DO      ondansetron  4 mg Intravenous Q6H PRN Harding Butts, DO      oxyCODONE  5 mg Oral Q4H PRN Harding Butts, DO      pantoprazole  20 mg Oral BID (AM & Afternoon) Harding Butts, DO      sodium chloride  100 mL/hr Intravenous Continuous Harding Butts,  mL/hr (02/26/21 7201)    tamsulosin  0 4 mg Oral Daily With Sunita Fernando, DO      topiramate  50 mg Oral Q12H [address] [facility], DO          Today, Patient Was Seen By: Fritz Naqvi PA-C    ** Please Note: Dictation voice to text software may have been used in the creation of this document   **

## 2021-02-26 NOTE — MALNUTRITION/BMI
This medical record reflects one or more clinical indicators suggestive of morbid obesity  BMI Findings:  Adult BMI Classifications: Morbid Obesity 45-49 9     Body mass index is 48 1 kg/m²  See Nutrition note dated 2/26/21 for additional details  Completed nutrition assessment is viewable in the nutrition documentation

## 2021-02-26 NOTE — ASSESSMENT & PLAN NOTE
Patient presented with right flank pain  Underlying history of nephrolithiasis  Found to have obstructing right ureteral stone with severe hydronephrosis  She was transferred to [facility] for urgent cystoscopy and urology intervention  Currently status post cystoscopy and right ureteral stent placement    Will need definitive stone management in the future  Urology is following

## 2021-02-26 NOTE — ASSESSMENT & PLAN NOTE
POA, presented with fever and leukocytosis  Likely secondary to above  Patient has allergy to beta lactams  She received 1 dose of Cipro IV at ED today and 1 dose of gentamicin in OR  Follow on urine and blood cultures  Will clarify with patient when she is more awake from surgery about the severity of her allergy

## 2021-02-26 NOTE — ED NOTES
Patient transported to [id number] [facility], [address] [place] [address]  02/25/21 8174

## 2021-02-26 NOTE — CASE MANAGEMENT
LOS: 1  Not a bundle  Readmission risk: Green  Met with pt and discussed role of CM  Pt lives with her SO who is her caregiver in a 1st floor apt with 12 steps at entrance  Pt reports no difficulty managing stairs  Pt receives assist with ADLs: bathing/dressing from her SO  Pt has DME including: cane and s/c  No prior [facility]  Preference for pharmacy is [facility] in [place], [place]  No MH/D&A tx hx  PCP- Juanjo Bravo DO ([phone])  No POA  Family will transport upon d/c     CM reviewed d/c planning process including the following: identifying help at home, patient preference for d/c planning needs, Discharge Lounge, [facility] Meds to Bed program, availability of treatment team to discuss questions or concerns patient and/or family may have regarding understanding medications and recognizing signs and symptoms once discharged  CM also encouraged patient to follow up with all recommended appointments after discharge  Patient advised of importance for patient and family to participate in managing patients medical well being  Patient/caregiver received discharge checklist  Content reviewed  Patient/caregiver encouraged to participate in discharge plan of care prior to discharge home

## 2021-02-26 NOTE — H&P (VIEW-ONLY)
UROLOGY CONSULTATION NOTE     Patient Identifiers: Josh Rowe (MRN [id number])  Service Requesting Consultation: [facility]  Service Providing Consultation:  Urology, Graciela Alvarado MD    Date of Service: 2/25/2021  Consults    Reason for Consultation:  Fever, right ureteral stone    History of Present Illness:     Josh Rowe is a 44 y o  old female with 3 days of right flank pain  The pain has been getting worse  Today she developed a fever and shaking chills  She has been nauseous and not eating  No vomiting  She presented to the emergency room where she was noted to have a fever, elevated white count and a large right ureteral stone with hydronephrosis  Urology was contacted  The patient has now been transferred to [facility] for cystoscopy and placement of a right ureteral stent  ASSESSMENT & PLAN:     1:  Right ureteral stones with hydronephrosis, fever and impending sepsis  2:  Acute kidney injury    Plan:  Treatment options were discussed with the patient  These include percutaneous nephrostomy tube placement versus stent placement  The pros and cons of each were discussed the patient has elected proceed with  cystoscopy and placement of a right ureteral stent  She understands the stone will not be treated and she will require a definitive ureteroscopic procedure in the future once her infection has been well treated  Past Medical, Past Surgical History:     Past Medical History:   Diagnosis Date    Arthritis     Asthma     COPD (chronic obstructive pulmonary disease) ([facility] [address] )     Fibromyalgia     Kidney stones    :    Past Surgical History:   Procedure Laterality Date    APPENDECTOMY      JOINT REPLACEMENT      bilateral knee   LIPOMA RESECTION     :    Medications, Allergies:     Current Facility-Administered Medications   Medication Dose Route Frequency    lidocaine (PF) (XYLOCAINE-MPF) 1 % injection 0 5 mL  0 5 mL Infiltration Once PRN       Allergies:   Allergies Allergen Reactions    Buspirone Palpitations     Other reaction(s): Other (Please comment)  Palpitations      Amoxicillin      Other reaction(s): Rash  rash      Cephalexin Hives    Ketorolac Tromethamine     Latex Hives    Varenicline Rash     Other reaction(s): Heart Races  Heart races      :    Social and Family History:   Social History:   Social History     Tobacco Use    Smoking status: Current Every Day Smoker     Packs/day: 0 50    Smokeless tobacco: Never Used   Substance Use Topics    Alcohol use: Never     Frequency: Never    Drug use: Never     Social History     Tobacco Use   Smoking Status Current Every Day Smoker    Packs/day: 0 50   Smokeless Tobacco Never Used       Family History:  Family History   Problem Relation Age of Onset    Hypertension Father    :     Review of Systems:     General: Elisha Fever, chills, or night sweats  Cardiac: Negative for chest pain  Pulmonary: Negative for shortness of breath  Gastrointestinal: Denies Abdominal pain, no  nausea, vomiting, or diarrhea   Genitourinary: See HPI above  Neurologic: No focal signs   Musculo-skeletal: No complaints    All other systems queried were negative  Physical Exam:     There were no vitals taken for this visit    General appearance: alert, flushed and mild distress  Head: Normocephalic, without obvious abnormality, atraumatic  Neck: supple, symmetrical, trachea midline  Back: Right CVA tenderness  Lungs: Normal respiratory effort  Heart: regular rate and rhythm  Abdomen: Obese, soft nontender  Extremities: extremities normal, warm and well-perfused; no cyanosis, clubbing, or edema  Neurologic: Grossly normal      Labs:     Lab Results   Component Value Date    HGB 15 6 (H) 02/25/2021    HCT 46 9 (H) 02/25/2021    WBC 17 68 (H) 02/25/2021     02/25/2021   ]    Lab Results   Component Value Date    K 4 5 02/25/2021    CL 99 (L) 02/25/2021    CO2 24 02/25/2021    BUN 12 02/25/2021    CREATININE 1 48 (H) 02/25/2021    CALCIUM 8 8 02/25/2021   ]    Imaging:   I personally reviewed the images and report of the following studies, and reviewed them with the patient:          Thank you for allowing me to participate in this patients care  Please do not hesitate to call with any additional questions    Tyler Lu MD

## 2021-02-27 LAB
ANION GAP SERPL CALCULATED.3IONS-SCNC: 7 MMOL/L (ref 4–13)
BACTERIA UR CULT: NORMAL
BASOPHILS # BLD AUTO: 0.04 THOUSANDS/ΜL (ref 0–0.1)
BASOPHILS NFR BLD AUTO: 0 % (ref 0–1)
BUN SERPL-MCNC: 26 MG/DL (ref 5–25)
CALCIUM SERPL-MCNC: 9 MG/DL (ref 8.3–10.1)
CHLORIDE SERPL-SCNC: 111 MMOL/L (ref 100–108)
CO2 SERPL-SCNC: 26 MMOL/L (ref 21–32)
CREAT SERPL-MCNC: 1.37 MG/DL (ref 0.6–1.3)
EOSINOPHIL # BLD AUTO: 0.01 THOUSAND/ΜL (ref 0–0.61)
EOSINOPHIL NFR BLD AUTO: 0 % (ref 0–6)
ERYTHROCYTE [DISTWIDTH] IN BLOOD BY AUTOMATED COUNT: 12.9 % (ref 11.6–15.1)
GFR SERPL CREATININE-BSD FRML MDRD: 44 ML/MIN/1.73SQ M
GLUCOSE SERPL-MCNC: 126 MG/DL (ref 65–140)
HCT VFR BLD AUTO: 41.1 % (ref 34.8–46.1)
HGB BLD-MCNC: 13.4 G/DL (ref 11.5–15.4)
IMM GRANULOCYTES # BLD AUTO: 0.12 THOUSAND/UL (ref 0–0.2)
IMM GRANULOCYTES NFR BLD AUTO: 1 % (ref 0–2)
LYMPHOCYTES # BLD AUTO: 2.16 THOUSANDS/ΜL (ref 0.6–4.47)
LYMPHOCYTES NFR BLD AUTO: 12 % (ref 14–44)
MCH RBC QN AUTO: 28.7 PG (ref 26.8–34.3)
MCHC RBC AUTO-ENTMCNC: 32.6 G/DL (ref 31.4–37.4)
MCV RBC AUTO: 88 FL (ref 82–98)
MONOCYTES # BLD AUTO: 1.08 THOUSAND/ΜL (ref 0.17–1.22)
MONOCYTES NFR BLD AUTO: 6 % (ref 4–12)
NEUTROPHILS # BLD AUTO: 14.37 THOUSANDS/ΜL (ref 1.85–7.62)
NEUTS SEG NFR BLD AUTO: 81 % (ref 43–75)
NRBC BLD AUTO-RTO: 0 /100 WBCS
PLATELET # BLD AUTO: 269 THOUSANDS/UL (ref 149–390)
PMV BLD AUTO: 13.1 FL (ref 8.9–12.7)
POTASSIUM SERPL-SCNC: 3.8 MMOL/L (ref 3.5–5.3)
RBC # BLD AUTO: 4.67 MILLION/UL (ref 3.81–5.12)
SODIUM SERPL-SCNC: 144 MMOL/L (ref 136–145)
WBC # BLD AUTO: 17.78 THOUSAND/UL (ref 4.31–10.16)

## 2021-02-27 PROCEDURE — 99232 SBSQ HOSP IP/OBS MODERATE 35: CPT | Performed by: NURSE PRACTITIONER

## 2021-02-27 PROCEDURE — 80048 BASIC METABOLIC PNL TOTAL CA: CPT | Performed by: PHYSICIAN ASSISTANT

## 2021-02-27 PROCEDURE — 85025 COMPLETE CBC W/AUTO DIFF WBC: CPT | Performed by: PHYSICIAN ASSISTANT

## 2021-02-27 RX ORDER — OXYCODONE HYDROCHLORIDE 10 MG/1
10 TABLET ORAL EVERY 4 HOURS PRN
Status: DISCONTINUED | OUTPATIENT
Start: 2021-02-27 | End: 2021-02-28 | Stop reason: HOSPADM

## 2021-02-27 RX ORDER — CIPROFLOXACIN 500 MG/1
500 TABLET, FILM COATED ORAL EVERY 12 HOURS SCHEDULED
Status: DISCONTINUED | OUTPATIENT
Start: 2021-02-27 | End: 2021-02-28 | Stop reason: HOSPADM

## 2021-02-27 RX ADMIN — CEFAZOLIN SODIUM 2000 MG: 2 SOLUTION INTRAVENOUS at 07:47

## 2021-02-27 RX ADMIN — SODIUM CHLORIDE 100 ML/HR: 0.9 INJECTION, SOLUTION INTRAVENOUS at 09:30

## 2021-02-27 RX ADMIN — TAMSULOSIN HYDROCHLORIDE 0.4 MG: 0.4 CAPSULE ORAL at 16:11

## 2021-02-27 RX ADMIN — CIPROFLOXACIN HYDROCHLORIDE 500 MG: 500 TABLET, FILM COATED ORAL at 21:25

## 2021-02-27 RX ADMIN — LEVOTHYROXINE SODIUM 25 MCG: 25 TABLET ORAL at 06:29

## 2021-02-27 RX ADMIN — OXYCODONE HYDROCHLORIDE 5 MG: 5 TABLET ORAL at 09:29

## 2021-02-27 RX ADMIN — TOPIRAMATE 50 MG: 25 TABLET ORAL at 09:29

## 2021-02-27 RX ADMIN — HEPARIN SODIUM 5000 UNITS: 5000 INJECTION INTRAVENOUS; SUBCUTANEOUS at 06:29

## 2021-02-27 RX ADMIN — PANTOPRAZOLE SODIUM 20 MG: 20 TABLET, DELAYED RELEASE ORAL at 16:11

## 2021-02-27 RX ADMIN — HEPARIN SODIUM 5000 UNITS: 5000 INJECTION INTRAVENOUS; SUBCUTANEOUS at 21:24

## 2021-02-27 RX ADMIN — TOPIRAMATE 50 MG: 25 TABLET ORAL at 21:24

## 2021-02-27 RX ADMIN — MORPHINE SULFATE 2 MG: 2 INJECTION, SOLUTION INTRAMUSCULAR; INTRAVENOUS at 11:49

## 2021-02-27 RX ADMIN — CEFAZOLIN SODIUM 2000 MG: 2 SOLUTION INTRAVENOUS at 00:36

## 2021-02-27 RX ADMIN — MONTELUKAST SODIUM 10 MG: 10 TABLET, FILM COATED ORAL at 09:29

## 2021-02-27 RX ADMIN — SODIUM CHLORIDE, SODIUM LACTATE, POTASSIUM CHLORIDE, AND CALCIUM CHLORIDE 1000 ML: .6; .31; .03; .02 INJECTION, SOLUTION INTRAVENOUS at 11:52

## 2021-02-27 RX ADMIN — GABAPENTIN 600 MG: 300 CAPSULE ORAL at 18:36

## 2021-02-27 RX ADMIN — OXYCODONE HYDROCHLORIDE 10 MG: 10 TABLET ORAL at 16:19

## 2021-02-27 RX ADMIN — PANTOPRAZOLE SODIUM 20 MG: 20 TABLET, DELAYED RELEASE ORAL at 06:29

## 2021-02-27 RX ADMIN — GABAPENTIN 600 MG: 300 CAPSULE ORAL at 09:29

## 2021-02-27 RX ADMIN — OXYCODONE HYDROCHLORIDE 10 MG: 10 TABLET ORAL at 21:24

## 2021-02-27 RX ADMIN — MORPHINE SULFATE 2 MG: 2 INJECTION, SOLUTION INTRAMUSCULAR; INTRAVENOUS at 06:31

## 2021-02-27 RX ADMIN — HEPARIN SODIUM 5000 UNITS: 5000 INJECTION INTRAVENOUS; SUBCUTANEOUS at 13:04

## 2021-02-27 NOTE — ASSESSMENT & PLAN NOTE
Lab Results   Component Value Date    EGFR 44 02/27/2021    EGFR 39 02/26/2021    EGFR 40 02/25/2021    CREATININE 1 37 (H) 02/27/2021    CREATININE 1 54 (H) 02/26/2021    CREATININE 1 48 (H) 02/25/2021     Continue to monitor  Approaching baseline- monitor off IV fluids

## 2021-02-27 NOTE — ASSESSMENT & PLAN NOTE
POA, presented with fever and leukocytosis  Likely secondary to above  Patient has allergy to beta lactams - reports rash  Tolerating IV Ancef   F/u urine culture     Resolved 2/27

## 2021-02-27 NOTE — ASSESSMENT & PLAN NOTE
Patient presented with right flank pain  Underlying history of nephrolithiasis  Found to have obstructing right ureteral stone with severe hydronephrosis  She was transferred to [address] [place] [address] for urgent cystoscopy and urology intervention  Currently status post cystoscopy and right ureteral stent placement    Will need definitive stone management in the future  Urology will follow outpatient

## 2021-02-27 NOTE — PROGRESS NOTES
Progress Note - Liliya Laughter 1970, 44 y o  female MRN: [id number]    [facility] Encounter: [id number]    Primary Care Provider: Román Moore DO   Date and time admitted to hospital: 2/25/2021  9:32 PM        * Right ureteral stone  Assessment & Plan  Patient presented with right flank pain  Underlying history of nephrolithiasis  Found to have obstructing right ureteral stone with severe hydronephrosis  She was transferred to [facility] for urgent cystoscopy and urology intervention  Currently status post cystoscopy and right ureteral stent placement  Will need definitive stone management in the future  Urology will follow outpatient     Acute cystitis with hematuria  Assessment & Plan  Cipro due to PCN allergy and IV access difficulty  Awaiting sensitivity    Acquired hypothyroidism  Assessment & Plan  Continue levothyroxine    Chronic kidney disease, stage 3  Assessment & Plan  Lab Results   Component Value Date    EGFR 44 02/27/2021    EGFR 39 02/26/2021    EGFR 40 02/25/2021    CREATININE 1 37 (H) 02/27/2021    CREATININE 1 54 (H) 02/26/2021    CREATININE 1 48 (H) 02/25/2021     Continue to monitor  Approaching baseline- monitor off IV fluids     Sepsis ([address] )  Assessment & Plan  POA, presented with fever and leukocytosis  Likely secondary to above  Patient has allergy to beta lactams - reports rash  Tolerating IV Ancef   F/u urine culture     Resolved 2/27    Mild intermittent asthma without complication  Assessment & Plan  Continue Singulair and albuterol p r n  Along with Flovent    Chronic pain syndrome  Assessment & Plan  Patient has known history of fibromyalgia and chronic pain syndrome  Continue home regimen of Topamax, Neurontin  VTE Pharmacologic Prophylaxis:   Pharmacologic: Heparin  Mechanical VTE Prophylaxis in Place: Yes    Patient Centered Rounds: I have performed bedside rounds with nursing staff today      Discussions with Specialists or Other Care Team Provider: consultants    Education and Discussions with Family / Patient: pt declined call to family    Time Spent for Care: 20 minutes  More than 50% of total time spent on counseling and coordination of care as described above  Current Length of Stay: 2 day(s)    Current Patient Status: Inpatient   Certification Statement: The patient will continue to require additional inpatient hospital stay due to medical stability    Discharge Plan: home    Code Status: Level 1 - Full Code      Subjective:   Continues with severe pain in flank requiring narcotic pain medicine  IVF infiltrated, refusing another IV site  Objective:     Vitals:   Temp (24hrs), Av 5 °F (36 4 °C), Min:97 3 °F (36 3 °C), Max:97 7 °F (36 5 °C)    Temp:  [97 3 °F (36 3 °C)-97 7 °F (36 5 °C)] 97 3 °F (36 3 °C)  HR:  [60-83] 83  Resp:  [20] 20  BP: ()/(45-88) 118/88  SpO2:  [91 %-97 %] 91 %  Body mass index is 48 1 kg/m²  Input and Output Summary (last 24 hours): Intake/Output Summary (Last 24 hours) at 2021 1638  Last data filed at 2021 1519  Gross per 24 hour   Intake 3079 39 ml   Output 1600 ml   Net 1479 39 ml       Physical Exam:     Physical Exam  Vitals signs and nursing note reviewed  Constitutional:       Appearance: Normal appearance  She is obese  HENT:      Head: Normocephalic and atraumatic  Eyes:      Conjunctiva/sclera: Conjunctivae normal    Neck:      Musculoskeletal: Normal range of motion and neck supple  Cardiovascular:      Rate and Rhythm: Normal rate and regular rhythm  Pulses: Normal pulses  Heart sounds: Normal heart sounds  Pulmonary:      Effort: Pulmonary effort is normal       Breath sounds: Normal breath sounds  Abdominal:      General: Abdomen is flat  Palpations: Abdomen is soft  Tenderness: There is abdominal tenderness in the right lower quadrant  There is right CVA tenderness  Musculoskeletal: Normal range of motion     Skin:     General: Skin is warm and dry       Capillary Refill: Capillary refill takes less than 2 seconds  Neurological:      General: No focal deficit present  Mental Status: She is alert and oriented to person, place, and time  Cranial Nerves: No cranial nerve deficit  Sensory: No sensory deficit  Motor: No weakness  Coordination: Coordination normal       Gait: Gait normal    Psychiatric:         Mood and Affect: Mood normal          Behavior: Behavior normal        Additional Data:     Labs:    Results from last 7 days   Lab Units 02/27/21  0539   WBC Thousand/uL 17 78*   HEMOGLOBIN g/dL 13 4   HEMATOCRIT % 41 1   PLATELETS Thousands/uL 269   NEUTROS PCT % 81*   LYMPHS PCT % 12*   MONOS PCT % 6   EOS PCT % 0     Results from last 7 days   Lab Units 02/27/21  0539  02/25/21  1837   SODIUM mmol/L 144   < > 133*   POTASSIUM mmol/L 3 8   < > 4 5   CHLORIDE mmol/L 111*   < > 99*   CO2 mmol/L 26   < > 24   BUN mg/dL 26*   < > 12   CREATININE mg/dL 1 37*   < > 1 48*   ANION GAP mmol/L 7   < > 10   CALCIUM mg/dL 9 0   < > 8 8   ALBUMIN g/dL  --   --  3 2*   TOTAL BILIRUBIN mg/dL  --   --  0 92   ALK PHOS U/L  --   --  117*   ALT U/L  --   --  28   AST U/L  --   --  30   GLUCOSE RANDOM mg/dL 126   < > 105    < > = values in this interval not displayed  Results from last 7 days   Lab Units 02/25/21  1845   INR  0 97             Results from last 7 days   Lab Units 02/25/21 1857 02/25/21  1845   LACTIC ACID mmol/L  --  1 0   PROCALCITONIN ng/ml 0 13  --            * I Have Reviewed All Lab Data Listed Above  * Additional Pertinent Lab Tests Reviewed: [address] 64 Admission Reviewed    Imaging:    Imaging Reports Reviewed Today Include: all  Imaging Personally Reviewed by Myself Includes:      Recent Cultures (last 7 days):     Results from last 7 days   Lab Units 02/25/21 2209 02/25/21 2144 02/25/21  1859 02/25/21  1850 02/25/21  1845   BLOOD CULTURE   --   --   --  No Growth at 24 hrs   No Growth at 24 hrs    URINE CULTURE  >100,000 cfu/ml Enterococcus faecalis* No Growth <1000 cfu/mL 10,000-19,000 cfu/ml   --   --        Last 24 Hours Medication List:   Current Facility-Administered Medications   Medication Dose Route Frequency Provider Last Rate    acetaminophen  650 mg Oral Q6H PRN [facility], DO      albuterol  2 puff Inhalation TID PRN [facility], DO      ciprofloxacin  500 mg Oral Q12H [facility] Marie S Tri, CRNP      fluticasone  1 puff Inhalation Daily [facility], DO      gabapentin  600 mg Oral BID [facility], DO      gentamicin  1 mg/kg (Ideal) Intravenous Once [facility], DO      heparin (porcine)  5,000 Units Subcutaneous [facility], DO      levothyroxine  25 mcg Oral Early Morning [facility], DO      montelukast  10 mg Oral Daily [facility], DO      morphine injection  2 mg Intravenous Q1H PRN Marie S Tri, CRNP      ondansetron  4 mg Intravenous Q6H PRN [facility], DO      oxyCODONE  10 mg Oral Q4H PRN Marie S Tri, CRNP      oxyCODONE  5 mg Oral Q4H PRN [facility], DO      pantoprazole  20 mg Oral BID (AM & Afternoon) [facility], DO      sodium chloride  100 mL/hr Intravenous Continuous [facility],  mL/hr (02/27/21 0930)    tamsulosin  0 4 mg Oral Daily With [employer] Abdullahi, DO      topiramate  50 mg Oral Q12H [facility], DO          Today, Patient Was Seen By: KM Flynn    ** Please Note: Dictation voice to text software may have been used in the creation of this document   **

## 2021-02-28 VITALS
BODY MASS INDEX: 48.4 KG/M2 | TEMPERATURE: 97.6 F | HEART RATE: 71 BPM | WEIGHT: 263.01 LBS | DIASTOLIC BLOOD PRESSURE: 64 MMHG | SYSTOLIC BLOOD PRESSURE: 98 MMHG | RESPIRATION RATE: 20 BRPM | OXYGEN SATURATION: 94 % | HEIGHT: 62 IN

## 2021-02-28 LAB
ANION GAP SERPL CALCULATED.3IONS-SCNC: 4 MMOL/L (ref 4–13)
BACTERIA UR CULT: ABNORMAL
BACTERIA UR CULT: NORMAL
BUN SERPL-MCNC: 21 MG/DL (ref 5–25)
CALCIUM SERPL-MCNC: 9 MG/DL (ref 8.3–10.1)
CHLORIDE SERPL-SCNC: 113 MMOL/L (ref 100–108)
CO2 SERPL-SCNC: 27 MMOL/L (ref 21–32)
CREAT SERPL-MCNC: 1.23 MG/DL (ref 0.6–1.3)
ERYTHROCYTE [DISTWIDTH] IN BLOOD BY AUTOMATED COUNT: 12.9 % (ref 11.6–15.1)
GFR SERPL CREATININE-BSD FRML MDRD: 51 ML/MIN/1.73SQ M
GLUCOSE SERPL-MCNC: 95 MG/DL (ref 65–140)
HCT VFR BLD AUTO: 42.9 % (ref 34.8–46.1)
HGB BLD-MCNC: 13.5 G/DL (ref 11.5–15.4)
MCH RBC QN AUTO: 28.7 PG (ref 26.8–34.3)
MCHC RBC AUTO-ENTMCNC: 31.5 G/DL (ref 31.4–37.4)
MCV RBC AUTO: 91 FL (ref 82–98)
PLATELET # BLD AUTO: 256 THOUSANDS/UL (ref 149–390)
PMV BLD AUTO: 12.9 FL (ref 8.9–12.7)
POTASSIUM SERPL-SCNC: 4.4 MMOL/L (ref 3.5–5.3)
RBC # BLD AUTO: 4.7 MILLION/UL (ref 3.81–5.12)
SODIUM SERPL-SCNC: 144 MMOL/L (ref 136–145)
WBC # BLD AUTO: 11.98 THOUSAND/UL (ref 4.31–10.16)

## 2021-02-28 PROCEDURE — 85027 COMPLETE CBC AUTOMATED: CPT | Performed by: NURSE PRACTITIONER

## 2021-02-28 PROCEDURE — 80048 BASIC METABOLIC PNL TOTAL CA: CPT | Performed by: NURSE PRACTITIONER

## 2021-02-28 PROCEDURE — 99239 HOSP IP/OBS DSCHRG MGMT >30: CPT | Performed by: NURSE PRACTITIONER

## 2021-02-28 RX ORDER — CIPROFLOXACIN 500 MG/1
500 TABLET, FILM COATED ORAL EVERY 12 HOURS SCHEDULED
Qty: 10 TABLET | Refills: 0 | Status: SHIPPED | OUTPATIENT
Start: 2021-02-28 | End: 2021-02-28

## 2021-02-28 RX ORDER — OXYCODONE HYDROCHLORIDE 5 MG/1
5 TABLET ORAL EVERY 4 HOURS PRN
Qty: 18 TABLET | Refills: 0 | Status: SHIPPED | OUTPATIENT
Start: 2021-02-28 | End: 2021-03-01

## 2021-02-28 RX ORDER — OXYCODONE HYDROCHLORIDE 5 MG/1
5 TABLET ORAL EVERY 4 HOURS PRN
Qty: 18 TABLET | Refills: 0 | Status: SHIPPED | OUTPATIENT
Start: 2021-02-28 | End: 2021-02-28

## 2021-02-28 RX ORDER — CIPROFLOXACIN 500 MG/1
500 TABLET, FILM COATED ORAL EVERY 12 HOURS SCHEDULED
Qty: 10 TABLET | Refills: 0 | Status: SHIPPED | OUTPATIENT
Start: 2021-02-28 | End: 2021-03-05

## 2021-02-28 RX ADMIN — GABAPENTIN 600 MG: 300 CAPSULE ORAL at 08:33

## 2021-02-28 RX ADMIN — CIPROFLOXACIN HYDROCHLORIDE 500 MG: 500 TABLET, FILM COATED ORAL at 08:33

## 2021-02-28 RX ADMIN — HEPARIN SODIUM 5000 UNITS: 5000 INJECTION INTRAVENOUS; SUBCUTANEOUS at 05:31

## 2021-02-28 RX ADMIN — FLUTICASONE PROPIONATE 1 PUFF: 220 AEROSOL, METERED RESPIRATORY (INHALATION) at 08:34

## 2021-02-28 RX ADMIN — ACETAMINOPHEN 650 MG: 325 TABLET, FILM COATED ORAL at 08:34

## 2021-02-28 RX ADMIN — TOPIRAMATE 50 MG: 25 TABLET ORAL at 08:34

## 2021-02-28 RX ADMIN — OXYCODONE HYDROCHLORIDE 10 MG: 10 TABLET ORAL at 05:30

## 2021-02-28 RX ADMIN — PANTOPRAZOLE SODIUM 20 MG: 20 TABLET, DELAYED RELEASE ORAL at 05:31

## 2021-02-28 RX ADMIN — LEVOTHYROXINE SODIUM 25 MCG: 25 TABLET ORAL at 05:31

## 2021-02-28 RX ADMIN — MONTELUKAST SODIUM 10 MG: 10 TABLET, FILM COATED ORAL at 08:33

## 2021-02-28 NOTE — DISCHARGE INSTRUCTIONS
Ureteral Stones   WHAT YOU NEED TO KNOW:   A ureteral stone forms in the kidney and moves down the ureter and gets stuck there  The ureter is the tube that takes urine from the kidney to the bladder  Stones can form in the urinary system when your urine has high levels of minerals and salts  Urinary stones can be made of uric acid, calcium, phosphate, or oxalate crystals  WHILE YOU ARE HERE:   Informed consent  is a legal document that explains the tests, treatments, or procedures that you may need  Informed consent means you understand what will be done and can make decisions about what you want  You give your permission when you sign the consent form  You can have someone sign this form for you if you are not able to sign it  You have the right to understand your medical care in words you know  Before you sign the consent form, understand the risks and benefits of what will be done  Make sure all your questions are answered  An IV  is a small tube placed in your vein that is used to give you medicine or liquids  Medicine:   · Alpha blockers  may be given to help your ureteral stones pass  · Antinausea medicine  calms your stomach and prevents vomiting  · NSAIDs  help decrease swelling and pain  · Prescription pain medicine  may be given to decrease pain  Do not wait until the pain is severe before you ask for more medicine  Tests:   · Urine tests  may show if you have blood in your urine  They may also show high amounts of the substances that form stones, such as uric acid  · Blood tests  show how well your kidneys are working  They may also be used to check the levels of calcium or uric acid in your blood  · An x-ray or CT scan  of your kidneys, ureters, and bladder may be done  You may be given contrast liquid to help these show up better in the pictures  You may need to have more than one x-ray   Tell the healthcare provider if you have ever had an allergic reaction to contrast liquid  Treatment:  You may need any of the following if your ureteral stones are too large to pass or do not pass on their own:  · Lithotripsy  is a procedure that uses shock waves to break up the stones  Pieces of the stones can then pass in your urine  · Ureteroscopic kidney stone removal  is a procedure to remove your ureteral stone  · Surgery  may be needed to remove your ureteral stone if other procedures do not work  RISKS:   Ureteral stones can cause an infection  Ureteral stones can also block the flow of urine and lead to kidney damage or failure  Once you have had urinary stones, you are at higher risk of getting them again  CARE AGREEMENT:   You have the right to help plan your care  Learn about your health condition and how it may be treated  Discuss treatment options with your healthcare providers to decide what care you want to receive  You always have the right to refuse treatment  © Copyright [address] Information is for End User's use only and may not be sold, redistributed or otherwise used for commercial purposes  All illustrations and images included in CareNotes® are the copyrighted property of A D A M , Inc  or [address] [place], [place] [address]ang   The above information is an  only  It is not intended as medical advice for individual conditions or treatments  Talk to your doctor, nurse or pharmacist before following any medical regimen to see if it is safe and effective for you  Acute Kidney Injury   AMBULATORY CARE:   Acute kidney injury (YONY) is also called acute kidney failure, or acute renal failure  YONY happens when your kidneys suddenly stop working correctly  Normally, the kidneys remove fluid, chemicals, and waste from your blood  These wastes are turned into urine by your kidneys  YONY usually happens over hours or days  When you have YONY, your kidneys do not remove the waste, chemicals, or extra fluid from your body   A normal amount of urine is not produced  YONY is usually temporary, but it may become a chronic kidney condition  Causes of YONY:   · Decreased blood flow to the kidney, such as from hypercalcemia (high blood calcium level) or severe heart disease     · A disease or condition that affects the kidneys, such as hypertension (high blood pressure) or diabetes     · A blockage in the kidney or ureter, such as a kidney or bladder stone, enlarged prostate, or tumor    Common symptoms include the following: You may not have any symptoms with early or mild YONY  As YONY progresses, you may have any of the following:  · Decrease in the amount of urine or no urination    · Swelling in your arms, legs, or feet     · Weakness, drowsiness, or no appetite    · Nausea, flank pain, muscle twitching or muscle cramps    · Itchy skin, or your, breath or body smells like urine    · Behavior changes, confusion, disorientation, or seizures    Call 911 if:   · You have sudden chest pain or trouble breathing  Seek care immediately if:   · Your symptoms get worse  Contact your healthcare provider if:   · Your symptoms return  · Your blood sugar or blood pressure level is not within the range your healthcare provider recommends  · You have questions or concerns about your condition or care  Treatment for YONY  depends upon the cause of your acute kidney injury and how severe it is  Usually, YONY will be monitored in the hospital  If you have mild YONY, you may be able to go home to recover  Your healthcare providers will treat the cause of your YONY  You may need IV fluids if your YONY was caused by little or no fluid in your body  You may need dialysis to remove waste and extra fluid from your body  Nutrition:  Your healthcare provider may tell you to eat food low in sodium (salt), potassium, phosphorus, or protein  A dietitian can help you plan your meals  Drink liquids as directed:   Your healthcare provider may recommend that you drink a certain amount of liquids  This will help your kidneys work better and decrease your risk for dehydration  Ask how much liquid to drink each day and which liquids are best for you  What you can do to manage and prevent YONY:   · Monitor and manage other health conditions  such as diabetes, high blood pressure, or heart disease  These conditions increase your risk for acute kidney injury  Take your medicines for these conditions as directed  Also, monitor your blood sugar and blood pressure levels as directed  Contact your healthcare provider if your levels are not in the range he or she says it should be  · Talk to your healthcare provider before you take over-the-counter-medicine  NSAIDs, stomach medicine, or laxatives may harm your kidneys and increase your risk for acute kidney injury  If it is okay to take the medicine, follow the directions on the package  Do not take more than directed  · Tell healthcare providers you have had acute kidney injury  before you get contrast liquid for an x-ray or CT scan  Your healthcare provider may give you medicine to prevent kidney problems caused by the liquid  Follow up with your healthcare provider as directed:  Write down your questions so you remember to ask them during your visits  © Copyright [address] [place] [address] Information is for End User's use only and may not be sold, redistributed or otherwise used for commercial purposes  All illustrations and images included in CareNotes® are the copyrighted property of [employer] M [employer] Inc  or [address] [place] [address]  The above information is an  only  It is not intended as medical advice for individual conditions or treatments  Talk to your doctor, nurse or pharmacist before following any medical regimen to see if it is safe and effective for you  Ureteral Stent Placement   WHAT YOU NEED TO KNOW:   Ureteral stent placement is a procedure to open a blocked or narrow ureter   The ureter is the tube that carries urine from your kidney into your bladder  A stent is a thin hollow plastic tube used to hold your ureter open and allow urine to flow  The stent may stay in for several weeks  Long-term stents will stay in longer and need to be replaced within a certain period of time  DISCHARGE INSTRUCTIONS:   Seek care immediately if:   · You urinate very little or not at all  · You have severe pain in your abdomen, even after you take medicine  · You have heavy bleeding from your urethra  · You see large blood clots in your urine, or your urine is bright red  Contact your healthcare provider or urologist if:   · You have a fever or chills  · You feel like you need to urinate very often  · Your symptoms get worse, or you develop new symptoms  · You have questions or concerns about your condition or care  Medicines: You may  need any of the following:  · Acetaminophen  decreases pain and fever  It is available without a doctor's order  Ask how much to take and how often to take it  Follow directions  Read the labels of all other medicines you are using to see if they also contain acetaminophen, or ask your doctor or pharmacist  Acetaminophen can cause liver damage if not taken correctly  Do not use more than 4 grams (4,000 milligrams) total of acetaminophen in one day  · Prescription pain medicine  may be given  Ask your healthcare provider how to take this medicine safely  Some prescription pain medicines contain acetaminophen  Do not take other medicines that contain acetaminophen without talking to your healthcare provider  Too much acetaminophen may cause liver damage  Prescription pain medicine may cause constipation  Ask your healthcare provider how to prevent or treat constipation  · Antibiotics  help prevent or treat bacterial infections  Your healthcare provider may prescribe these for you while you have a ureteral stent  · Take your medicine as directed    Contact your healthcare provider if you think your medicine is not helping or if you have side effects  Tell him or her if you are allergic to any medicine  Keep a list of the medicines, vitamins, and herbs you take  Include the amounts, and when and why you take them  Bring the list or the pill bottles to follow-up visits  Carry your medicine list with you in case of an emergency  Self-care:   · Drink liquids as directed  Liquids will help flush your urinary tract and prevent infection  Ask your healthcare provider how much liquid to drink each day and which liquids are best for you  · Ask when you can return to daily activities  You may be able to return to normal activities the day after your stent placement  Follow up with your urologist as directed: You will need regular follow-up visits with your urologist as long as you have a stent  He or she will check to make sure the stent is working properly  He or she will remove your temporary stent in several weeks  Your provider may do urine cultures to check for infection  Write down your questions so you remember to ask them during your visits  © Copyright [address] Information is for End User's use only and may not be sold, redistributed or otherwise used for commercial purposes  All illustrations and images included in CareNotes® are the copyrighted property of [employer] M , Inc  or [address] [place], [place] [address]ang   The above information is an  only  It is not intended as medical advice for individual conditions or treatments  Talk to your doctor, nurse or pharmacist before following any medical regimen to see if it is safe and effective for you

## 2021-02-28 NOTE — DISCHARGE INSTR - AVS FIRST PAGE
Follow up with urology  Use tylenol 650 mg every 4 hours as needed for pain  A prescription has been sent to pharmacy for oxycodone for pain unrelieved by Tylenol  Follow up with urology regarding further pain management  Take cipro for 5 more days twice daily  Call urology for fever or unrelieved pain

## 2021-02-28 NOTE — DISCHARGE SUMMARY
Discharge- Lizette Hart 1970, 44 y o  female MRN: [id number]    Unit/Bed#: [facility] 925-01 Encounter: [id number]    Primary Care Provider: Justice Hankins DO   Date and time admitted to hospital: 2/25/2021  9:32 PM        * Right ureteral stone  Assessment & Plan  status post cystoscopy and right ureteral stent placement  Will need definitive stone management in the future  Urology will follow outpatient     Acute cystitis with hematuria  Assessment & Plan  Enterococcus faecalis that is quinolone sensitive  Cipro p o  B i d  X5 more days    YONY (acute kidney injury) ([address] )  Assessment & Plan  Resolved    Acquired hypothyroidism  Assessment & Plan  Continue levothyroxine    Chronic kidney disease, stage 3  Assessment & Plan  Lab Results   Component Value Date    EGFR 51 02/28/2021    EGFR 44 02/27/2021    EGFR 39 02/26/2021    CREATININE 1 23 02/28/2021    CREATININE 1 37 (H) 02/27/2021    CREATININE 1 54 (H) 02/26/2021     Continue to monitor  Returned to baseline     Mild intermittent asthma without complication  Assessment & Plan  Continue Singulair and albuterol p r n  Along with Flovent    Chronic pain syndrome  Assessment & Plan  Patient has known history of fibromyalgia and chronic pain syndrome  Continue home regimen of Topamax, Neurontin      Sepsis [facility])  Assessment & Plan  POA, presented with fever and leukocytosis  Likely secondary to above  Patient has allergy to beta lactams - reports rash  Tolerating IV Ancef   F/u urine culture     Resolved 2/27      Discharging Physician / Practitioner: Nayan Saenz  PCP: Justice Hankins DO  Admission Date:   Admission Orders (From admission, onward)     Ordered        02/25/21 2256  INPATIENT ADMISSION  Once                   Discharge Date: 2/28/21    Resolved Problems  Date Reviewed: 2/26/2021          Resolved    Sepsis ([facility] [address] ) 3/1/2021     Resolved by  Author Atkins, [facility] Stay:  · Urology    Procedures Performed: 02/25/2021- cystoscopy retrograde pyelogram with insertion of right ureteral stent  Significant Findings / Test Results:   · Urine culture positive for Enterococcus faecalis that was quinolone susceptible  Severe hydronephrosis and proximal hydroureter nonobstructing 10 x 10 x 9 mm calculus in the proximal right ureter with obstructing calculus in the distal right ureter measuring 4 mm and nonobstructing 4 mm calculus in the upper pole calyx    Incidental Findings:   · None    Test Results Pending at Discharge (will require follow up): None   Outpatient Tests Requested:  · None    Complications:  None    Reason for Admission:  Obstructing ureteral stone with hydronephrosis    Hospital Course:     Rojas Carrillo is a 44 y o  female patient who originally presented to the hospital on 2/25/2021 due to right flank pain, transferred from outside facility for urology intervention  On 10/25 patient underwent cystoscopy with right ureteral stent placement  Patient was found to have sepsis Enterococcus faecalis urinary infection that was susceptible to quinolones  Patient has significant amount of pain postoperatively and developed ENOCH  Enoch and sepsis resolved at time of discharge  Patient will need urology staged interventions as outpatient  She will follow-up with them in the office  Please see above list of diagnoses and related plan for additional information  Condition at Discharge: good     Discharge Day Visit / Exam:     Subjective:  Pain improved, tolerating oral intake, urinating increased amounts    Hopeful for discharge to home  Vitals: Blood Pressure: 98/64 (02/28/21 0830)  Pulse: 71 (02/28/21 0826)  Temperature: 97 6 °F (36 4 °C) (02/28/21 0826)  Temp Source: Temporal (02/25/21 2300)  Respirations: 20 (02/28/21 0826)  Height: 5' 2" (157 5 cm) (02/25/21 2338)  Weight - Scale: 119 kg (263 lb 0 1 oz) (02/25/21 2338)  SpO2: 94 % (02/28/21 0826)  Exam:   Physical Exam  Vitals signs and nursing note reviewed  Constitutional:       Appearance: Normal appearance  She is normal weight  HENT:      Head: Normocephalic and atraumatic  Eyes:      Conjunctiva/sclera: Conjunctivae normal    Neck:      Musculoskeletal: Normal range of motion and neck supple  Cardiovascular:      Rate and Rhythm: Normal rate and regular rhythm  Pulses: Normal pulses  Heart sounds: Normal heart sounds  Pulmonary:      Effort: Pulmonary effort is normal       Breath sounds: Normal breath sounds  Abdominal:      General: Abdomen is flat  Palpations: Abdomen is soft  Tenderness: There is no abdominal tenderness  Musculoskeletal: Normal range of motion  Skin:     General: Skin is warm and dry  Capillary Refill: Capillary refill takes less than 2 seconds  Neurological:      General: No focal deficit present  Mental Status: She is alert and oriented to person, place, and time  Cranial Nerves: No cranial nerve deficit  Sensory: No sensory deficit  Motor: No weakness  Coordination: Coordination normal       Gait: Gait normal    Psychiatric:         Mood and Affect: Mood normal          Behavior: Behavior normal        Discussion with Family:  Patient only, declined discussion with family  Discharge instructions/Information to patient and family:   See after visit summary for information provided to patient and family  Provisions for Follow-Up Care:  See after visit summary for information related to follow-up care and any pertinent home health orders  Disposition:     Home    For Discharges to [facility] SNF:   · Not Applicable to this Patient - Not Applicable to this Patient    Planned Readmission:  None     Discharge Statement:  I spent 35 minutes discharging the patient  This time was spent on the day of discharge  I had direct contact with the patient on the day of discharge   Greater than 50% of the total time was spent examining patient, answering all patient questions, arranging and discussing plan of care with patient as well as directly providing post-discharge instructions  Additional time then spent on discharge activities  Discharge Medications:  See after visit summary for reconciled discharge medications provided to patient and family        ** Please Note: This note has been constructed using a voice recognition system **

## 2021-02-28 NOTE — PLAN OF CARE
Problem: Nutrition/Hydration-ADULT  Goal: Nutrient/Hydration intake appropriate for improving, restoring or maintaining nutritional needs  Description: Monitor and assess patient's nutrition/hydration status for malnutrition  Collaborate with interdisciplinary team and initiate plan and interventions as ordered  Monitor patient's weight and dietary intake as ordered or per policy  Utilize nutrition screening tool and intervene as necessary  Determine patient's food preferences and provide high-protein, high-caloric foods as appropriate       INTERVENTIONS:  - Monitor oral intake, urinary output, labs, and treatment plans  - Assess nutrition and hydration status and recommend course of action  - Evaluate amount of meals eaten  - Assist patient with eating if necessary   - Allow adequate time for meals  - Recommend/ encourage appropriate diets, oral nutritional supplements, and vitamin/mineral supplements  - Order, calculate, and assess calorie counts as needed  - Recommend, monitor, and adjust tube feedings and TPN/PPN based on assessed needs  - Assess need for intravenous fluids  - Provide specific nutrition/hydration education as appropriate  - Include patient/family/caregiver in decisions related to nutrition  Outcome: Completed     Problem: PAIN - ADULT  Goal: Verbalizes/displays adequate comfort level or baseline comfort level  Description: Interventions:  - Encourage patient to monitor pain and request assistance  - Assess pain using appropriate pain scale  - Administer analgesics based on type and severity of pain and evaluate response  - Implement non-pharmacological measures as appropriate and evaluate response  - Consider cultural and social influences on pain and pain management  - Notify physician/advanced practitioner if interventions unsuccessful or patient reports new pain  Outcome: Completed     Problem: INFECTION - ADULT  Goal: Absence or prevention of progression during hospitalization  Description: INTERVENTIONS:  - Assess and monitor for signs and symptoms of infection  - Monitor lab/diagnostic results  - Monitor all insertion sites, i e  indwelling lines, tubes, and drains  - Monitor endotracheal if appropriate and nasal secretions for changes in amount and color  - [place] appropriate cooling/warming therapies per order  - Administer medications as ordered  - Instruct and encourage patient and family to use good hand hygiene technique  - Identify and instruct in appropriate isolation precautions for identified infection/condition  Outcome: Completed  Goal: Absence of fever/infection during neutropenic period  Description: INTERVENTIONS:  - Monitor WBC    Outcome: Completed     Problem: SAFETY ADULT  Goal: Patient will remain free of falls  Description: INTERVENTIONS:  - Assess patient frequently for physical needs  -  Identify cognitive and physical deficits and behaviors that affect risk of falls    -  [place] fall precautions as indicated by assessment   - Educate patient/family on patient safety including physical limitations  - Instruct patient to call for assistance with activity based on assessment  - Modify environment to reduce risk of injury  - Consider OT/PT consult to assist with strengthening/mobility  Outcome: Completed  Goal: Maintain or return to baseline ADL function  Description: INTERVENTIONS:  -  Assess patient's ability to carry out ADLs; assess patient's baseline for ADL function and identify physical deficits which impact ability to perform ADLs (bathing, care of mouth/teeth, toileting, grooming, dressing, etc )  - Assess/evaluate cause of self-care deficits   - Assess range of motion  - Assess patient's mobility; develop plan if impaired  - Assess patient's need for assistive devices and provide as appropriate  - Encourage maximum independence but intervene and supervise when necessary  - Involve family in performance of ADLs  - Assess for home care needs following discharge   - Consider OT consult to assist with ADL evaluation and planning for discharge  - Provide patient education as appropriate  Outcome: Completed  Goal: Maintain or return mobility status to optimal level  Description: INTERVENTIONS:  - Assess patient's baseline mobility status (ambulation, transfers, stairs, etc )    - Identify cognitive and physical deficits and behaviors that affect mobility  - Identify mobility aids required to assist with transfers and/or ambulation (gait belt, sit-to-stand, lift, walker, cane, etc )  - [place] fall precautions as indicated by assessment  - Record patient progress and toleration of activity level on Mobility SBAR; progress patient to next Phase/Stage  - Instruct patient to call for assistance with activity based on assessment  - Consider rehabilitation consult to assist with strengthening/weightbearing, etc   Outcome: Completed     Problem: DISCHARGE PLANNING  Goal: Discharge to home or other facility with appropriate resources  Description: INTERVENTIONS:  - Identify barriers to discharge w/patient and caregiver  - Arrange for needed discharge resources and transportation as appropriate  - Identify discharge learning needs (meds, wound care, etc )  - Arrange for interpretive services to assist at discharge as needed  - Refer to Case Management Department for coordinating discharge planning if the patient needs post-hospital services based on physician/advanced practitioner order or complex needs related to functional status, cognitive ability, or social support system  Outcome: Completed     Problem: Knowledge Deficit  Goal: Patient/family/caregiver demonstrates understanding of disease process, treatment plan, medications, and discharge instructions  Description: Complete learning assessment and assess knowledge base    Interventions:  - Provide teaching at level of understanding  - Provide teaching via preferred learning methods  Outcome: Completed     Problem: Potential for Falls  Goal: Patient will remain free of falls  Description: INTERVENTIONS:  - Assess patient frequently for physical needs  -  Identify cognitive and physical deficits and behaviors that affect risk of falls    -  [place] fall precautions as indicated by assessment   - Educate patient/family on patient safety including physical limitations  - Instruct patient to call for assistance with activity based on assessment  - Modify environment to reduce risk of injury  - Consider OT/PT consult to assist with strengthening/mobility  Outcome: Completed

## 2021-03-01 PROBLEM — A41.9 SEPSIS (HCC): Status: RESOLVED | Noted: 2021-02-25 | Resolved: 2021-03-01

## 2021-03-01 RX ORDER — OXYCODONE HYDROCHLORIDE 5 MG/1
5 TABLET ORAL EVERY 4 HOURS PRN
Qty: 18 TABLET | Refills: 0 | Status: SHIPPED | OUTPATIENT
Start: 2021-03-01 | End: 2021-03-04

## 2021-03-01 RX ORDER — CIPROFLOXACIN 500 MG/1
500 TABLET, FILM COATED ORAL EVERY 12 HOURS SCHEDULED
Qty: 10 TABLET | Refills: 0 | Status: SHIPPED | OUTPATIENT
Start: 2021-03-01 | End: 2021-03-06

## 2021-03-01 NOTE — UTILIZATION REVIEW
Notification of Discharge  This is a Notification of Discharge from our facility [address]  Please be advised that this patient has been discharge from our facility  Below you will find the admission and discharge date and time including the patients disposition  PRESENTATION DATE: 2/25/2021  9:32 PM  OBS ADMISSION DATE:   IP ADMISSION DATE: 2/25/21 2132   DISCHARGE DATE: 2/28/2021  3:11 PM  DISPOSITION: Home/Self Care Home/Self Care   Admission Orders listed below:  Admission Orders (From admission, onward)     Ordered        02/25/21 2256  INPATIENT ADMISSION  Once                   Please contact the UR Department if additional information is required to close this patient's authorization/case  [address] Utilization Review Department  Main: [phone] x carefully listen to the prompts  All voicemails are confidential   [email or web address]  Send all requests for admission clinical reviews, approved or denied determinations and any other requests to dedicated fax number below belonging to the campus where the patient is receiving treatment   List of dedicated fax numbers:  [address] DENIALS (Administrative/Medical Necessity) [phone]   [address] (Maternity/NICU/Pediatrics) [phone]   [place] Notice [phone]   Park Husain [phone]   Serjio Coombs [phone]   [facility] [address] [place] [address] [phone]   [facility]  [phone]   [address] [facility] [address]  [address] [facility] [address] [facility] [phone]

## 2021-03-01 NOTE — TELEPHONE ENCOUNTER
lmom to make pt aware of follow up appointment and mailed an appointment card to patients address listed

## 2021-03-01 NOTE — ASSESSMENT & PLAN NOTE
status post cystoscopy and right ureteral stent placement    Will need definitive stone management in the future  Urology will follow outpatient

## 2021-03-02 NOTE — ASSESSMENT & PLAN NOTE
Lab Results   Component Value Date    EGFR 51 02/28/2021    EGFR 44 02/27/2021    EGFR 39 02/26/2021    CREATININE 1 23 02/28/2021    CREATININE 1 37 (H) 02/27/2021    CREATININE 1 54 (H) 02/26/2021     Continue to monitor  Returned to baseline

## 2021-03-03 LAB
BACTERIA BLD CULT: NORMAL
BACTERIA BLD CULT: NORMAL

## 2021-04-16 DIAGNOSIS — N20.0 CALCULUS OF KIDNEY: ICD-10-CM

## 2021-05-30 ENCOUNTER — APPOINTMENT (EMERGENCY)
Dept: RADIOLOGY | Facility: HOSPITAL | Age: 51
End: 2021-05-30
Payer: COMMERCIAL

## 2021-05-30 ENCOUNTER — HOSPITAL ENCOUNTER (EMERGENCY)
Facility: HOSPITAL | Age: 51
Discharge: HOME | End: 2021-05-30
Attending: EMERGENCY MEDICINE
Payer: COMMERCIAL

## 2021-05-30 VITALS
BODY MASS INDEX: 46.01 KG/M2 | RESPIRATION RATE: 16 BRPM | WEIGHT: 250 LBS | TEMPERATURE: 97.5 F | OXYGEN SATURATION: 97 % | SYSTOLIC BLOOD PRESSURE: 150 MMHG | DIASTOLIC BLOOD PRESSURE: 103 MMHG | HEART RATE: 87 BPM | HEIGHT: 62 IN

## 2021-05-30 DIAGNOSIS — M25.511 ACUTE PAIN OF RIGHT SHOULDER: Primary | ICD-10-CM

## 2021-05-30 PROCEDURE — 99283 EMERGENCY DEPT VISIT LOW MDM: CPT

## 2021-05-30 PROCEDURE — 99284 EMERGENCY DEPT VISIT MOD MDM: CPT | Performed by: EMERGENCY MEDICINE

## 2021-05-30 PROCEDURE — 73030 X-RAY EXAM OF SHOULDER: CPT

## 2021-05-30 RX ORDER — OXYCODONE HYDROCHLORIDE AND ACETAMINOPHEN 5; 325 MG/1; MG/1
1 TABLET ORAL ONCE
Status: COMPLETED | OUTPATIENT
Start: 2021-05-30 | End: 2021-05-30

## 2021-05-30 RX ADMIN — OXYCODONE HYDROCHLORIDE AND ACETAMINOPHEN 1 TABLET: 5; 325 TABLET ORAL at 14:05

## 2021-05-30 NOTE — ED PROVIDER NOTES
History  Chief Complaint   Patient presents with    Shoulder Pain     pt complains of right shoulder pain since thursday  States grandchild was jumping on her and since then she has been unable to move shoulder  Patient is a 78-year-old female presenting to the emergency department complaining of right shoulder pain times 4 days, states on Thursday her grandson jumped on her shoulder repeatedly, when she woke up Friday morning she had increased pain, feels a lump anteriorly and has pain with range of motion testing, occasionally she gets pins and needle sensation down the arm, denies any head injury or loss of consciousness, denies injury or pain elsewhere, has been taking gabapentin and ibuprofen at home with no relief of symptoms          Prior to Admission Medications   Prescriptions Last Dose Informant Patient Reported?  Taking?   acetaminophen (TYLENOL) 325 mg tablet   No No   Sig: Take 2 tablets (650 mg total) by mouth every 6 (six) hours as needed for mild pain, headaches or fever   albuterol (PROVENTIL HFA,VENTOLIN HFA) 90 mcg/act inhaler   Yes No   Sig: Inhale 2 puffs 3 (three) times a day as needed   fluticasone (ARNUITY ELLIPTA) 200 MCG/ACT AEPB inhaler   Yes No   Sig: Inhale 1 puff daily   gabapentin (NEURONTIN) 600 MG tablet  Self Yes No   Sig: Take 600 mg by mouth 2 (two) times a day   levothyroxine 25 mcg tablet   Yes No   Sig: Take 25 mcg by mouth daily   montelukast (SINGULAIR) 10 mg tablet   Yes No   Sig: Take 10 mg by mouth daily   pantoprazole (PROTONIX) 20 mg tablet  Self Yes No   Sig: Take 20 mg by mouth 2 (two) times a day   tamsulosin (FLOMAX) 0 4 mg   No No   Sig: Take 1 capsule (0 4 mg total) by mouth daily with dinner   Patient not taking: Reported on 2/25/2021   topiramate (TOPAMAX) 50 MG tablet   Yes No   Sig: Take 50 mg by mouth every 12 (twelve) hours      Facility-Administered Medications: None       Past Medical History:   Diagnosis Date    Arthritis     Asthma     COPD (chronic obstructive pulmonary disease) (HCC)     Fibromyalgia     Kidney stones        Past Surgical History:   Procedure Laterality Date    APPENDECTOMY      FL RETROGRADE PYELOGRAM  2/25/2021    JOINT REPLACEMENT      bilateral knee   LIPOMA RESECTION      CO CYSTOURETHROSCOPY,URETER CATHETER Right 2/25/2021    Procedure: CYSTOSCOPY RETROGRADE PYELOGRAM WITH INSERTION STENT URETERAL;  Surgeon: Yasmin Maher MD;  [facility]: [facility];  Service: Urology       Family History   Problem Relation Age of Onset    Hypertension Father      I have reviewed and agree with the history as documented  E-Cigarette/Vaping    E-Cigarette Use Never User      E-Cigarette/Vaping Substances     Social History     Tobacco Use    Smoking status: Current Every Day Smoker     Packs/day: 0 50    Smokeless tobacco: Never Used   Substance Use Topics    Alcohol use: Never     Frequency: Never    Drug use: Never       Review of Systems   Constitutional: Negative  HENT: Negative  Eyes: Negative  Respiratory: Negative  Cardiovascular: Negative  Gastrointestinal: Negative  Endocrine: Negative  Genitourinary: Negative  Musculoskeletal: Positive for arthralgias (Right shoulder pain)  Skin: Negative  Allergic/Immunologic: Negative  Neurological: Negative  Hematological: Negative  Psychiatric/Behavioral: Negative  Physical Exam  Physical Exam  Constitutional:       Appearance: She is well-developed  She is obese  HENT:      Head: Normocephalic and atraumatic  Eyes:      Conjunctiva/sclera: Conjunctivae normal       Pupils: Pupils are equal, round, and reactive to light  Neck:      Musculoskeletal: Normal range of motion and neck supple  Cardiovascular:      Rate and Rhythm: Normal rate  Pulmonary:      Effort: Pulmonary effort is normal    Abdominal:      Palpations: Abdomen is soft  Musculoskeletal: Normal range of motion          Arms:    Skin:     General: Skin is warm and dry  Neurological:      Mental Status: She is alert and oriented to person, place, and time  Vital Signs  ED Triage Vitals [05/30/21 1356]   Temperature Pulse Respirations Blood Pressure SpO2   97 5 °F (36 4 °C) 87 16 (!) 150/103 97 %      Temp Source Heart Rate Source Patient Position - Orthostatic VS BP Location FiO2 (%)   Temporal Monitor Sitting Left arm --      Pain Score       Worst Possible Pain           Vitals:    05/30/21 1356   BP: (!) 150/103   Pulse: 87   Patient Position - Orthostatic VS: Sitting               ED Medications  Medications   oxyCODONE-acetaminophen (PERCOCET) 5-325 mg per tablet 1 tablet (1 tablet Oral Given 5/30/21 1405)       Diagnostic Studies  Results Reviewed     None                 XR shoulder 2+ views RIGHT   ED Interpretation by Bobby Mcmullen DO (05/30 1430)   No acute findings                       ED Course  ED Course as of May 30 1442   Sun May 30, 2021   1430 Imaging findings discussed at bedside which are unremarkable, symptoms consistent with soft tissue strain, advised to ice and elevate, take over-the-counter anti-inflammatories, referral made for orthopedic follow-up as needed, patient placed in shoulder immobilizer as well, advised to return if symptoms worsen, patient acknowledges understanding and agreement with this plan                                SBIRT 22yo+      Most Recent Value   SBIRT (23 yo +)   In order to provide better care to our patients, we are screening all of our patients for alcohol and drug use  Would it be okay to ask you these screening questions?   Unable to answer at this time Filed at: 05/30/2021 1357                      Disposition  Final diagnoses:   Acute pain of right shoulder     Time reflects when diagnosis was documented in both MDM as applicable and the Disposition within this note     Time User Action Codes Description Comment    5/30/2021  2:37 PM Harry Romeo Add [O72 290] Acute pain of right shoulder       ED Disposition     ED Disposition Condition Date/Time Comment    Discharge Stable Sun May 30, 2021  2:37 PM Annmarie Robertson discharge to home/self care              Follow-up Information     Follow up With Specialties Details Why Contact Info    Jyoti Weiner DO Orthopedic Surgery In 1 week  [address] [facility]  [address]  [phone]            Patient's Medications   Discharge Prescriptions    No medications on file         PDMP Review       Value Time User    PDMP Reviewed  Yes 3/1/2021 10:48 AM KM Staley          ED Provider  Electronically Signed by           Carlos Urbano DO  05/30/21 9107

## 2021-08-20 ENCOUNTER — HOSPITAL ENCOUNTER (INPATIENT)
Facility: HOSPITAL | Age: 51
LOS: 1 days | Discharge: LEFT BEFORE DISCHARGE | DRG: 720 | End: 2021-08-21
Attending: EMERGENCY MEDICINE | Admitting: FAMILY MEDICINE
Payer: COMMERCIAL

## 2021-08-20 ENCOUNTER — APPOINTMENT (EMERGENCY)
Dept: CT IMAGING | Facility: HOSPITAL | Age: 51
DRG: 720 | End: 2021-08-20
Payer: COMMERCIAL

## 2021-08-20 DIAGNOSIS — A41.9 SEPSIS WITH ACUTE RENAL FAILURE WITHOUT SEPTIC SHOCK, DUE TO UNSPECIFIED ORGANISM, UNSPECIFIED ACUTE RENAL FAILURE TYPE (HCC): ICD-10-CM

## 2021-08-20 DIAGNOSIS — N17.9 AKI (ACUTE KIDNEY INJURY) (HCC): ICD-10-CM

## 2021-08-20 DIAGNOSIS — R65.20 SEPSIS WITH ACUTE RENAL FAILURE WITHOUT SEPTIC SHOCK, DUE TO UNSPECIFIED ORGANISM, UNSPECIFIED ACUTE RENAL FAILURE TYPE (HCC): ICD-10-CM

## 2021-08-20 DIAGNOSIS — N12 PYELONEPHRITIS OF LEFT KIDNEY: ICD-10-CM

## 2021-08-20 DIAGNOSIS — R50.9 FEVER: ICD-10-CM

## 2021-08-20 DIAGNOSIS — N39.0 URINARY TRACT INFECTION: ICD-10-CM

## 2021-08-20 DIAGNOSIS — D72.829 LEUKOCYTOSIS: ICD-10-CM

## 2021-08-20 DIAGNOSIS — N12 PYELONEPHRITIS: ICD-10-CM

## 2021-08-20 DIAGNOSIS — N17.9 SEPSIS WITH ACUTE RENAL FAILURE WITHOUT SEPTIC SHOCK, DUE TO UNSPECIFIED ORGANISM, UNSPECIFIED ACUTE RENAL FAILURE TYPE (HCC): ICD-10-CM

## 2021-08-20 DIAGNOSIS — N13.30 HYDRONEPHROSIS, LEFT: Primary | ICD-10-CM

## 2021-08-20 LAB
ALBUMIN SERPL BCP-MCNC: 3.4 G/DL (ref 3.5–5)
ALP SERPL-CCNC: 174 U/L (ref 46–116)
ALT SERPL W P-5'-P-CCNC: 47 U/L (ref 12–78)
ANION GAP SERPL CALCULATED.3IONS-SCNC: 14 MMOL/L (ref 4–13)
APTT PPP: 29 SECONDS (ref 23–37)
AST SERPL W P-5'-P-CCNC: 24 U/L (ref 5–45)
BACTERIA UR QL AUTO: ABNORMAL /HPF
BASOPHILS # BLD AUTO: 0.07 THOUSANDS/ΜL (ref 0–0.1)
BASOPHILS NFR BLD AUTO: 0 % (ref 0–1)
BILIRUB SERPL-MCNC: 0.7 MG/DL (ref 0.2–1)
BILIRUB UR QL STRIP: NEGATIVE
BUN SERPL-MCNC: 12 MG/DL (ref 5–25)
CALCIUM ALBUM COR SERPL-MCNC: 9.7 MG/DL (ref 8.3–10.1)
CALCIUM SERPL-MCNC: 9.2 MG/DL (ref 8.3–10.1)
CHLORIDE SERPL-SCNC: 100 MMOL/L (ref 100–108)
CLARITY UR: ABNORMAL
CO2 SERPL-SCNC: 22 MMOL/L (ref 21–32)
COLOR UR: YELLOW
CREAT SERPL-MCNC: 1.34 MG/DL (ref 0.6–1.3)
EOSINOPHIL # BLD AUTO: 0.23 THOUSAND/ΜL (ref 0–0.61)
EOSINOPHIL NFR BLD AUTO: 1 % (ref 0–6)
ERYTHROCYTE [DISTWIDTH] IN BLOOD BY AUTOMATED COUNT: 12.5 % (ref 11.6–15.1)
GFR SERPL CREATININE-BSD FRML MDRD: 45 ML/MIN/1.73SQ M
GLUCOSE SERPL-MCNC: 122 MG/DL (ref 65–140)
GLUCOSE UR STRIP-MCNC: NEGATIVE MG/DL
HCT VFR BLD AUTO: 49.7 % (ref 34.8–46.1)
HGB BLD-MCNC: 16.8 G/DL (ref 11.5–15.4)
HGB UR QL STRIP.AUTO: ABNORMAL
IMM GRANULOCYTES # BLD AUTO: 0.08 THOUSAND/UL (ref 0–0.2)
IMM GRANULOCYTES NFR BLD AUTO: 0 % (ref 0–2)
INR PPP: 0.94 (ref 0.84–1.19)
KETONES UR STRIP-MCNC: NEGATIVE MG/DL
LACTATE SERPL-SCNC: 1.2 MMOL/L (ref 0.5–2)
LEUKOCYTE ESTERASE UR QL STRIP: ABNORMAL
LIPASE SERPL-CCNC: 75 U/L (ref 73–393)
LYMPHOCYTES # BLD AUTO: 2.22 THOUSANDS/ΜL (ref 0.6–4.47)
LYMPHOCYTES NFR BLD AUTO: 12 % (ref 14–44)
MCH RBC QN AUTO: 29.3 PG (ref 26.8–34.3)
MCHC RBC AUTO-ENTMCNC: 33.8 G/DL (ref 31.4–37.4)
MCV RBC AUTO: 87 FL (ref 82–98)
MONOCYTES # BLD AUTO: 1.46 THOUSAND/ΜL (ref 0.17–1.22)
MONOCYTES NFR BLD AUTO: 8 % (ref 4–12)
NEUTROPHILS # BLD AUTO: 14.13 THOUSANDS/ΜL (ref 1.85–7.62)
NEUTS SEG NFR BLD AUTO: 79 % (ref 43–75)
NITRITE UR QL STRIP: POSITIVE
NON-SQ EPI CELLS URNS QL MICRO: ABNORMAL /HPF
NRBC BLD AUTO-RTO: 0 /100 WBCS
PH UR STRIP.AUTO: 6 [PH]
PLATELET # BLD AUTO: 300 THOUSANDS/UL (ref 149–390)
PMV BLD AUTO: 11.9 FL (ref 8.9–12.7)
POTASSIUM SERPL-SCNC: 4.2 MMOL/L (ref 3.5–5.3)
PROT SERPL-MCNC: 7.9 G/DL (ref 6.4–8.2)
PROT UR STRIP-MCNC: ABNORMAL MG/DL
PROTHROMBIN TIME: 12.4 SECONDS (ref 11.6–14.5)
RBC # BLD AUTO: 5.74 MILLION/UL (ref 3.81–5.12)
RBC #/AREA URNS AUTO: ABNORMAL /HPF
SODIUM SERPL-SCNC: 136 MMOL/L (ref 136–145)
SP GR UR STRIP.AUTO: 1.01 (ref 1–1.03)
UROBILINOGEN UR QL STRIP.AUTO: 0.2 E.U./DL
WBC # BLD AUTO: 18.19 THOUSAND/UL (ref 4.31–10.16)
WBC #/AREA URNS AUTO: ABNORMAL /HPF

## 2021-08-20 PROCEDURE — 99285 EMERGENCY DEPT VISIT HI MDM: CPT

## 2021-08-20 PROCEDURE — 83605 ASSAY OF LACTIC ACID: CPT | Performed by: EMERGENCY MEDICINE

## 2021-08-20 PROCEDURE — 83690 ASSAY OF LIPASE: CPT | Performed by: EMERGENCY MEDICINE

## 2021-08-20 PROCEDURE — 87040 BLOOD CULTURE FOR BACTERIA: CPT | Performed by: EMERGENCY MEDICINE

## 2021-08-20 PROCEDURE — 85730 THROMBOPLASTIN TIME PARTIAL: CPT | Performed by: EMERGENCY MEDICINE

## 2021-08-20 PROCEDURE — 96374 THER/PROPH/DIAG INJ IV PUSH: CPT

## 2021-08-20 PROCEDURE — 85025 COMPLETE CBC W/AUTO DIFF WBC: CPT | Performed by: EMERGENCY MEDICINE

## 2021-08-20 PROCEDURE — 81001 URINALYSIS AUTO W/SCOPE: CPT | Performed by: EMERGENCY MEDICINE

## 2021-08-20 PROCEDURE — 84145 PROCALCITONIN (PCT): CPT | Performed by: NURSE PRACTITIONER

## 2021-08-20 PROCEDURE — 74176 CT ABD & PELVIS W/O CONTRAST: CPT

## 2021-08-20 PROCEDURE — 99285 EMERGENCY DEPT VISIT HI MDM: CPT | Performed by: EMERGENCY MEDICINE

## 2021-08-20 PROCEDURE — 85610 PROTHROMBIN TIME: CPT | Performed by: EMERGENCY MEDICINE

## 2021-08-20 PROCEDURE — 87086 URINE CULTURE/COLONY COUNT: CPT | Performed by: EMERGENCY MEDICINE

## 2021-08-20 PROCEDURE — 87186 SC STD MICRODIL/AGAR DIL: CPT | Performed by: EMERGENCY MEDICINE

## 2021-08-20 PROCEDURE — 96361 HYDRATE IV INFUSION ADD-ON: CPT

## 2021-08-20 PROCEDURE — 36415 COLL VENOUS BLD VENIPUNCTURE: CPT | Performed by: EMERGENCY MEDICINE

## 2021-08-20 PROCEDURE — 80053 COMPREHEN METABOLIC PANEL: CPT | Performed by: EMERGENCY MEDICINE

## 2021-08-20 PROCEDURE — 96375 TX/PRO/DX INJ NEW DRUG ADDON: CPT

## 2021-08-20 PROCEDURE — 99223 1ST HOSP IP/OBS HIGH 75: CPT | Performed by: NURSE PRACTITIONER

## 2021-08-20 PROCEDURE — 87077 CULTURE AEROBIC IDENTIFY: CPT | Performed by: EMERGENCY MEDICINE

## 2021-08-20 RX ORDER — HYDROMORPHONE HCL/PF 1 MG/ML
1 SYRINGE (ML) INJECTION ONCE
Status: COMPLETED | OUTPATIENT
Start: 2021-08-20 | End: 2021-08-20

## 2021-08-20 RX ORDER — SODIUM CHLORIDE 9 MG/ML
150 INJECTION, SOLUTION INTRAVENOUS CONTINUOUS
Status: DISCONTINUED | OUTPATIENT
Start: 2021-08-20 | End: 2021-08-21 | Stop reason: HOSPADM

## 2021-08-20 RX ORDER — MORPHINE SULFATE 4 MG/ML
4 INJECTION, SOLUTION INTRAMUSCULAR; INTRAVENOUS ONCE
Status: COMPLETED | OUTPATIENT
Start: 2021-08-20 | End: 2021-08-20

## 2021-08-20 RX ORDER — ACETAMINOPHEN 325 MG/1
975 TABLET ORAL ONCE
Status: COMPLETED | OUTPATIENT
Start: 2021-08-20 | End: 2021-08-20

## 2021-08-20 RX ORDER — CEFTRIAXONE 1 G/50ML
1000 INJECTION, SOLUTION INTRAVENOUS EVERY 24 HOURS
Status: DISCONTINUED | OUTPATIENT
Start: 2021-08-20 | End: 2021-08-21 | Stop reason: HOSPADM

## 2021-08-20 RX ORDER — ONDANSETRON 2 MG/ML
4 INJECTION INTRAMUSCULAR; INTRAVENOUS ONCE
Status: COMPLETED | OUTPATIENT
Start: 2021-08-20 | End: 2021-08-20

## 2021-08-20 RX ORDER — ACETAMINOPHEN 325 MG/1
650 TABLET ORAL EVERY 6 HOURS PRN
Status: DISCONTINUED | OUTPATIENT
Start: 2021-08-20 | End: 2021-08-21 | Stop reason: HOSPADM

## 2021-08-20 RX ORDER — ONDANSETRON 2 MG/ML
4 INJECTION INTRAMUSCULAR; INTRAVENOUS EVERY 8 HOURS PRN
Status: DISCONTINUED | OUTPATIENT
Start: 2021-08-20 | End: 2021-08-21 | Stop reason: HOSPADM

## 2021-08-20 RX ORDER — OXYCODONE HYDROCHLORIDE 5 MG/1
5 TABLET ORAL EVERY 6 HOURS PRN
Status: DISCONTINUED | OUTPATIENT
Start: 2021-08-20 | End: 2021-08-21 | Stop reason: HOSPADM

## 2021-08-20 RX ORDER — CIPROFLOXACIN 500 MG/1
500 TABLET, FILM COATED ORAL ONCE
Status: COMPLETED | OUTPATIENT
Start: 2021-08-20 | End: 2021-08-20

## 2021-08-20 RX ADMIN — SODIUM CHLORIDE 150 ML/HR: 0.9 INJECTION, SOLUTION INTRAVENOUS at 22:51

## 2021-08-20 RX ADMIN — CIPROFLOXACIN HYDROCHLORIDE 500 MG: 500 TABLET, FILM COATED ORAL at 20:24

## 2021-08-20 RX ADMIN — ONDANSETRON 4 MG: 2 INJECTION INTRAMUSCULAR; INTRAVENOUS at 19:37

## 2021-08-20 RX ADMIN — SODIUM CHLORIDE 1000 ML: 0.9 INJECTION, SOLUTION INTRAVENOUS at 19:37

## 2021-08-20 RX ADMIN — MORPHINE SULFATE 4 MG: 4 INJECTION INTRAVENOUS at 19:37

## 2021-08-20 RX ADMIN — SODIUM CHLORIDE, SODIUM LACTATE, POTASSIUM CHLORIDE, AND CALCIUM CHLORIDE 1000 ML: .6; .31; .03; .02 INJECTION, SOLUTION INTRAVENOUS at 21:27

## 2021-08-20 RX ADMIN — HYDROMORPHONE HYDROCHLORIDE 1 MG: 1 INJECTION, SOLUTION INTRAMUSCULAR; INTRAVENOUS; SUBCUTANEOUS at 20:23

## 2021-08-20 RX ADMIN — CEFTRIAXONE 1000 MG: 1 INJECTION, SOLUTION INTRAVENOUS at 22:13

## 2021-08-20 RX ADMIN — ACETAMINOPHEN 975 MG: 325 TABLET ORAL at 20:24

## 2021-08-20 NOTE — ED PROVIDER NOTES
History  Chief Complaint   Patient presents with    Flank Pain     Blood in urine on Monday - Left flank pain starting wednesday - hx of kidney stones     Patient is a 24-year-old female with extensive prior surgical history including tubal ligation appendectomy cholecystectomy oophorectomy prior kidney stones and cystoscopy and ureteral stenting presents the emergency department complaining of left flank and left abdominal pain started about 3 days ago associated with nausea no vomiting no diarrhea no fevers or chills  Patient reports blood in the urine  History provided by:  Patient and EMS personnel  Flank Pain  Pain location:  L flank  Pain quality: aching and cramping    Pain radiates to:  LUQ and LLQ  Pain severity:  Severe  Onset quality:  Gradual  Duration:  3 days  Timing:  Intermittent  Progression:  Worsening  Chronicity:  New  Associated symptoms: hematuria and nausea    Associated symptoms: no chest pain, no chills, no constipation, no cough, no diarrhea, no dysuria, no fatigue, no fever, no shortness of breath, no sore throat and no vomiting        Prior to Admission Medications   Prescriptions Last Dose Informant Patient Reported?  Taking?   acetaminophen (TYLENOL) 325 mg tablet   No No   Sig: Take 2 tablets (650 mg total) by mouth every 6 (six) hours as needed for mild pain, headaches or fever   albuterol (PROVENTIL HFA,VENTOLIN HFA) 90 mcg/act inhaler   Yes No   Sig: Inhale 2 puffs 3 (three) times a day as needed   fluticasone (ARNUITY ELLIPTA) 200 MCG/ACT AEPB inhaler   Yes No   Sig: Inhale 1 puff daily   gabapentin (NEURONTIN) 600 MG tablet  Self Yes No   Sig: Take 600 mg by mouth 2 (two) times a day   levothyroxine 25 mcg tablet   Yes No   Sig: Take 25 mcg by mouth daily   montelukast (SINGULAIR) 10 mg tablet   Yes No   Sig: Take 10 mg by mouth daily   pantoprazole (PROTONIX) 20 mg tablet  Self Yes No   Sig: Take 20 mg by mouth 2 (two) times a day   tamsulosin (FLOMAX) 0 4 mg   No No Sig: Take 1 capsule (0 4 mg total) by mouth daily with dinner   Patient not taking: Reported on 2/25/2021   topiramate (TOPAMAX) 50 MG tablet   Yes No   Sig: Take 50 mg by mouth every 12 (twelve) hours      Facility-Administered Medications: None       Past Medical History:   Diagnosis Date    Arthritis     Asthma     [facility]ch[facility] obstructive pulmonary disease) ([facility] [address] )     Fibromyalgia     Kidney stones        Past Surgical History:   Procedure Laterality Date    APPENDECTOMY      FL RETROGRADE PYELOGRAM  2/25/2021    JOINT REPLACEMENT      bilateral knee   LIPOMA RESECTION      MA CYSTOURETHROSCOPY,URETER CATHETER Right 2/25/2021    Procedure: CYSTOSCOPY RETROGRADE PYELOGRAM WITH INSERTION STENT URETERAL;  Surgeon: Debbie Nguyen MD;  Location: [facility];  Service: Urology       Family History   Problem Relation Age of Onset    Hypertension Father      I have reviewed and agree with the history as documented  E-Cigarette/Vaping    E-Cigarette Use Never User      E-Cigarette/Vaping Substances     Social History     Tobacco Use    Smoking status: Current Every Day Smoker     Packs/day: 0 50    Smokeless tobacco: Never Used   Vaping Use    Vaping Use: Never used   Substance Use Topics    Alcohol use: Never    Drug use: Never       Review of Systems   Constitutional: Negative for activity change, appetite change, chills, fatigue and fever  HENT: Negative for congestion, ear pain, rhinorrhea and sore throat  Eyes: Negative for discharge, redness and visual disturbance  Respiratory: Negative for cough, chest tightness, shortness of breath and wheezing  Cardiovascular: Negative for chest pain and palpitations  Gastrointestinal: Positive for abdominal pain and nausea  Negative for constipation, diarrhea and vomiting  Endocrine: Negative for polydipsia and polyuria  Genitourinary: Positive for flank pain and hematuria   Negative for difficulty urinating, dysuria, frequency and urgency  Musculoskeletal: Negative for arthralgias and myalgias  Skin: Negative for color change, pallor and rash  Neurological: Negative for dizziness, weakness, light-headedness, numbness and headaches  Hematological: Negative for adenopathy  Does not bruise/bleed easily  All other systems reviewed and are negative  Physical Exam  Physical Exam  Vitals and nursing note reviewed  Constitutional:       Appearance: She is well-developed  HENT:      Head: Normocephalic and atraumatic  Right Ear: External ear normal       Left Ear: External ear normal       Nose: Nose normal    Eyes:      Conjunctiva/sclera: Conjunctivae normal       Pupils: Pupils are equal, round, and reactive to light  Cardiovascular:      Rate and Rhythm: Normal rate and regular rhythm  Heart sounds: Normal heart sounds  Pulmonary:      Effort: Pulmonary effort is normal  No respiratory distress  Breath sounds: Normal breath sounds  No wheezing or rales  Chest:      Chest wall: No tenderness  Abdominal:      General: Bowel sounds are normal  There is no distension  Palpations: Abdomen is soft  Tenderness: There is abdominal tenderness in the left upper quadrant and left lower quadrant  There is left CVA tenderness  There is no guarding or rebound  Musculoskeletal:         General: Normal range of motion  Cervical back: Normal range of motion and neck supple  Skin:     General: Skin is warm and dry  Neurological:      Mental Status: She is alert and oriented to person, place, and time  Cranial Nerves: No cranial nerve deficit  Sensory: No sensory deficit           Vital Signs  ED Triage Vitals [08/20/21 1931]   Temperature Pulse Respirations Blood Pressure SpO2   (!) 101 8 °F (38 8 °C) (!) 110 20 (!) 151/108 99 %      Temp Source Heart Rate Source Patient Position - Orthostatic VS BP Location FiO2 (%)   Oral -- Sitting Right arm --      Pain Score       8           Vitals: 08/20/21 1931 08/20/21 2000   BP: (!) 151/108 128/77   Pulse: (!) 110    Patient Position - Orthostatic VS: Sitting          Visual Acuity      ED Medications  Medications   sodium chloride 0 9 % infusion (has no administration in time range)   morphine (PF) 4 mg/mL injection 4 mg (4 mg Intravenous Given 8/20/21 1937)   ondansetron (ZOFRAN) injection 4 mg (4 mg Intravenous Given 8/20/21 1937)   sodium chloride 0 9 % bolus 1,000 mL (1,000 mL Intravenous New Bag 8/20/21 1937)   HYDROmorphone (DILAUDID) injection 1 mg (1 mg Intravenous Given 8/20/21 2023)   ciprofloxacin (CIPRO) tablet 500 mg (500 mg Oral Given 8/20/21 2024)   acetaminophen (TYLENOL) tablet 975 mg (975 mg Oral Given 8/20/21 2024)       Diagnostic Studies  Results Reviewed     Procedure Component Value Units Date/Time    Lactic acid [[id number]]  (Normal) Collected: 08/20/21 2000    Lab Status: Final result Specimen: Blood from Arm, Left Updated: 08/20/21 2033     LACTIC ACID 1 2 mmol/L     Narrative:      Result may be elevated if tourniquet was used during collection  Blood culture #2 [[id number]] Collected: 08/20/21 2000    Lab Status: In process Specimen: Blood from Arm, Left Updated: 08/20/21 2013    Blood culture #1 [[id number]] Collected: 08/20/21 2000    Lab Status:  In process Specimen: Blood from Arm, Right Updated: 08/20/21 2013    Comprehensive metabolic panel [[id number]]  (Abnormal) Collected: 08/20/21 1934    Lab Status: Final result Specimen: Blood from Arm, Right Updated: 08/20/21 1959     Sodium 136 mmol/L      Potassium 4 2 mmol/L      Chloride 100 mmol/L      CO2 22 mmol/L      ANION GAP 14 mmol/L      BUN 12 mg/dL      Creatinine 1 34 mg/dL      Glucose 122 mg/dL      Calcium 9 2 mg/dL      Corrected Calcium 9 7 mg/dL      AST 24 U/L      ALT 47 U/L      Alkaline Phosphatase 174 U/L      Total Protein 7 9 g/dL      Albumin 3 4 g/dL      Total Bilirubin 0 70 mg/dL      eGFR 45 ml/min/1 73sq m     Narrative:      National Kidney Disease Foundation guidelines for Chronic Kidney Disease (CKD):     Stage 1 with normal or high GFR (GFR > 90 mL/min/1 73 square meters)    Stage 2 Mild CKD (GFR = 60-89 mL/min/1 73 square meters)    Stage 3A Moderate CKD (GFR = 45-59 mL/min/1 73 square meters)    Stage 3B Moderate CKD (GFR = 30-44 mL/min/1 73 square meters)    Stage 4 Severe CKD (GFR = 15-29 mL/min/1 73 square meters)    Stage 5 End Stage CKD (GFR <15 mL/min/1 73 square meters)  Note: GFR calculation is accurate only with a steady state creatinine    Lipase [[id number]]  (Normal) Collected: 08/20/21 1934    Lab Status: Final result Specimen: Blood from Arm, Right Updated: 08/20/21 1959     Lipase 75 u/L     Urine Microscopic [[id number]]  (Abnormal) Collected: 08/20/21 1937    Lab Status: Final result Specimen: Urine, Clean Catch Updated: 08/20/21 1957     RBC, UA       Field obscured, unable to enumerate     /hpf     WBC, UA Innumerable /hpf      Epithelial Cells None Seen /hpf      Bacteria, UA Moderate /hpf     Urine culture [[id number]] Collected: 08/20/21 1937    Lab Status:  In process Specimen: Urine, Clean Catch Updated: 08/20/21 1957    Protime-INR [[id number]]  (Normal) Collected: 08/20/21 1934    Lab Status: Final result Specimen: Blood from Arm, Right Updated: 08/20/21 1953     Protime 12 4 seconds      INR 0 94    APTT [[id number]]  (Normal) Collected: 08/20/21 1934    Lab Status: Final result Specimen: Blood from Arm, Right Updated: 08/20/21 1953     PTT 29 seconds     UA w Reflex to Microscopic w Reflex to Culture [[id number]]  (Abnormal) Collected: 08/20/21 1937    Lab Status: Final result Specimen: Urine, Clean Catch Updated: 08/20/21 1950     Color, UA Yellow     Clarity, UA Turbid     Specific [place], UA 1 010     pH, UA 6 0     Leukocytes, UA Large     Nitrite, UA Positive     Protein,  (2+) mg/dl      Glucose, UA Negative mg/dl      Ketones, UA Negative mg/dl      Urobilinogen, UA 0 2 E U /dl      Bilirubin, UA Negative Blood, UA Large    CBC and differential [[id number]]  (Abnormal) Collected: 08/20/21 1934    Lab Status: Final result Specimen: Blood from Arm, Right Updated: 08/20/21 1940     WBC 18 19 Thousand/uL      RBC 5 74 Million/uL      Hemoglobin 16 8 g/dL      Hematocrit 49 7 %      MCV 87 fL      MCH 29 3 pg      MCHC 33 8 g/dL      RDW 12 5 %      MPV 11 9 fL      Platelets 331 Thousands/uL      nRBC 0 /100 WBCs      Neutrophils Relative 79 %      Immat GRANS % 0 %      Lymphocytes Relative 12 %      Monocytes Relative 8 %      Eosinophils Relative 1 %      Basophils Relative 0 %      Neutrophils Absolute 14 13 Thousands/µL      Immature Grans Absolute 0 08 Thousand/uL      Lymphocytes Absolute 2 22 Thousands/µL      Monocytes Absolute 1 46 Thousand/µL      Eosinophils Absolute 0 23 Thousand/µL      Basophils Absolute 0 07 Thousands/µL                  CT renal stone study abdomen pelvis wo contrast   Final Result by Lluvia Bhatt MD (08/20 2038)      Stranding around the left renal hilum with prominent ureter  No calculus identified  This could be secondary to a passed stone  Workstation performed: [id number]                    Procedures  Procedures         ED Course  ED Course as of Aug 20 2059   Fri Aug 20, 2021   2032 Spoke with Dr Escobar Costello on-call for Urology reviewed case and findings in the emergency department given no obstructing calculus present likely recently passed stone with residual hydronephrosis now and urinary tract infection/pyelonephritis recommends patient may be observed here for now and does not need transfer for ureteral stenting tonight  2056 Patient noted to have fever nitrate positive urinary tract infection and hydronephrosis on the left kidney likely secondary to recently passed ureterolithiasis    Initially I suspected that a phleboliths in the left lower pelvis was a obstructing calculus and I initiated process for transfer for ureteral stenting as it was suspected that patient would require this with infected ureteral calculus however official radiology read showed no obstructing calculus this was reviewed with Urology and they recommend that patient may stay here rather than having immediate transfer for ureteral stenting tonight and rather be observed and treated with IV antibiotics and fluids  Patient has normal lactate no severe sepsis or septic shock present was treated with IV antibiotics fluids and antibiotics and antipyretics in the ED  Referred to the hospitalist for further evaluation monitoring and treatment          2058 Spoke with hospitalist nurse practitioner on-call [place] reviewed case and findings in the emergency department urology recommendations and management thus far she accepts for admission on behalf Dr Aleida Watson                                                 MDM  Number of Diagnoses or Management Options  YONY (acute kidney injury) [facility]): new and requires workup  Fever: new and requires workup  Hydronephrosis, left: new and requires workup  Leukocytosis: new and requires workup  Urinary tract infection: new and requires workup     Amount and/or Complexity of Data Reviewed  Clinical lab tests: reviewed and ordered  Tests in the radiology section of CPT®: ordered and reviewed  Tests in the medicine section of CPT®: ordered and reviewed  Decide to obtain previous medical records or to obtain history from someone other than the patient: yes  Review and summarize past medical records: yes  Independent visualization of images, tracings, or specimens: yes    Risk of Complications, Morbidity, and/or Mortality  Presenting problems: moderate  Diagnostic procedures: moderate  Management options: moderate    Patient Progress  Patient progress: stable      Disposition  Final diagnoses:   Hydronephrosis, left   Urinary tract infection   Fever   Leukocytosis   YONY (acute kidney injury) ([facility] [address] )   Pyelonephritis     Time reflects when diagnosis was documented in both MDM as applicable and the Disposition within this note     Time User Action Codes Description Comment    8/20/2021  8:15 PM Nisreen Jerald [N20 1] Ureterolithiasis     8/20/2021  8:15 PM Remaley Megan Pillar Add [N13 30] Hydronephrosis, left     8/20/2021  8:15 PM Feliberto Barreto Add [N39 0] Urinary tract infection     8/20/2021  8:15 PM Mattie PaciniFeliberto Add [R50 9] Fever     8/20/2021  8:15 PM Mark Dolphin Add [U49 092] Leukocytosis     8/20/2021  8:16 PM Remaleanila Megan Pillar Add [N17 9] YONY (acute kidney injury) ([facility] [address] )     8/20/2021  8:46 PM Mark Dolphin Modify [N13 30] Hydronephrosis, left     8/20/2021  8:46 PM Mark Dolphin Remove [N20 1] Ureterolithiasis     8/20/2021  8:59 PM Mark Dolphin Add [N12] Pyelonephritis       ED Disposition     ED Disposition Condition Date/Time Comment    Admit Stable Fri Aug 20, 2021  8:47 PM Spoke with [facility] hospitalist nurse practitioner on-call accepts for admission on behalf of Dr Chantel Ruiz    None         Patient's Medications   Discharge Prescriptions    No medications on file     No discharge procedures on file      PDMP Review       Value Time User    PDMP Reviewed  Yes 3/1/2021 10:48 AM KM Grady          ED Provider  Electronically Signed by           Sabina Dow DO  08/20/21 2059

## 2021-08-21 VITALS
HEART RATE: 72 BPM | RESPIRATION RATE: 20 BRPM | HEIGHT: 62 IN | DIASTOLIC BLOOD PRESSURE: 77 MMHG | OXYGEN SATURATION: 98 % | WEIGHT: 248.8 LBS | BODY MASS INDEX: 45.78 KG/M2 | TEMPERATURE: 98 F | SYSTOLIC BLOOD PRESSURE: 109 MMHG

## 2021-08-21 PROBLEM — A41.9 SEPSIS WITH ACUTE RENAL FAILURE WITHOUT SEPTIC SHOCK (HCC): Status: ACTIVE | Noted: 2021-08-21

## 2021-08-21 PROBLEM — N17.9 SEPSIS WITH ACUTE RENAL FAILURE WITHOUT SEPTIC SHOCK (HCC): Status: ACTIVE | Noted: 2021-08-21

## 2021-08-21 PROBLEM — R65.20 SEPSIS WITH ACUTE RENAL FAILURE WITHOUT SEPTIC SHOCK (HCC): Status: ACTIVE | Noted: 2021-08-21

## 2021-08-21 LAB
ALBUMIN SERPL BCP-MCNC: 2.1 G/DL (ref 3.5–5)
ALP SERPL-CCNC: 115 U/L (ref 46–116)
ALT SERPL W P-5'-P-CCNC: 29 U/L (ref 12–78)
ANION GAP SERPL CALCULATED.3IONS-SCNC: 8 MMOL/L (ref 4–13)
AST SERPL W P-5'-P-CCNC: 17 U/L (ref 5–45)
BASOPHILS # BLD AUTO: 0.06 THOUSANDS/ΜL (ref 0–0.1)
BASOPHILS NFR BLD AUTO: 1 % (ref 0–1)
BILIRUB SERPL-MCNC: 0.41 MG/DL (ref 0.2–1)
BUN SERPL-MCNC: 11 MG/DL (ref 5–25)
CALCIUM ALBUM COR SERPL-MCNC: 9.2 MG/DL (ref 8.3–10.1)
CALCIUM SERPL-MCNC: 7.7 MG/DL (ref 8.3–10.1)
CHLORIDE SERPL-SCNC: 110 MMOL/L (ref 100–108)
CO2 SERPL-SCNC: 24 MMOL/L (ref 21–32)
CREAT SERPL-MCNC: 1.18 MG/DL (ref 0.6–1.3)
EOSINOPHIL # BLD AUTO: 0.29 THOUSAND/ΜL (ref 0–0.61)
EOSINOPHIL NFR BLD AUTO: 2 % (ref 0–6)
ERYTHROCYTE [DISTWIDTH] IN BLOOD BY AUTOMATED COUNT: 12.5 % (ref 11.6–15.1)
GFR SERPL CREATININE-BSD FRML MDRD: 53 ML/MIN/1.73SQ M
GLUCOSE SERPL-MCNC: 85 MG/DL (ref 65–140)
HCT VFR BLD AUTO: 42.9 % (ref 34.8–46.1)
HGB BLD-MCNC: 14.1 G/DL (ref 11.5–15.4)
IMM GRANULOCYTES # BLD AUTO: 0.08 THOUSAND/UL (ref 0–0.2)
IMM GRANULOCYTES NFR BLD AUTO: 1 % (ref 0–2)
LYMPHOCYTES # BLD AUTO: 2.25 THOUSANDS/ΜL (ref 0.6–4.47)
LYMPHOCYTES NFR BLD AUTO: 17 % (ref 14–44)
MAGNESIUM SERPL-MCNC: 1.8 MG/DL (ref 1.6–2.6)
MCH RBC QN AUTO: 29.1 PG (ref 26.8–34.3)
MCHC RBC AUTO-ENTMCNC: 32.9 G/DL (ref 31.4–37.4)
MCV RBC AUTO: 89 FL (ref 82–98)
MONOCYTES # BLD AUTO: 1.26 THOUSAND/ΜL (ref 0.17–1.22)
MONOCYTES NFR BLD AUTO: 10 % (ref 4–12)
NEUTROPHILS # BLD AUTO: 9.22 THOUSANDS/ΜL (ref 1.85–7.62)
NEUTS SEG NFR BLD AUTO: 69 % (ref 43–75)
NRBC BLD AUTO-RTO: 0 /100 WBCS
PLATELET # BLD AUTO: 198 THOUSANDS/UL (ref 149–390)
PMV BLD AUTO: 11.8 FL (ref 8.9–12.7)
POTASSIUM SERPL-SCNC: 3.7 MMOL/L (ref 3.5–5.3)
PROCALCITONIN SERPL-MCNC: 0.06 NG/ML
PROT SERPL-MCNC: 5.4 G/DL (ref 6.4–8.2)
RBC # BLD AUTO: 4.85 MILLION/UL (ref 3.81–5.12)
SODIUM SERPL-SCNC: 142 MMOL/L (ref 136–145)
TSH SERPL DL<=0.05 MIU/L-ACNC: 2.4 UIU/ML (ref 0.36–3.74)
WBC # BLD AUTO: 13.16 THOUSAND/UL (ref 4.31–10.16)

## 2021-08-21 PROCEDURE — 94760 N-INVAS EAR/PLS OXIMETRY 1: CPT

## 2021-08-21 PROCEDURE — 36415 COLL VENOUS BLD VENIPUNCTURE: CPT | Performed by: NURSE PRACTITIONER

## 2021-08-21 PROCEDURE — 83735 ASSAY OF MAGNESIUM: CPT | Performed by: NURSE PRACTITIONER

## 2021-08-21 PROCEDURE — 85025 COMPLETE CBC W/AUTO DIFF WBC: CPT | Performed by: NURSE PRACTITIONER

## 2021-08-21 PROCEDURE — 80053 COMPREHEN METABOLIC PANEL: CPT | Performed by: NURSE PRACTITIONER

## 2021-08-21 PROCEDURE — 99239 HOSP IP/OBS DSCHRG MGMT >30: CPT | Performed by: FAMILY MEDICINE

## 2021-08-21 PROCEDURE — 84443 ASSAY THYROID STIM HORMONE: CPT | Performed by: NURSE PRACTITIONER

## 2021-08-21 RX ORDER — ALBUTEROL SULFATE 90 UG/1
2 AEROSOL, METERED RESPIRATORY (INHALATION) EVERY 4 HOURS PRN
Status: DISCONTINUED | OUTPATIENT
Start: 2021-08-21 | End: 2021-08-21 | Stop reason: HOSPADM

## 2021-08-21 RX ORDER — PANTOPRAZOLE SODIUM 40 MG/1
40 TABLET, DELAYED RELEASE ORAL
Status: DISCONTINUED | OUTPATIENT
Start: 2021-08-21 | End: 2021-08-21 | Stop reason: HOSPADM

## 2021-08-21 RX ORDER — FLUTICASONE PROPIONATE 110 UG/1
2 AEROSOL, METERED RESPIRATORY (INHALATION) EVERY 12 HOURS SCHEDULED
Status: DISCONTINUED | OUTPATIENT
Start: 2021-08-21 | End: 2021-08-21 | Stop reason: HOSPADM

## 2021-08-21 RX ORDER — MONTELUKAST SODIUM 10 MG/1
10 TABLET ORAL
Status: DISCONTINUED | OUTPATIENT
Start: 2021-08-21 | End: 2021-08-21 | Stop reason: HOSPADM

## 2021-08-21 RX ORDER — GABAPENTIN 300 MG/1
600 CAPSULE ORAL 2 TIMES DAILY
Status: DISCONTINUED | OUTPATIENT
Start: 2021-08-21 | End: 2021-08-21 | Stop reason: HOSPADM

## 2021-08-21 RX ORDER — TOPIRAMATE 25 MG/1
50 TABLET ORAL EVERY 12 HOURS SCHEDULED
Status: DISCONTINUED | OUTPATIENT
Start: 2021-08-21 | End: 2021-08-21 | Stop reason: HOSPADM

## 2021-08-21 RX ORDER — LEVOFLOXACIN 500 MG/1
500 TABLET, FILM COATED ORAL EVERY 24 HOURS
Qty: 5 TABLET | Refills: 0 | Status: SHIPPED | OUTPATIENT
Start: 2021-08-21 | End: 2021-08-23 | Stop reason: CLARIF

## 2021-08-21 RX ORDER — HYDROMORPHONE HCL/PF 1 MG/ML
0.5 SYRINGE (ML) INJECTION EVERY 4 HOURS PRN
Status: DISCONTINUED | OUTPATIENT
Start: 2021-08-21 | End: 2021-08-21 | Stop reason: HOSPADM

## 2021-08-21 RX ADMIN — GABAPENTIN 600 MG: 300 CAPSULE ORAL at 10:41

## 2021-08-21 RX ADMIN — TOPIRAMATE 50 MG: 25 TABLET, FILM COATED ORAL at 10:41

## 2021-08-21 RX ADMIN — ACETAMINOPHEN 650 MG: 325 TABLET ORAL at 05:26

## 2021-08-21 RX ADMIN — FLUTICASONE PROPIONATE 2 PUFF: 110 AEROSOL, METERED RESPIRATORY (INHALATION) at 10:42

## 2021-08-21 RX ADMIN — SODIUM CHLORIDE 150 ML/HR: 0.9 INJECTION, SOLUTION INTRAVENOUS at 05:26

## 2021-08-21 RX ADMIN — OXYCODONE HYDROCHLORIDE 5 MG: 5 TABLET ORAL at 10:42

## 2021-08-21 RX ADMIN — PANTOPRAZOLE SODIUM 40 MG: 40 TABLET, DELAYED RELEASE ORAL at 06:27

## 2021-08-21 RX ADMIN — HYDROMORPHONE HYDROCHLORIDE 0.5 MG: 1 INJECTION, SOLUTION INTRAMUSCULAR; INTRAVENOUS; SUBCUTANEOUS at 11:57

## 2021-08-21 RX ADMIN — ENOXAPARIN SODIUM 40 MG: 40 INJECTION SUBCUTANEOUS at 10:42

## 2021-08-21 RX ADMIN — OXYCODONE HYDROCHLORIDE 5 MG: 5 TABLET ORAL at 00:34

## 2021-08-21 NOTE — H&P
[address]  H&P- Vadim Price 1970, 46 y o  female MRN: [id number]  Unit/Bed#: ED 02 Encounter: [id number]  Primary Care Provider: Kathryn Young DO   Date and time admitted to hospital: 8/20/2021  7:23 PM    * Pyelonephritis of left kidney  Assessment & Plan  · POA with CT abdomen pelvis revealing left pyelonephritis along with pyuria on urine specimen  · History of renal calculi most recently in February 2021 underwent cystoscopy ureteroscopy with stent placement- no evidence of obstructing renal calculi on CT  · Developed hematuria on Monday and over the past 3 days has had severe left flank/abdominal pain  · Empiric Rocephin  · Trend urine and blood cultures  · Pain control    Sepsis with acute renal failure without septic shock (HCC)  Assessment & Plan  · POA with fever, YONY, leukocytosis  · Pyelonephritis on CT abdomen pelvis  · Continue empiric Rocephin  · Fluid resuscitation for ideal body weight  · Await culture results  · Trend fever curve, leukocytosis, procalcitonin    YONY (acute kidney injury) ([facility] [address] )  Assessment & Plan  · Creatinine 1 34 on admission with infectious process  · Trend creatinine  · Empiric Rocephin  · Baseline appears 1 2    Mild intermittent asthma without complication  Assessment & Plan  · No acute exacerbation  · Continue Singulair and inhaled corticosteroid    Chronic pain syndrome  Assessment & Plan  · Patient has known history of fibromyalgia and chronic pain syndrome  · Continue home regimen of Topamax, Neurontin    Morbid obesity with BMI of 45 0-49 9, adult ([facility] [address] )  Assessment & Plan  · Requires intensive lifestyle modification    VTE Pharmacologic Prophylaxis: VTE Score: 4 Moderate Risk (Score 3-4) - Pharmacological DVT Prophylaxis Ordered: enoxaparin (Lovenox)  Code Status: Level 1 - Full Code   Discussion with family: Patient declined call to        Anticipated Length of Stay: Patient will be admitted on an inpatient basis with an anticipated length of stay of greater than 2 midnights secondary to Pyelonephritis  Total Time for Visit, including Counseling / Coordination of Care: 30 minutes Greater than 50% of this total time spent on direct patient counseling and coordination of care  Chief Complaint:  Left flank pain    History of Present Illness:  Víctor Ferguson is a 46 y o  female with a PMH of morbid obesity, hypothyroidism, chronic pain syndrome, asthma recently admitted to [place] in February for obstructing renal calculi undergoing a stent placement who presents with 3 days of severe left flank and abdominal pain along with hematuria  In the emergency department there was no evidence of obstructive uropathy only suspected left pyelonephritis along with pyuria of urinary specimen  Reports allergies to penicillin cephalexin but did tolerate Ancef during last hospitalization, will utilize Rocephin initially pending culture results  Review of Systems:  Review of Systems   Constitutional: Negative for chills and fever  HENT: Negative for ear pain and sore throat  Eyes: Negative for pain and visual disturbance  Respiratory: Negative for cough and shortness of breath  Cardiovascular: Negative for chest pain and palpitations  Gastrointestinal: Positive for abdominal pain  Negative for vomiting  Genitourinary: Positive for flank pain and hematuria  Negative for dysuria  Musculoskeletal: Negative for arthralgias and back pain  Skin: Negative for color change and rash  Neurological: Negative for seizures and syncope  All other systems reviewed and are negative  Past Medical and Surgical History:   Past Medical History:   Diagnosis Date    Arthritis     Asthma     COPD (chronic obstructive pulmonary disease) ([facility] [address] )     Fibromyalgia     Kidney stones        Past Surgical History:   Procedure Laterality Date    APPENDECTOMY      FL RETROGRADE PYELOGRAM  2/25/2021    JOINT REPLACEMENT      bilateral knee   LIPOMA RESECTION      KS CYSTOURETHROSCOPY,URETER CATHETER Right 2/25/2021    Procedure: CYSTOSCOPY RETROGRADE PYELOGRAM WITH INSERTION STENT URETERAL;  Surgeon: Garland Beck MD;  Location: [facility];  Service: Urology       Meds/Allergies:  Prior to Admission medications    Medication Sig Start Date End Date Taking? Authorizing Provider   acetaminophen (TYLENOL) 325 mg tablet Take 2 tablets (650 mg total) by mouth every 6 (six) hours as needed for mild pain, headaches or fever 7/26/20   Rudy Crane MD   albuterol (PROVENTIL HFA,VENTOLIN HFA) 90 mcg/act inhaler Inhale 2 puffs 3 (three) times a day as needed 1/28/19   Historical Provider, MD   fluticasone (ARNUITY ELLIPTA) 200 MCG/ACT AEPB inhaler Inhale 1 puff daily 1/28/19   Historical Provider, MD   gabapentin (NEURONTIN) 600 MG tablet Take 600 mg by mouth 2 (two) times a day    Rudy Crane MD   levothyroxine 25 mcg tablet Take 25 mcg by mouth daily 1/22/20   Historical Provider, MD   montelukast (SINGULAIR) 10 mg tablet Take 10 mg by mouth daily 5/1/20   Historical Provider, MD   pantoprazole (PROTONIX) 20 mg tablet Take 20 mg by mouth 2 (two) times a day    Historical Provider, MD   tamsulosin (FLOMAX) 0 4 mg Take 1 capsule (0 4 mg total) by mouth daily with dinner  Patient not taking: Reported on 2/25/2021 7/26/20   Rudy Crane MD   topiramate (TOPAMAX) 50 MG tablet Take 50 mg by mouth every 12 (twelve) hours    Historical Provider, MD     I have reviewed home medications with patient personally  Allergies: Allergies   Allergen Reactions    Buspirone Palpitations     Other reaction(s):  Other (Please comment)  Palpitations      Amoxicillin      Other reaction(s): Rash  rash      Cephalexin Hives    Chlorhexidine Itching    Ketorolac Tromethamine     Latex Hives    Varenicline Rash     Other reaction(s): Heart Races  Heart races          Social History:  Marital Status: Single   Patient Pre-hospital Living Situation: Home  Patient Pre-hospital Level of Mobility: walks  Patient Pre-hospital Diet Restrictions:  None  Substance Use History:   Social History     Substance and Sexual Activity   Alcohol Use Never     Social History     Tobacco Use   Smoking Status Current Every Day Smoker    Packs/day: 0 50    Years: 30 00    Pack years: 15 00   Smokeless Tobacco Never Used     Social History     Substance and Sexual Activity   Drug Use Never       Family History:  Family History   Problem Relation Age of Onset    Hypertension Father        Physical Exam:     Vitals:   Blood Pressure: 101/70 (08/21/21 0200)  Pulse: 74 (08/21/21 0200)  Temperature: 98 4 °F (36 9 °C) (08/20/21 2115)  Temp Source: Oral (08/20/21 2115)  Respirations: 16 (08/20/21 2200)  Height: 5' 2" (157 5 cm) (08/20/21 2200)  Weight - Scale: 113 kg (248 lb 12 8 oz) (08/20/21 2215)  SpO2: 96 % (08/21/21 0200)    Physical Exam  Vitals and nursing note reviewed  Constitutional:       General: She is not in acute distress  Appearance: She is well-developed  She is obese  HENT:      Head: Normocephalic and atraumatic  Eyes:      Conjunctiva/sclera: Conjunctivae normal    Cardiovascular:      Rate and Rhythm: Normal rate and regular rhythm  Heart sounds: No murmur heard  Pulmonary:      Effort: Pulmonary effort is normal  No respiratory distress  Breath sounds: Normal breath sounds  Abdominal:      Palpations: Abdomen is soft  Tenderness: There is abdominal tenderness  There is left CVA tenderness  Musculoskeletal:         General: No swelling  Normal range of motion  Cervical back: Neck supple  Skin:     General: Skin is warm and dry  Capillary Refill: Capillary refill takes less than 2 seconds  Neurological:      General: No focal deficit present  Mental Status: She is alert  Cranial Nerves: No cranial nerve deficit     Psychiatric:         Mood and Affect: Mood normal          Behavior: Behavior normal          Additional Data: Lab Results:  Results from last 7 days   Lab Units 08/21/21  0526   WBC Thousand/uL 13 16*   HEMOGLOBIN g/dL 14 1   HEMATOCRIT % 42 9   PLATELETS Thousands/uL 198   NEUTROS PCT % 69   LYMPHS PCT % 17   MONOS PCT % 10   EOS PCT % 2     Results from last 7 days   Lab Units 08/21/21  0526   SODIUM mmol/L 142   POTASSIUM mmol/L 3 7   CHLORIDE mmol/L 110*   CO2 mmol/L 24   BUN mg/dL 11   CREATININE mg/dL 1 18   ANION GAP mmol/L 8   CALCIUM mg/dL 7 7*   ALBUMIN g/dL 2 1*   TOTAL BILIRUBIN mg/dL 0 41   ALK PHOS U/L 115   ALT U/L 29   AST U/L 17   GLUCOSE RANDOM mg/dL 85     Results from last 7 days   Lab Units 08/20/21  1934   INR  0 94             Results from last 7 days   Lab Units 08/20/21  2000   LACTIC ACID mmol/L 1 2       Imaging: Reviewed radiology reports from this admission including: abdominal/pelvic CT  CT renal stone study abdomen pelvis wo contrast   Final Result by Mere Alcaraz MD (08/20 2038)      Stranding around the left renal hilum with prominent ureter  No calculus identified  This could be secondary to a passed stone  Workstation performed: [id number]             EKG and Other Studies Reviewed on Admission:   All    ** Please Note: This note has been constructed using a voice recognition system   **

## 2021-08-21 NOTE — DISCHARGE INSTRUCTIONS
Kidney Infection   WHAT YOU NEED TO KNOW:   A kidney infection, or pyelonephritis, is a bacterial infection  The infection usually starts in your bladder or urethra and moves into your kidney  One or both kidneys may be infected  WHILE YOU ARE HERE:   Informed consent  is a legal document that explains the tests, treatments, or procedures that you may need  Informed consent means you understand what will be done and can make decisions about what you want  You give your permission when you sign the consent form  You can have someone sign this form for you if you are not able to sign it  You have the right to understand your medical care in words you know  Before you sign the consent form, understand the risks and benefits of what will be done  Make sure all your questions are answered  Medicines:   · Antibiotics  treat your bacterial infection  They may be given as a pill or IV  · Prescription pain medicine  may help decrease your pain  Do not wait until the pain is severe before you ask for more medicine  · Nausea medicine  will help calm your stomach and control vomiting  Tests:   · Blood tests  will be done to monitor your condition  · Urine tests  will be done to check your infection and monitor your condition  · An ultrasound  uses sound waves to show pictures on a monitor  An ultrasound may be done to show an infection, abscess, or other problems in your kidneys or ureters  · A voiding cystourethrogram , or VCUG, is an x-ray of your bladder and urethra  The pictures will show how well your bladder empties and if there is any blockage  · A CT scan , or CAT scan, may show infection or problems in your urinary tract  You may be given contrast liquid before the scan  Tell the healthcare provider if you have ever had an allergic reaction to contrast liquid  Treatment:   · IV fluids  may be needed to treat dehydration caused by nausea and vomiting       · Surgery  may be needed if a ureter is blocked  The ureter is the tube that takes urine from a kidney to the bladder  A blocked ureter can cause repeated kidney infections  RISKS:   A kidney infection may cause long-term kidney damage, kidney failure, or high blood pressure  The infection could spread to your other organs or blood  This can be life-threatening  You could also get a kidney abscess (pus-filled pocket)  CARE AGREEMENT:   You have the right to help plan your care  Learn about your health condition and how it may be treated  Discuss treatment options with your healthcare providers to decide what care you want to receive  You always have the right to refuse treatment  © Copyright [address] 2021 Information is for End User's use only and may not be sold, redistributed or otherwise used for commercial purposes  All illustrations and images included in CareNotes® are the copyrighted property of [employer]  or Augusto Garcia  The above information is an  only  It is not intended as medical advice for individual conditions or treatments  Talk to your doctor, nurse or pharmacist before following any medical regimen to see if it is safe and effective for you

## 2021-08-21 NOTE — ASSESSMENT & PLAN NOTE
· POA with fever, YONY, leukocytosis  · Pyelonephritis on CT abdomen pelvis  · Continue empiric Rocephin  · Fluid resuscitation for ideal body weight  · Await culture results  · Trend fever curve, leukocytosis, procalcitonin  · Later this afternoon, patient signed AMA after knowing her risk, benefits, alternatives

## 2021-08-21 NOTE — ASSESSMENT & PLAN NOTE
· Creatinine 1 34 on admission with infectious process  · Trend creatinine  · Empiric Rocephin  · Baseline appears 1 2

## 2021-08-21 NOTE — ASSESSMENT & PLAN NOTE
· No acute exacerbation  · Continue Singulair and inhaled corticosteroid  · Follow respiratory protocol

## 2021-08-21 NOTE — PROGRESS NOTES
[address]  Progress Note - Ny Rodríguez 1970, 46 y o  female MRN: [id number]  Unit/Bed#: ED 02 Encounter: [id number]  Primary Care Provider: Tyler Cristina DO   Date and time admitted to hospital: 8/20/2021  7:23 PM    Pyelonephritis of left kidney  Assessment & Plan  · POA with CT abdomen pelvis revealing left pyelonephritis along with pyuria on urine specimen  · History of renal calculi most recently in February 2021 underwent cystoscopy ureteroscopy with stent placement- no evidence of obstructing renal calculi on CT  · Developed hematuria on Monday and over the past 3 days has had severe left flank/abdominal pain  · Empiric Rocephin  · Trend urine and blood cultures  · Pain control    YONY (acute kidney injury) ([facility] )  Assessment & Plan  · Creatinine 1 34 on admission with infectious process  · Trend creatinine  · Empiric Rocephin  · Baseline appears 1 2  · Resolved    * Sepsis with acute renal failure without septic shock (HCC)  Assessment & Plan  · POA with fever, YONY, leukocytosis  · Pyelonephritis on CT abdomen pelvis  · Continue empiric Rocephin  · Fluid resuscitation for ideal body weight  · Await culture results  · Trend fever curve, leukocytosis, procalcitonin    Mild intermittent asthma without complication  Assessment & Plan  · No acute exacerbation  · Continue Singulair and inhaled corticosteroid  · Follow respiratory protocol    Chronic pain syndrome  Assessment & Plan  · Patient has known history of fibromyalgia and chronic pain syndrome  · Continue home regimen of Topamax, Neurontin    Morbid obesity with BMI of 45 0-49 9, adult ([facility] )  Assessment & Plan  · Requires intensive lifestyle modification          VTE Pharmacologic Prophylaxis: VTE Score: 4 Moderate Risk (Score 3-4) - Pharmacological DVT Prophylaxis Ordered: enoxaparin (Lovenox)  Patient Centered Rounds: I performed bedside rounds with nursing staff today    Discussions with Specialists or Other Care Team Provider:  None    Education and Discussions with Family / Patient: The patient  Time Spent for Care: 20 minutes  More than 50% of total time spent on counseling and coordination of care as described above  Current Length of Stay: 1 day(s)  Current Patient Status: Inpatient   Certification Statement: The patient will continue to require additional inpatient hospital stay due to Sepsis, pyelonephritis  Discharge Plan: Anticipate discharge in 48-72 hrs to home  Code Status: Level 1 - Full Code    Subjective:   Seen evident during done  Patient is still complaining of severe left-sided flank pain, radiating towards her groin and lower abdomen  Objective:     Vitals:   Temp (24hrs), Av 1 °F (37 8 °C), Min:98 4 °F (36 9 °C), Max:101 8 °F (38 8 °C)    Temp:  [98 4 °F (36 9 °C)-101 8 °F (38 8 °C)] 98 4 °F (36 9 °C)  HR:  [] 74  Resp:  [16-20] 16  BP: ()/() 101/70  SpO2:  [94 %-99 %] 96 %  Body mass index is 45 51 kg/m²  Input and Output Summary (last 24 hours): Intake/Output Summary (Last 24 hours) at 2021 1115  Last data filed at 2021 2250  Gross per 24 hour   Intake 2100 ml   Output --   Net 2100 ml       Physical Exam:   Physical Exam  Vitals and nursing note reviewed  Constitutional:       Appearance: She is obese  HENT:      Nose: No congestion  Mouth/Throat:      Pharynx: No oropharyngeal exudate  Eyes:      General: No scleral icterus  Conjunctiva/sclera: Conjunctivae normal       Pupils: Pupils are equal, round, and reactive to light  Cardiovascular:      Rate and Rhythm: Normal rate  Heart sounds: No friction rub  No gallop  Pulmonary:      Effort: Pulmonary effort is normal  No respiratory distress  Breath sounds: No stridor  No wheezing or rhonchi  Abdominal:      General: Abdomen is flat  Bowel sounds are normal       Tenderness: There is abdominal tenderness (Lower abdomen)  There is left CVA tenderness     Musculoskeletal: General: Normal range of motion  Cervical back: Normal range of motion  Right lower leg: No edema  Left lower leg: No edema  Skin:     General: Skin is warm  Neurological:      General: No focal deficit present  Mental Status: She is alert and oriented to person, place, and time     Psychiatric:         Mood and Affect: Mood normal          Additional Data:     Labs:  Results from last 7 days   Lab Units 08/21/21  0526   WBC Thousand/uL 13 16*   HEMOGLOBIN g/dL 14 1   HEMATOCRIT % 42 9   PLATELETS Thousands/uL 198   NEUTROS PCT % 69   LYMPHS PCT % 17   MONOS PCT % 10   EOS PCT % 2     Results from last 7 days   Lab Units 08/21/21  0526   SODIUM mmol/L 142   POTASSIUM mmol/L 3 7   CHLORIDE mmol/L 110*   CO2 mmol/L 24   BUN mg/dL 11   CREATININE mg/dL 1 18   ANION GAP mmol/L 8   CALCIUM mg/dL 7 7*   ALBUMIN g/dL 2 1*   TOTAL BILIRUBIN mg/dL 0 41   ALK PHOS U/L 115   ALT U/L 29   AST U/L 17   GLUCOSE RANDOM mg/dL 85     Results from last 7 days   Lab Units 08/20/21  1934   INR  0 94             Results from last 7 days   Lab Units 08/20/21  2000   LACTIC ACID mmol/L 1 2       Lines/Drains:  Invasive Devices     Peripheral Intravenous Line            Peripheral IV 08/20/21 Left Antecubital <1 day    Peripheral IV 08/20/21 Right Wrist <1 day                      Imaging: Reviewed radiology reports from this admission including: abdominal/pelvic CT    Recent Cultures (last 7 days):         Last 24 Hours Medication List:   Current Facility-Administered Medications   Medication Dose Route Frequency Provider Last Rate    acetaminophen  650 mg Oral Q6H PRN Marie S Tri, CRNP      albuterol  2 puff Inhalation Q4H PRN Marie S Tri, CRNP      cefTRIAXone  1,000 mg Intravenous Q24H Marie S Tri, CRNP Stopped (08/20/21 2250)    enoxaparin  40 mg Subcutaneous Q24H [facility] Marie S Tri, CRNP      fluticasone  2 puff Inhalation Q12H [facility] Marie S Tri, CRNP      gabapentin  600 mg Oral BID Marie S Tri, CRNP      HYDROmorphone  0 5 mg Intravenous Q4H PRN Kalie Rivas MD      montelukast  10 mg Oral HS Marie S Tri, CRNP      ondansetron  4 mg Intravenous Q8H PRN Marie S Tri, CRNP      oxyCODONE  5 mg Oral Q6H PRN Marie S Tri, CRNP      pantoprazole  40 mg Oral Early Morning Marie S Tri, CRNP      sodium chloride  150 mL/hr Intravenous Continuous Chiquita Fairly Remaley,  mL/hr (08/21/21 0526)    topiramate  50 mg Oral Q12H [address] Marie S Tri, CRNP          Today, Patient Was Seen By: Kalie Rivas MD    **Please Note: This note may have been constructed using a voice recognition system  **

## 2021-08-21 NOTE — NURSING NOTE
Patient requesting to sign out AMA due to not getting pain medication frequent enough   Patient refused to discuss further about her pain management

## 2021-08-21 NOTE — UTILIZATION REVIEW
Initial Clinical Review    Admission: Date/Time/Statement:   Admission Orders (From admission, onward)     Ordered        08/20/21 2055  INPATIENT ADMISSION  Once                   Orders Placed This Encounter   Procedures    INPATIENT ADMISSION     Standing Status:   Standing     Number of Occurrences:   1     Order Specific Question:   Level of Care     Answer:   Med Surg [16]     Order Specific Question:   Estimated length of stay     Answer:   More than 2 Midnights     Order Specific Question:   Certification     Answer:   I certify that inpatient services are medically necessary for this patient for a duration of greater than two midnights  See H&P and MD Progress Notes for additional information about the patient's course of treatment  ED Arrival Information     Expected Arrival Acuity    - 8/20/2021 19:23 Urgent         Means of arrival Escorted by Service Admission type    Ambulance [facility] Urgent         Arrival complaint    back pain        Chief Complaint   Patient presents with    Flank Pain     Blood in urine on Monday - Left flank pain starting wednesday - hx of kidney stones       Initial Presentation: 47 yo female to ED from home w/ 3 days of severe L flank and abd pain along w/ hematuria   In ED no evidence of obstructive uropathy only suspected left pyelonephritis along with pyuria of urinary   Admitted IP status for pyelonephrosis cont IV abx , trend us and BC , pain control   F/u BC , pct and wbc   YONY cr 1 34 , baseline 1 2 cont to trend   PE : L CVA tenderness , abd tenderness    Date: 8/21   Day 2:  still complaining of severe left-sided flank pain, radiating towards her groin and lower abdomen  Cont IV abx , trend ua cx and BC and pain control        ED Triage Vitals   Temperature Pulse Respirations Blood Pressure SpO2   08/20/21 1931 08/20/21 1931 08/20/21 1931 08/20/21 1931 08/20/21 1931   (!) 101 8 °F (38 8 °C) (!) 110 20 (!) 151/108 99 %      Temp Source Heart Rate Source Patient Position - Orthostatic VS BP Location FiO2 (%)   08/20/21 1931 08/20/21 2030 08/20/21 1931 08/20/21 1931 --   Oral Monitor Sitting Right arm       Pain Score       08/20/21 1931       8          Wt Readings from Last 1 Encounters:   08/20/21 113 kg (248 lb 12 8 oz)     Additional Vital Signs:   08/21/21 1137  97 °F (36 1 °C)Abnormal   78  16  109/79  --  99 %  None (Room air)  Sitting   08/21/21 0716  --  --  --  --  --  --  None (Room air)  --   08/21/21 0200  --  74  --  101/70  81  96 %  None (Room air)  Sitting   08/21/21 0145  --  73  --  98/66  78  95 %  None (Room air)  Lying   08/20/21 2215  --  91  --  116/72  89  94 %  None (Room air)  Lying   08/20/21 2200  --  102  16  107/55  73  94 %  None (Room air)  Lying   08/20/21 2145  --  91  18  118/62  84  94 %  None (Room air)  Lying   08/20/21 2130  --  95  --  102/64  78  95 %  None (Room air)  Lying   08/20/21 2115  98 4 °F (36 9 °C)  103  --  109/60  78  97 %  None (Room air)  Lying   08/20/21 2030  --  108Abnormal   --  111/58  78  96 %  None (Room air)  Lying   08/20/21 2000  --  --  --  128/77  97  --  --         Pertinent Labs/Diagnostic Test Results:   8/20 CT renal stone study Stranding around the left renal hilum with prominent ureter  No calculus identified  This could be secondary to a passed stone      Results from last 7 days   Lab Units 08/21/21  0526 08/20/21  1934   WBC Thousand/uL 13 16* 18 19*   HEMOGLOBIN g/dL 14 1 16 8*   HEMATOCRIT % 42 9 49 7*   PLATELETS Thousands/uL 198 300   NEUTROS ABS Thousands/µL 9 22* 14 13*     Results from last 7 days   Lab Units 08/21/21  0526 08/20/21  1934   SODIUM mmol/L 142 136   POTASSIUM mmol/L 3 7 4 2   CHLORIDE mmol/L 110* 100   CO2 mmol/L 24 22   ANION GAP mmol/L 8 14*   BUN mg/dL 11 12   CREATININE mg/dL 1 18 1 34*   EGFR ml/min/1 73sq m 53 45   CALCIUM mg/dL 7 7* 9 2   MAGNESIUM mg/dL 1 8  --      Results from last 7 days   Lab Units 08/21/21  0526 08/20/21  1934 AST U/L 17 24   ALT U/L 29 47   ALK PHOS U/L 115 174*   TOTAL PROTEIN g/dL 5 4* 7 9   ALBUMIN g/dL 2 1* 3 4*   TOTAL BILIRUBIN mg/dL 0 41 0 70     Results from last 7 days   Lab Units 08/21/21  0526 08/20/21 1934   GLUCOSE RANDOM mg/dL 85 122       Results from last 7 days   Lab Units 08/20/21  1934   PROTIME seconds 12 4   INR  0 94   PTT seconds 29     Results from last 7 days   Lab Units 08/21/21  0526   TSH 3RD GENERATON uIU/mL 2 396     Results from last 7 days   Lab Units 08/20/21 2000   LACTIC ACID mmol/L 1 2     Results from last 7 days   Lab Units 08/20/21 1934   LIPASE u/L 75     Results from last 7 days   Lab Units 08/20/21 1937   CLARITY UA  Turbid   COLOR UA  Yellow   SPEC GRAV UA  1 010   PH UA  6 0   GLUCOSE UA mg/dl Negative   KETONES UA mg/dl Negative   BLOOD UA  Large*   PROTEIN UA mg/dl 100 (2+)*   NITRITE UA  Positive*   BILIRUBIN UA  Negative   UROBILINOGEN UA E U /dl 0 2   LEUKOCYTES UA  Large*   WBC UA /hpf Innumerable*   RBC UA /hpf Field obscured, unable to enumerate*   BACTERIA UA /hpf Moderate*   EPITHELIAL CELLS WET PREP /hpf None Seen     ED Treatment:   Medication Administration from 08/20/2021 1923 to 08/21/2021 1134       Date/Time Order Dose Route Action     08/20/2021 1937 morphine (PF) 4 mg/mL injection 4 mg 4 mg Intravenous Given     08/20/2021 1937 ondansetron (ZOFRAN) injection 4 mg 4 mg Intravenous Given     08/20/2021 1937 sodium chloride 0 9 % bolus 1,000 mL 1,000 mL Intravenous New Bag     08/20/2021 2023 HYDROmorphone (DILAUDID) injection 1 mg 1 mg Intravenous Given     08/20/2021 2024 ciprofloxacin (CIPRO) tablet 500 mg 500 mg Oral Given     08/20/2021 2024 acetaminophen (TYLENOL) tablet 975 mg 975 mg Oral Given     08/21/2021 0526 sodium chloride 0 9 % infusion 150 mL/hr Intravenous New Bag     08/20/2021 2251 sodium chloride 0 9 % infusion 150 mL/hr Intravenous New Bag     08/20/2021 2127 lactated ringers bolus 1,000 mL 1,000 mL Intravenous New Bag     08/20/2021 2213 cefTRIAXone (ROCEPHIN) IVPB (premix in dextrose) 1,000 mg 50 mL 1,000 mg Intravenous New Bag     08/21/2021 0526 acetaminophen (TYLENOL) tablet 650 mg 650 mg Oral Given     08/21/2021 1042 oxyCODONE (ROXICODONE) IR tablet 5 mg 5 mg Oral Given     08/21/2021 0034 oxyCODONE (ROXICODONE) IR tablet 5 mg 5 mg Oral Given     08/21/2021 1041 gabapentin (NEURONTIN) capsule 600 mg 600 mg Oral Given     08/21/2021 1042 fluticasone (FLOVENT HFA) 110 MCG/ACT inhaler 2 puff 2 puff Inhalation Given     08/21/2021 0627 pantoprazole (PROTONIX) EC tablet 40 mg 40 mg Oral Given     08/21/2021 1041 topiramate (TOPAMAX) tablet 50 mg 50 mg Oral Given     08/21/2021 1042 enoxaparin (LOVENOX) subcutaneous injection 40 mg 40 mg Subcutaneous Given        Past Medical History:   Diagnosis Date    Arthritis     Asthma     [facility]     Fibromyalgia     Kidney stones      Present on Admission:   Pyelonephritis of left kidney   Sepsis with acute renal failure without septic shock ([facility] )   YONY (acute kidney injury) ([facility] )   Mild intermittent asthma without complication   Chronic pain syndrome      Admitting Diagnosis: Back pain [M54 9]  Age/Sex: 46 y o  female  Admission Orders:  Scheduled Medications:  cefTRIAXone, 1,000 mg, Intravenous, Q24H  enoxaparin, 40 mg, Subcutaneous, Q24H [address]  fluticasone, 2 puff, Inhalation, Q12H [address]  gabapentin, 600 mg, Oral, BID  montelukast, 10 mg, Oral, HS  pantoprazole, 40 mg, Oral, Early Morning  topiramate, 50 mg, Oral, Q12H [address]      Continuous IV Infusions:  sodium chloride, 150 mL/hr, Intravenous, Continuous      PRN Meds:  acetaminophen, 650 mg, Oral, Q6H PRN  albuterol, 2 puff, Inhalation, Q4H PRN  HYDROmorphone, 0 5 mg, Intravenous, Q4H PRN  ondansetron, 4 mg, Intravenous, Q8H PRN  oxyCODONE, 5 mg, Oral, Q6H PRN      Network Utilization Review Department  ATTENTION: Please call with any questions or concerns to [phone] and carefully listen to the prompts so that you are directed to the right person  All voicemails are confidential   Amaury Camejo all requests for admission clinical reviews, approved or denied determinations and any other requests to dedicated fax number below belonging to the campus where the patient is receiving treatment   List of dedicated fax numbers for the Facilities:  [address] DENIALS (Administrative/Medical Necessity) [phone]   [address] (Maternity/NICU/Pediatrics) [phone] [address] [place] [address] [id number] [address] [facility] 14[facility] [address] [facility] [place] [address] [phone]

## 2021-08-21 NOTE — RESPIRATORY THERAPY NOTE
RT Protocol Note  Vadim Mcintosh 46 y o  female MRN: [id number]  Unit/Bed#: PACU 01 Encounter: [id number]    Assessment    Principal Problem:    Sepsis with acute renal failure without septic shock [facility])  Active Problems: Morbid obesity with BMI of 45 0-49 9, adult (HCC)    Chronic pain syndrome    Mild intermittent asthma without complication    YONY (acute kidney injury) ([place] [address] )    Pyelonephritis of left kidney      Home Pulmonary Medications:  Albuterol MDI, Arnuity Ellipta       Past Medical History:   Diagnosis Date    Arthritis     Asthma     COPD (chronic obstructive pulmonary disease) ([place] [address] )     Fibromyalgia     Kidney stones      Social History     Socioeconomic History    Marital status: Single     Spouse name: None    Number of children: None    Years of education: None    Highest education level: None   Occupational History    None   Tobacco Use    Smoking status: Current Every Day Smoker     Packs/day: 0 50     Years: 30 00     Pack years: 15 00    Smokeless tobacco: Never Used   Vaping Use    Vaping Use: Never used   Substance and Sexual Activity    Alcohol use: Never    Drug use: Never    Sexual activity: Never   Other Topics Concern    None   Social History Narrative    None     Social Determinants of Health     Financial Resource Strain:     Difficulty of Paying Living Expenses:    Food Insecurity:     Worried About Running Out of Food in the Last Year:     Ran Out of Food in the Last Year:    Transportation Needs:     Lack of Transportation (Medical):      Lack of Transportation (Non-Medical):    Physical Activity:     Days of Exercise per Week:     Minutes of Exercise per Session:    Stress:     Feeling of Stress :    Social Connections:     Frequency of Communication with Friends and Family:     Frequency of Social Gatherings with Friends and Family:     Attends Spiritism Services:     Active Member of Clubs or Organizations:     Attends Club or Organization Meetings:  Marital Status:    Intimate Partner Violence:     Fear of Current or Ex-Partner:     Emotionally Abused:     Physically Abused:     Sexually Abused:        Subjective    Subjective Data: Pt denies SOB    Objective    Physical Exam:   Assessment Type: Assess only  General Appearance: Alert, Awake  Respiratory Pattern: Normal  Chest Assessment: Chest expansion symmetrical  Bilateral Breath Sounds: Diminished  Cough: None  O2 Device: RA    Vitals:  Blood pressure (!) 83/63, pulse 74, temperature 98 °F (36 7 °C), temperature source Oral, resp  rate 20, height 5' 2" (1 575 m), weight 113 kg (248 lb 12 8 oz), SpO2 98 %  Imaging and other studies: I have personally reviewed pertinent reports  O2 Device: RA     Plan    Respiratory Plan: Home Bronchodilator Patient pathway        Resp Comments: (P) Pt resting comfortably on RA here with Sepsis and ARF  Pt has hx of asthma and takes Albuterol MDI and Dagoberto Bassett at home  Pt is not wheezing or currently in any respiratory distress  Home regimen continued here

## 2021-08-21 NOTE — DISCHARGE SUMMARY
[address]  Discharge- Ny Rodríguez 1970, 46 y o  female MRN: [id number]  Unit/Bed#: PACU 01 Encounter: [id number]  Primary Care Provider: Tyler Cristina DO   Date and time admitted to hospital: 8/20/2021  7:23 PM    Pyelonephritis of left kidney  Assessment & Plan  · POA with CT abdomen pelvis revealing left pyelonephritis along with pyuria on urine specimen  · History of renal calculi most recently in February 2021 underwent cystoscopy ureteroscopy with stent placement- no evidence of obstructing renal calculi on CT  · Developed hematuria on Monday and over the past 3 days has had severe left flank/abdominal pain  · Empiric Rocephin  · Trend urine and blood cultures  · Pain control  · Later this afternoon, patient got very upset, and signed AMA-after knowing the risks, benefits and side effects      YONY (acute kidney injury) ([facility] )  Assessment & Plan  · Creatinine 1 34 on admission with infectious process  · Trend creatinine  · Empiric Rocephin  · Baseline appears 1 2  · Resolved    * Sepsis with acute renal failure without septic shock (HCC)  Assessment & Plan  · POA with fever, YONY, leukocytosis  · Pyelonephritis on CT abdomen pelvis  · Continue empiric Rocephin  · Fluid resuscitation for ideal body weight  · Await culture results  · Trend fever curve, leukocytosis, procalcitonin  · Later this afternoon, patient signed AMA after knowing her risk, benefits, alternatives    Mild intermittent asthma without complication  Assessment & Plan  · No acute exacerbation  · Continue Singulair and inhaled corticosteroid  · Follow respiratory protocol    Chronic pain syndrome  Assessment & Plan  · Patient has known history of fibromyalgia and chronic pain syndrome  · Continue home regimen of Topamax, Neurontin    Morbid obesity with BMI of 45 0-49 9, adult [facility])  Assessment & Plan  · Requires intensive lifestyle modification      Medical Problems     Resolved Problems  Date Reviewed: 2/26/2021 None              Discharging Physician / Practitioner: Solis Tejada MD  PCP: Bell Correa DO  Admission Date:   Admission Orders (From admission, onward)     Ordered        08/20/21 2055  INPATIENT ADMISSION  Once                   Discharge Date: 08/21/21    Consultations During Hospital Stay:  · None    Procedures Performed:   CT renal stone study abdomen pelvis wo contrast   Final Result by Mayuri Blanton MD (08/20 2038)      Stranding around the left renal hilum with prominent ureter  No calculus identified  This could be secondary to a passed stone  Workstation performed: [id number]         ·   ·     Significant Findings / Test Results:   Lab Results   Component Value Date    WBC 13 16 (H) 08/21/2021    HGB 14 1 08/21/2021    HCT 42 9 08/21/2021    MCV 89 08/21/2021     08/21/2021   ·   Lab Results   Component Value Date    SODIUM 142 08/21/2021    K 3 7 08/21/2021     (H) 08/21/2021    CO2 24 08/21/2021    AGAP 8 08/21/2021    BUN 11 08/21/2021    CREATININE 1 18 08/21/2021    GLUC 85 08/21/2021    CALCIUM 7 7 (L) 08/21/2021    AST 17 08/21/2021    ALT 29 08/21/2021    ALKPHOS 115 08/21/2021    TP 5 4 (L) 08/21/2021    TBILI 0 41 08/21/2021    EGFR 53 08/21/2021   ·   ·     Incidental Findings:   · As mentioned above     Test Results Pending at Discharge (will require follow up): · Blood culture, urine culture     Outpatient Tests Requested:  · None    Complications:  None    Reason for Admission:  Left flank pain    Hospital Course:   Franco Carlton is a 46 y o  female patient who originally presented to the hospital on 8/20/2021 due to left flank pain  Patient admitted under the diagnosis of sepsis secondary to pyelonephritis and YONY  Patient started IV antibiotic, received IV fluid  As well as pain management  Later this afternoon, patient changed her mind, and signed AMA after knowing risks, benefits, alternatives      On discharge date, a prescription of levofloxacin sent to patient's pharmacy to prevent any kind of serious complication  Patient aware that if condition get worse, she can return to emergency room  The patient, initially admitted to the hospital as inpatient, was discharged earlier than expected given the following: Patient signed AMA  Please see above list of diagnoses and related plan for additional information  Condition at Discharge: fair    Discharge Day Visit / Exam:   * Please refer to separate progress note for these details *    Discussion with Family: Patient signed AMA  Discharge instructions/Information to patient and family:   See after visit summary for information provided to patient and family  Provisions for Follow-Up Care:  See after visit summary for information related to follow-up care and any pertinent home health orders  Disposition:   Other: Patient signed AMA    Planned Readmission:  If condition get worse     Discharge Statement:  I spent >35 minutes discharging the patient  This time was spent on the day of discharge  I had direct contact with the patient on the day of discharge  Greater than 50% of the total time was spent examining patient, answering all patient questions, arranging and discussing plan of care with patient as well as directly providing post-discharge instructions  Additional time then spent on discharge activities  Discharge Medications:  See after visit summary for reconciled discharge medications provided to patient and/or family        **Please Note: This note may have been constructed using a voice recognition system**

## 2021-08-21 NOTE — ASSESSMENT & PLAN NOTE
· Creatinine 1 34 on admission with infectious process  · Trend creatinine  · Empiric Rocephin  · Baseline appears 1 2  · Resolved

## 2021-08-21 NOTE — ASSESSMENT & PLAN NOTE
· POA with fever, YONY, leukocytosis  · Pyelonephritis on CT abdomen pelvis  · Continue empiric Rocephin  · Fluid resuscitation for ideal body weight  · Await culture results  · Trend fever curve, leukocytosis, procalcitonin

## 2021-08-21 NOTE — PLAN OF CARE
Problem: Potential for Falls  Goal: Patient will remain free of falls  Description: INTERVENTIONS:  - Educate patient/family on patient safety including physical limitations  - Instruct patient to call for assistance with activity   - Consult OT/PT to assist with strengthening/mobility   - Keep Call bell within reach  - Keep bed low and locked with side rails adjusted as appropriate  - Keep care items and personal belongings within reach  - Initiate and maintain comfort rounds  - Make Fall Risk Sign visible to staff  - Offer Toileting every  Hours, in advance of need  - Initiate/Maintain alarm  - Obtain necessary fall risk management equipment:  - Apply yellow socks and bracelet for high fall risk patients  - Consider moving patient to room near nurses station  Outcome: Progressing     Problem: Nutrition/Hydration-ADULT  Goal: Nutrient/Hydration intake appropriate for improving, restoring or maintaining nutritional needs  Description: Monitor and assess patient's nutrition/hydration status for malnutrition  Collaborate with interdisciplinary team and initiate plan and interventions as ordered  Monitor patient's weight and dietary intake as ordered or per policy  Utilize nutrition screening tool and intervene as necessary  Determine patient's food preferences and provide high-protein, high-caloric foods as appropriate       INTERVENTIONS:  - Monitor oral intake, urinary output, labs, and treatment plans  - Assess nutrition and hydration status and recommend course of action  - Evaluate amount of meals eaten  - Assist patient with eating if necessary   - Allow adequate time for meals  - Recommend/ encourage appropriate diets, oral nutritional supplements, and vitamin/mineral supplements  - Order, calculate, and assess calorie counts as needed  - Recommend, monitor, and adjust tube feedings and TPN/PPN based on assessed needs  - Assess need for intravenous fluids  - Provide specific nutrition/hydration education as appropriate  - Include patient/family/caregiver in decisions related to nutrition  Outcome: Progressing     Problem: METABOLIC, FLUID AND ELECTROLYTES - ADULT  Goal: Electrolytes maintained within normal limits  Description: INTERVENTIONS:  - Monitor labs and assess patient for signs and symptoms of electrolyte imbalances  - Administer electrolyte replacement as ordered  - Monitor response to electrolyte replacements, including repeat lab results as appropriate  - Instruct patient on fluid and nutrition as appropriate  Outcome: Progressing  Goal: Fluid balance maintained  Description: INTERVENTIONS:  - Monitor labs   - Monitor I/O and WT  - Instruct patient on fluid and nutrition as appropriate  - Assess for signs & symptoms of volume excess or deficit  Outcome: Progressing  Goal: Glucose maintained within target range  Description: INTERVENTIONS:  - Monitor Blood Glucose as ordered  - Assess for signs and symptoms of hyperglycemia and hypoglycemia  - Administer ordered medications to maintain glucose within target range  - Assess nutritional intake and initiate nutrition service referral as needed  Outcome: Progressing

## 2021-08-21 NOTE — ASSESSMENT & PLAN NOTE
· POA with CT abdomen pelvis revealing left pyelonephritis along with pyuria on urine specimen  · History of renal calculi most recently in February 2021 underwent cystoscopy ureteroscopy with stent placement- no evidence of obstructing renal calculi on CT  · Developed hematuria on Monday and over the past 3 days has had severe left flank/abdominal pain  · Empiric Rocephin  · Trend urine and blood cultures  · Pain control

## 2021-08-21 NOTE — ASSESSMENT & PLAN NOTE
· POA with CT abdomen pelvis revealing left pyelonephritis along with pyuria on urine specimen  · History of renal calculi most recently in February 2021 underwent cystoscopy ureteroscopy with stent placement- no evidence of obstructing renal calculi on CT  · Developed hematuria on Monday and over the past 3 days has had severe left flank/abdominal pain  · Empiric Rocephin  · Trend urine and blood cultures  · Pain control  · Later this afternoon, patient got very upset, and signed AMA-after knowing the risks, benefits and side effects

## 2021-08-21 NOTE — ASSESSMENT & PLAN NOTE
· Patient has known history of fibromyalgia and chronic pain syndrome  · Continue home regimen of Topamax, Neurontin

## 2021-08-22 ENCOUNTER — TELEPHONE (OUTPATIENT)
Dept: EMERGENCY DEPT | Facility: HOSPITAL | Age: 51
End: 2021-08-22

## 2021-08-22 NOTE — TELEPHONE ENCOUNTER
Left call back number to discuss test results seems as though urine has grown out bacteria, unsure if patient is on any antibiotics patient left AMA

## 2021-08-23 ENCOUNTER — TELEPHONE (OUTPATIENT)
Dept: EMERGENCY DEPT | Facility: HOSPITAL | Age: 51
End: 2021-08-23

## 2021-08-23 LAB — BACTERIA UR CULT: ABNORMAL

## 2021-08-23 RX ORDER — NITROFURANTOIN 25; 75 MG/1; MG/1
100 CAPSULE ORAL 2 TIMES DAILY
Qty: 10 CAPSULE | Refills: 0 | Status: SHIPPED | OUTPATIENT
Start: 2021-08-23

## 2021-08-23 NOTE — UTILIZATION REVIEW
Initial Clinical Review    Admission: Date/Time/Statement:   Admission Orders (From admission, onward)     Ordered        08/20/21 2055  INPATIENT ADMISSION  Once                   Orders Placed This Encounter   Procedures    INPATIENT ADMISSION     Standing Status:   Standing     Number of Occurrences:   1     Order Specific Question:   Level of Care     Answer:   Med Surg [16]     Order Specific Question:   Estimated length of stay     Answer:   More than 2 Midnights     Order Specific Question:   Certification     Answer:   I certify that inpatient services are medically necessary for this patient for a duration of greater than two midnights  See H&P and MD Progress Notes for additional information about the patient's course of treatment  ED Arrival Information     Expected Arrival Acuity    - 8/20/2021 19:23 Urgent         Means of arrival Escorted by Service Admission type    [facility]         Arrival complaint    back pain        Chief Complaint   Patient presents with    Flank Pain     Blood in urine on Monday - Left flank pain starting wednesday - hx of kidney stones       Initial Presentation: 47 yo female to ED from home w/ 3 days of severe L flank and abd pain along w/ hematuria   In ED no evidence of obstructive uropathy only suspected left pyelonephritis along with pyuria of urinary   Admitted IP status for pyelonephrosis cont IV abx , trend us and BC , pain control   F/u BC , pct and wbc   YONY cr 1 34 , baseline 1 2 cont to trend   PE : L CVA tenderness , abd tenderness    Date: 8/21   Day 2:  still complaining of severe left-sided flank pain, radiating towards her groin and lower abdomen  Cont IV abx , trend ua cx and BC and pain control        ED Triage Vitals   Temperature Pulse Respirations Blood Pressure SpO2   08/20/21 1931 08/20/21 1931 08/20/21 1931 08/20/21 1931 08/20/21 1931   (!) 101 8 °F (38 8 °C) (!) 110 20 (!) 151/108 99 %      Temp Source Heart Rate Source Patient Position - Orthostatic VS BP Location FiO2 (%)   08/20/21 1931 08/20/21 2030 08/20/21 1931 08/20/21 1931 --   Oral Monitor Sitting Right arm       Pain Score       08/20/21 1931       8          Wt Readings from Last 1 Encounters:   08/20/21 113 kg (248 lb 12 8 oz)     Additional Vital Signs:   08/21/21 1137  97 °F (36 1 °C)Abnormal   78  16  109/79  --  99 %  None (Room air)  Sitting   08/21/21 0716  --  --  --  --  --  --  None (Room air)  --   08/21/21 0200  --  74  --  101/70  81  96 %  None (Room air)  Sitting   08/21/21 0145  --  73  --  98/66  78  95 %  None (Room air)  Lying   08/20/21 2215  --  91  --  116/72  89  94 %  None (Room air)  Lying   08/20/21 2200  --  102  16  107/55  73  94 %  None (Room air)  Lying   08/20/21 2145  --  91  18  118/62  84  94 %  None (Room air)  Lying   08/20/21 2130  --  95  --  102/64  78  95 %  None (Room air)  Lying   08/20/21 2115  98 4 °F (36 9 °C)  103  --  109/60  78  97 %  None (Room air)  Lying   08/20/21 2030  --  108Abnormal   --  111/58  78  96 %  None (Room air)  Lying   08/20/21 2000  --  --  --  128/77  97  --  --         Pertinent Labs/Diagnostic Test Results:   8/20 CT renal stone study Stranding around the left renal hilum with prominent ureter  No calculus identified  This could be secondary to a passed stone      Results from last 7 days   Lab Units 08/21/21  0526 08/20/21  1934   WBC Thousand/uL 13 16* 18 19*   HEMOGLOBIN g/dL 14 1 16 8*   HEMATOCRIT % 42 9 49 7*   PLATELETS Thousands/uL 198 300   NEUTROS ABS Thousands/µL 9 22* 14 13*     Results from last 7 days   Lab Units 08/21/21  0526 08/20/21  1934   SODIUM mmol/L 142 136   POTASSIUM mmol/L 3 7 4 2   CHLORIDE mmol/L 110* 100   CO2 mmol/L 24 22   ANION GAP mmol/L 8 14*   BUN mg/dL 11 12   CREATININE mg/dL 1 18 1 34*   EGFR ml/min/1 73sq m 53 45   CALCIUM mg/dL 7 7* 9 2   MAGNESIUM mg/dL 1 8  --      Results from last 7 days   Lab Units 08/21/21  0526 08/20/21  1934 AST U/L 17 24   ALT U/L 29 47   ALK PHOS U/L 115 174*   TOTAL PROTEIN g/dL 5 4* 7 9   ALBUMIN g/dL 2 1* 3 4*   TOTAL BILIRUBIN mg/dL 0 41 0 70     Results from last 7 days   Lab Units 08/21/21  0526 08/20/21 1934   GLUCOSE RANDOM mg/dL 85 122       Results from last 7 days   Lab Units 08/20/21  1934   PROTIME seconds 12 4   INR  0 94   PTT seconds 29     Results from last 7 days   Lab Units 08/21/21  0526   TSH 3RD GENERATON uIU/mL 2 396     Results from last 7 days   Lab Units 08/20/21 2000   LACTIC ACID mmol/L 1 2     Results from last 7 days   Lab Units 08/20/21 1934   LIPASE u/L 75     Results from last 7 days   Lab Units 08/20/21 1937   CLARITY UA  Turbid   COLOR UA  Yellow   SPEC GRAV UA  1 010   PH UA  6 0   GLUCOSE UA mg/dl Negative   KETONES UA mg/dl Negative   BLOOD UA  Large*   PROTEIN UA mg/dl 100 (2+)*   NITRITE UA  Positive*   BILIRUBIN UA  Negative   UROBILINOGEN UA E U /dl 0 2   LEUKOCYTES UA  Large*   WBC UA /hpf Innumerable*   RBC UA /hpf Field obscured, unable to enumerate*   BACTERIA UA /hpf Moderate*   EPITHELIAL CELLS WET PREP /hpf None Seen     ED Treatment:   Medication Administration from 08/20/2021 1923 to 08/21/2021 1134       Date/Time Order Dose Route Action     08/20/2021 1937 morphine (PF) 4 mg/mL injection 4 mg 4 mg Intravenous Given     08/20/2021 1937 ondansetron (ZOFRAN) injection 4 mg 4 mg Intravenous Given     08/20/2021 1937 sodium chloride 0 9 % bolus 1,000 mL 1,000 mL Intravenous New Bag     08/20/2021 2023 HYDROmorphone (DILAUDID) injection 1 mg 1 mg Intravenous Given     08/20/2021 2024 ciprofloxacin (CIPRO) tablet 500 mg 500 mg Oral Given     08/20/2021 2024 acetaminophen (TYLENOL) tablet 975 mg 975 mg Oral Given     08/21/2021 0526 sodium chloride 0 9 % infusion 150 mL/hr Intravenous New Bag     08/20/2021 2251 sodium chloride 0 9 % infusion 150 mL/hr Intravenous New Bag     08/20/2021 2127 lactated ringers bolus 1,000 mL 1,000 mL Intravenous New Bag     08/20/2021 2213 cefTRIAXone (ROCEPHIN) IVPB (premix in dextrose) 1,000 mg 50 mL 1,000 mg Intravenous New Bag     08/21/2021 0526 acetaminophen (TYLENOL) tablet 650 mg 650 mg Oral Given     08/21/2021 1042 oxyCODONE (ROXICODONE) IR tablet 5 mg 5 mg Oral Given     08/21/2021 0034 oxyCODONE (ROXICODONE) IR tablet 5 mg 5 mg Oral Given     08/21/2021 1041 gabapentin (NEURONTIN) capsule 600 mg 600 mg Oral Given     08/21/2021 1042 fluticasone (FLOVENT HFA) 110 MCG/ACT inhaler 2 puff 2 puff Inhalation Given     08/21/2021 0627 pantoprazole (PROTONIX) EC tablet 40 mg 40 mg Oral Given     08/21/2021 1041 topiramate (TOPAMAX) tablet 50 mg 50 mg Oral Given     08/21/2021 1042 enoxaparin (LOVENOX) subcutaneous injection 40 mg 40 mg Subcutaneous Given        Past Medical History:   Diagnosis Date    Arthritis     Asthma     [facility]     Fibromyalgia     Kidney stones      Present on Admission:   Pyelonephritis of left kidney   Sepsis with acute renal failure without septic shock ([facility] )   YONY (acute kidney injury) ([facility] )   Mild intermittent asthma without complication   Chronic pain syndrome      Admitting Diagnosis: Back pain [M54 9]  Leukocytosis [D72 829]  Urinary tract infection [N39 0]  Pyelonephritis [N12]  Fever [R50 9]  Hydronephrosis, left [N13 30]  YONY (acute kidney injury) ([facility] ) [N17 9]  Age/Sex: 46 y o  female  Admission Orders:  Scheduled Medications:  cefTRIAXone, 1,000 mg, Intravenous, Q24H  enoxaparin, 40 mg, Subcutaneous, Q24H [address]  fluticasone, 2 puff, Inhalation, Q12H [address]  gabapentin, 600 mg, Oral, BID  montelukast, 10 mg, Oral, HS  pantoprazole, 40 mg, Oral, Early Morning  topiramate, 50 mg, Oral, Q12H [address]      Continuous IV Infusions:  sodium chloride, 150 mL/hr, Intravenous, Continuous      PRN Meds:  acetaminophen, 650 mg, Oral, Q6H PRN  albuterol, 2 puff, Inhalation, Q4H PRN  HYDROmorphone, 0 5 mg, Intravenous, Q4H PRN  ondansetron, 4 mg, Intravenous, Q8H PRN  oxyCODONE, 5 mg, Oral, Q6H PRN      Network Utilization Review Department  ATTENTION: Please call with any questions or concerns to [phone] and carefully listen to the prompts so that you are directed to the right person  All voicemails are confidential   Xena Shen all requests for admission clinical reviews, approved or denied determinations and any other requests to dedicated fax number below belonging to the campus where the patient is receiving treatment   List of dedicated fax numbers for the Facilities:  [address] DENIALS (Administrative/Medical Necessity) [phone]   [address] (Maternity/NICU/Pediatrics) [phone] [address] [place] [address] [id number] [address]ando Tavares [address] [facility] [place] [address] [phone]

## 2021-08-23 NOTE — TELEPHONE ENCOUNTER
Contacted patient via phone  Patient not been on antibiotics  I started her on Macrobid and sent a prescription to her pharmacy and also sent a referral for Urology

## 2021-08-25 NOTE — QUICK NOTE
Urine Culture shows E coli    sensitive to levofloxacin and Macrobid-- patient signed AMA , bu a prescription of levofloxacin was sent to patients pharmacy on discharge  Patient also received a prescription of Mabrobid from ER provider

## 2021-08-26 LAB
BACTERIA BLD CULT: NORMAL
BACTERIA BLD CULT: NORMAL

## 2021-09-15 RX ORDER — HYDROCHLOROTHIAZIDE 25 MG/1
TABLET ORAL
Qty: 90 TABLET | Refills: 2 | Status: SHIPPED | OUTPATIENT
Start: 2021-09-15

## 2021-09-15 NOTE — TELEPHONE ENCOUNTER
Rx Refill Note  Requested Prescriptions     Pending Prescriptions Disp Refills   • hydroCHLOROthiazide (HYDRODIURIL) 25 MG tablet [Pharmacy Med Name: HYDROCHLOROTHIAZIDE 25MG TABLET] 90 tablet 2     Sig: TAKE ONE TABLET BY MOUTH DAILY FOR BLOOD PRESSURE      Last office visit with prescribing clinician: Visit date not found      Next office visit with prescribing clinician: Visit date not found            Chemo Alvarez MA  09/15/21, 15:36 EDT

## 2021-10-11 NOTE — PROGRESS NOTES
Physical Therapy Daily Progress Note        Visit # : 5  Cindy Salas reports: My leg has been feeling good - just occasional tightness or fullness after activity     Subjective     Objective       Palpation     Additional Palpation Details  No palpable increased muscle tone in left medial gastroc     See Exercise, Manual, and Modality Logs for complete treatment.       Assessment & Plan     Assessment  Assessment details: Presents with improved muscle tone left medial shin.  Overall improved subjective reports and less reports of cramping. Tolerated all ROM/ Strength progressions well today. Cont PT 2x per week for mobility and strength- next visit attempt  Step activity and/or leg press if tolerated.         Progress strengthening /stabilization /functional activity           Manual Therapy:    8     mins  39553;  Therapeutic Exercise:    14     mins  49254;     Neuromuscular [place]:        mins  35040;    Therapeutic Activity:     10     mins  47765;     Gait Training:           mins  26118;     Ultrasound:     8     mins  42714;    Electrical Stimulation:         mins  00389 ( );  Dry Needling          mins self-pay    Timed Treatment:   40   mins   Total Treatment:     50   mins    Yamilet Vallecillo, PT  Physical Therapist  [place] License # [id number]  
18

## 2022-10-12 PROBLEM — A41.9 SEPSIS WITH ACUTE RENAL FAILURE WITHOUT SEPTIC SHOCK (HCC): Status: RESOLVED | Noted: 2021-08-21 | Resolved: 2022-10-12

## 2022-10-12 PROBLEM — R65.20 SEPSIS WITH ACUTE RENAL FAILURE WITHOUT SEPTIC SHOCK (HCC): Status: RESOLVED | Noted: 2021-08-21 | Resolved: 2022-10-12

## 2022-10-12 PROBLEM — N12 PYELONEPHRITIS OF LEFT KIDNEY: Status: RESOLVED | Noted: 2021-08-20 | Resolved: 2022-10-12

## 2022-10-12 PROBLEM — N17.9 SEPSIS WITH ACUTE RENAL FAILURE WITHOUT SEPTIC SHOCK (HCC): Status: RESOLVED | Noted: 2021-08-21 | Resolved: 2022-10-12

## 2022-10-12 PROBLEM — N30.01 ACUTE CYSTITIS WITH HEMATURIA: Status: RESOLVED | Noted: 2020-07-23 | Resolved: 2022-10-12

## 2022-11-21 ENCOUNTER — APPOINTMENT (EMERGENCY)
Dept: CT IMAGING | Facility: HOSPITAL | Age: 52
End: 2022-11-21

## 2022-11-21 ENCOUNTER — HOSPITAL ENCOUNTER (EMERGENCY)
Facility: HOSPITAL | Age: 52
Discharge: HOME | End: 2022-11-21
Attending: EMERGENCY MEDICINE

## 2022-11-21 VITALS
TEMPERATURE: 97.5 F | HEART RATE: 92 BPM | DIASTOLIC BLOOD PRESSURE: 86 MMHG | RESPIRATION RATE: 18 BRPM | SYSTOLIC BLOOD PRESSURE: 153 MMHG | OXYGEN SATURATION: 99 %

## 2022-11-21 DIAGNOSIS — J01.00 ACUTE NON-RECURRENT MAXILLARY SINUSITIS: ICD-10-CM

## 2022-11-21 DIAGNOSIS — M27.3 DRY SOCKET: Primary | ICD-10-CM

## 2022-11-21 LAB
ANION GAP SERPL CALCULATED.3IONS-SCNC: 9 MMOL/L (ref 4–13)
BASOPHILS # BLD AUTO: 0.1 THOUSANDS/ÂΜL (ref 0–0.1)
BASOPHILS NFR BLD AUTO: 1 % (ref 0–1)
BUN SERPL-MCNC: 14 MG/DL (ref 5–25)
CALCIUM SERPL-MCNC: 9 MG/DL (ref 8.3–10.1)
CHLORIDE SERPL-SCNC: 108 MMOL/L (ref 96–108)
CO2 SERPL-SCNC: 24 MMOL/L (ref 21–32)
CREAT SERPL-MCNC: 1.3 MG/DL (ref 0.6–1.3)
EOSINOPHIL # BLD AUTO: 0.38 THOUSAND/ÂΜL (ref 0–0.61)
EOSINOPHIL NFR BLD AUTO: 4 % (ref 0–6)
ERYTHROCYTE [DISTWIDTH] IN BLOOD BY AUTOMATED COUNT: 13.4 % (ref 11.6–15.1)
GFR SERPL CREATININE-BSD FRML MDRD: 46 ML/MIN/1.73SQ M
GLUCOSE SERPL-MCNC: 113 MG/DL (ref 65–140)
HCT VFR BLD AUTO: 48.9 % (ref 34.8–46.1)
HGB BLD-MCNC: 15.8 G/DL (ref 11.5–15.4)
IMM GRANULOCYTES # BLD AUTO: 0.02 THOUSAND/UL (ref 0–0.2)
IMM GRANULOCYTES NFR BLD AUTO: 0 % (ref 0–2)
LYMPHOCYTES # BLD AUTO: 3.53 THOUSANDS/ÂΜL (ref 0.6–4.47)
LYMPHOCYTES NFR BLD AUTO: 33 % (ref 14–44)
MCH RBC QN AUTO: 28.7 PG (ref 26.8–34.3)
MCHC RBC AUTO-ENTMCNC: 32.3 G/DL (ref 31.4–37.4)
MCV RBC AUTO: 89 FL (ref 82–98)
MONOCYTES # BLD AUTO: 0.87 THOUSAND/ÂΜL (ref 0.17–1.22)
MONOCYTES NFR BLD AUTO: 8 % (ref 4–12)
NEUTROPHILS # BLD AUTO: 5.8 THOUSANDS/ÂΜL (ref 1.85–7.62)
NEUTS SEG NFR BLD AUTO: 54 % (ref 43–75)
NRBC BLD AUTO-RTO: 0 /100 WBCS
PLATELET # BLD AUTO: 255 THOUSANDS/UL (ref 149–390)
PMV BLD AUTO: 11.1 FL (ref 8.9–12.7)
POTASSIUM SERPL-SCNC: 3.9 MMOL/L (ref 3.5–5.3)
RBC # BLD AUTO: 5.51 MILLION/UL (ref 3.81–5.12)
SODIUM SERPL-SCNC: 141 MMOL/L (ref 135–147)
WBC # BLD AUTO: 10.7 THOUSAND/UL (ref 4.31–10.16)

## 2022-11-21 RX ORDER — FENTANYL CITRATE 50 UG/ML
50 INJECTION, SOLUTION INTRAMUSCULAR; INTRAVENOUS ONCE
Status: COMPLETED | OUTPATIENT
Start: 2022-11-21 | End: 2022-11-21

## 2022-11-21 RX ADMIN — FENTANYL CITRATE 50 MCG: 50 INJECTION INTRAMUSCULAR; INTRAVENOUS at 04:46

## 2022-11-21 NOTE — ED PROVIDER NOTES
History  Chief Complaint   Patient presents with   • Dental Pain     Reports she  had a tooth pulled on the top left and feels like they cracked the plate in her mouth  That her left side of her face and jaw hurts  History provided by:  Medical records and patient  Facial Injury  Injury mechanism: Dental extraction  Location:  Mouth and face  Mouth location:  Teeth  Tooth location:  14/YAZ 1st molar  Description of dental injury: Dental extraction by dentist 3 days prior to arrival, patient feels she heard cracking of her hard palate and believes she has a fracture  Time since incident:  3 days  Pain details:     Quality:  Aching and dull    Severity:  Severe    Duration:  3 days    Timing:  Constant    Progression:  Unchanged  Foreign body present:  No foreign bodies  Relieved by:  Nothing  Worsened by:  Cold, heat, movement and pressure  Ineffective treatments:  Acetaminophen  Associated symptoms: no altered mental status, no congestion, no difficulty breathing, no double vision, no ear pain, no headaches, no nausea, no rhinorrhea and no vomiting        Prior to Admission Medications   Prescriptions Last Dose Informant Patient Reported? Taking?    Cholecalciferol 25 MCG (1000 UT) tablet   Yes No   Sig: Take 1,000 Units by mouth daily   acetaminophen (TYLENOL) 325 mg tablet 11/20/2022  No Yes   Sig: Take 2 tablets (650 mg total) by mouth every 6 (six) hours as needed for mild pain, headaches or fever   albuterol (PROVENTIL HFA,VENTOLIN HFA) 90 mcg/act inhaler   Yes No   Sig: Inhale 2 puffs 3 (three) times a day as needed   cyanocobalamin (VITAMIN B-12) 100 MCG tablet   Yes No   Sig: Take 100 mcg by mouth daily   fluticasone (ARNUITY ELLIPTA) 200 MCG/ACT AEPB inhaler   Yes No   Sig: Inhale 1 puff daily   gabapentin (NEURONTIN) 600 MG tablet   Yes No   Sig: Take 600 mg by mouth 2 (two) times a day   levothyroxine 25 mcg tablet   Yes No   Sig: Take 25 mcg by mouth daily   Patient not taking: Reported on 8/20/2021   montelukast (SINGULAIR) 10 mg tablet   Yes No   Sig: Take 10 mg by mouth daily   nitrofurantoin (MACROBID) 100 mg capsule   No No   Sig: Take 1 capsule (100 mg total) by mouth 2 (two) times a day   pantoprazole (PROTONIX) 20 mg tablet   Yes No   Sig: Take 20 mg by mouth 2 (two) times a day   tamsulosin (FLOMAX) 0 4 mg   No No   Sig: Take 1 capsule (0 4 mg total) by mouth daily with dinner   Patient not taking: Reported on 2/25/2021   topiramate (TOPAMAX) 50 MG tablet   Yes No   Sig: Take 50 mg by mouth every 12 (twelve) hours      Facility-Administered Medications: None       Past Medical History:   Diagnosis Date   • Arthritis    • Asthma    • COPD (chronic obstructive pulmonary disease) (HCC)    • Fibromyalgia    • Kidney stones        Past Surgical History:   Procedure Laterality Date   • APPENDECTOMY     • FL RETROGRADE PYELOGRAM  2/25/2021   • JOINT REPLACEMENT      bilateral knee  • LIPOMA RESECTION     • NE CYSTOURETHROSCOPY,URETER CATHETER Right 2/25/2021    Procedure: CYSTOSCOPY RETROGRADE PYELOGRAM WITH INSERTION STENT URETERAL;  Surgeon: Terri Ambrose MD;  Location: [facility];  Service: Urology       Family History   Problem Relation Age of Onset   • Hypertension Father      I have reviewed and agree with the history as documented  E-Cigarette/Vaping   • E-Cigarette Use Never User      E-Cigarette/Vaping Substances     Social History     Tobacco Use   • Smoking status: Every Day     Packs/day: 0 50     Years: 30 00     Pack years: 15 00     Types: Cigarettes   • Smokeless tobacco: Never   Vaping Use   • Vaping Use: Never used   Substance Use Topics   • Alcohol use: Never   • Drug use: Yes     Types: Methamphetamines       Review of Systems   Constitutional: Negative for chills, fatigue and fever  HENT: Positive for dental problem  Negative for congestion, ear discharge, ear pain, rhinorrhea and sore throat  Eyes: Negative for double vision, pain and visual disturbance  Respiratory: Negative for cough and shortness of breath  Cardiovascular: Negative for chest pain and palpitations  Gastrointestinal: Negative for abdominal pain, diarrhea, nausea and vomiting  Endocrine: Negative for polydipsia, polyphagia and polyuria  Genitourinary: Negative for difficulty urinating, dysuria, flank pain and hematuria  Musculoskeletal: Negative for arthralgias and back pain  Skin: Negative for color change and rash  Allergic/Immunologic: Negative for immunocompromised state  Neurological: Negative for dizziness, seizures, syncope, weakness and headaches  Psychiatric/Behavioral: Negative for confusion and self-injury  The patient is not nervous/anxious  All other systems reviewed and are negative  Physical Exam  Physical Exam  Vitals and nursing note reviewed  Constitutional:       General: She is not in acute distress  Appearance: Normal appearance  She is not ill-appearing, toxic-appearing or diaphoretic  Comments: Smells of tobacco   HENT:      Head: Normocephalic and atraumatic  Nose: Nose normal  No congestion or rhinorrhea  Mouth/Throat:      Mouth: Mucous membranes are moist       Pharynx: Oropharynx is clear  No oropharyngeal exudate or posterior oropharyngeal erythema  Comments: Dental extraction site left upper molar region, there is some moderate swelling surrounding the extraction site, exquisitely tender to palpation  Patient with tenderness to the left maxillary region  No facial cellulitis  Eyes:      General:         Right eye: No discharge  Left eye: No discharge  Extraocular Movements: Extraocular movements intact  Conjunctiva/sclera: Conjunctivae normal       Pupils: Pupils are equal, round, and reactive to light  Cardiovascular:      Rate and Rhythm: Normal rate and regular rhythm  Pulses: Normal pulses  Heart sounds: Normal heart sounds  No murmur heard  No gallop     Pulmonary:      Effort: Pulmonary effort is normal  No respiratory distress  Breath sounds: Normal breath sounds  No stridor  No wheezing, rhonchi or rales  Chest:      Chest wall: No tenderness  Abdominal:      General: Bowel sounds are normal  There is no distension  Palpations: Abdomen is soft  There is no mass  Tenderness: There is no abdominal tenderness  There is no right CVA tenderness, left CVA tenderness, guarding or rebound  Hernia: No hernia is present  Musculoskeletal:         General: Normal range of motion  Cervical back: Normal range of motion and neck supple  Skin:     General: Skin is warm and dry  Capillary Refill: Capillary refill takes less than 2 seconds  Neurological:      General: No focal deficit present  Mental Status: She is alert and oriented to person, place, and time  Cranial Nerves: No cranial nerve deficit  Sensory: No sensory deficit  Motor: No weakness  Coordination: Coordination normal       Gait: Gait normal       Deep Tendon Reflexes: Reflexes normal    Psychiatric:         Mood and Affect: Mood normal          Behavior: Behavior normal          Thought Content:  Thought content normal          Judgment: Judgment normal          Vital Signs  ED Triage Vitals [11/21/22 0434]   Temperature Pulse Respirations Blood Pressure SpO2   97 5 °F (36 4 °C) 92 18 153/86 99 %      Temp Source Heart Rate Source Patient Position - Orthostatic VS BP Location FiO2 (%)   Temporal Monitor Sitting Left arm --      Pain Score       10 - Worst Possible Pain           Vitals:    11/21/22 0434   BP: 153/86   Pulse: 92   Patient Position - Orthostatic VS: Sitting         Visual Acuity      ED Medications  Medications   fentanyl citrate (PF) 100 MCG/2ML 50 mcg (50 mcg Intravenous Given 11/21/22 0446)       Diagnostic Studies  Results Reviewed     Procedure Component Value Units Date/Time    Basic metabolic panel [[id number]] Collected: 11/21/22 0448    Lab Status: Final result Specimen: Blood from Arm, Right Updated: 11/21/22 0502     Sodium 141 mmol/L      Potassium 3 9 mmol/L      Chloride 108 mmol/L      CO2 24 mmol/L      ANION GAP 9 mmol/L      BUN 14 mg/dL      Creatinine 1 30 mg/dL      Glucose 113 mg/dL      Calcium 9 0 mg/dL      eGFR 46 ml/min/1 73sq m     Narrative:      [place] guidelines for Chronic Kidney Disease (CKD):   •  Stage 1 with normal or high GFR (GFR > 90 mL/min/1 73 square meters)  •  Stage 2 Mild CKD (GFR = 60-89 mL/min/1 73 square meters)  •  Stage 3A Moderate CKD (GFR = 45-59 mL/min/1 73 square meters)  •  Stage 3B Moderate CKD (GFR = 30-44 mL/min/1 73 square meters)  •  Stage 4 Severe CKD (GFR = 15-29 mL/min/1 73 square meters)  •  Stage 5 End Stage CKD (GFR <15 mL/min/1 73 square meters)  Note: GFR calculation is accurate only with a steady state creatinine    CBC and differential [[id number]]  (Abnormal) Collected: 11/21/22 0448    Lab Status: Final result Specimen: Blood from Arm, Right Updated: 11/21/22 0454     WBC 10 70 Thousand/uL      RBC 5 51 Million/uL      Hemoglobin 15 8 g/dL      Hematocrit 48 9 %      MCV 89 fL      MCH 28 7 pg      MCHC 32 3 g/dL      RDW 13 4 %      MPV 11 1 fL      Platelets 938 Thousands/uL      nRBC 0 /100 WBCs      Neutrophils Relative 54 %      Immat GRANS % 0 %      Lymphocytes Relative 33 %      Monocytes Relative 8 %      Eosinophils Relative 4 %      Basophils Relative 1 %      Neutrophils Absolute 5 80 Thousands/µL      Immature Grans Absolute 0 02 Thousand/uL      Lymphocytes Absolute 3 53 Thousands/µL      Monocytes Absolute 0 87 Thousand/µL      Eosinophils Absolute 0 38 Thousand/µL      Basophils Absolute 0 10 Thousands/µL                  CT facial bones wo contrast   Final Result by Alyx Ward MD (11/21 1207)      No facial bone fracture or subluxation  No focal fluid collection to suggest an abscess  Mild mucosal thickening of the left maxillary sinus  Workstation performed: [id number]                    Procedures  Procedures         ED Course  ED Course as of 11/21/22 0605   Mon Nov 21, 2022   0430 0430:  Patient has pain out of proportion to exam   She is concerned she has a maxillary fracture from recent procedure  Left facial pain  There is no evidence of a malignant infection right off hand, however patient does have some decent swelling to the extraction site  I will complete basic labs, CT max face, give analgesics for her discomfort and re-evaluate  5312 1078:  CT and labs reviewed  Patient insists she feels a fracture, she points to the friable mucosal ridge, she has been informed that this is not a fracture no fractures identified on CT scan  Patient does admit to abusing methamphetamines  She is currently taking antibiotics from the dentist, informs of sinusitis, encouraged to finish course  SBIRT 20yo+    Flowsheet Row Most Recent Value   SBIRT (25 yo +)    In order to provide better care to our patients, we are screening all of our patients for alcohol and drug use  Would it be okay to ask you these screening questions? Yes Filed at: 11/21/2022 0456   Initial Alcohol Screen: US AUDIT-C     1  How often do you have a drink containing alcohol? 0 Filed at: 11/21/2022 0456   2  How many drinks containing alcohol do you have on a typical day you are drinking? 0 Filed at: 11/21/2022 0456   3a  Male UNDER 65: How often do you have five or more drinks on one occasion? 0 Filed at: 11/21/2022 0456   3b  FEMALE Any Age, or MALE 65+: How often do you have 4 or more drinks on one occassion? 0 Filed at: 11/21/2022 0456   Audit-C Score 0 Filed at: 11/21/2022 8813   PRINCE: How many times in the past year have you    Used an illegal drug or used a prescription medication for non-medical reasons?  Never Filed at: 11/21/2022 0456                    MDM    Disposition  Final diagnoses:   Dry socket   Acute non-recurrent maxillary sinusitis     Time reflects when diagnosis was documented in both MDM as applicable and the Disposition within this note     Time User Action Codes Description Comment    11/21/2022  5:58 AM [place] Isma Add [M27 3] Dry socket     11/21/2022  5:58 AM Rusty Isma Add [J01 00] Acute non-recurrent maxillary sinusitis       ED Disposition     ED Disposition   Discharge    Condition   Stable    Date/Time   Mon Nov 21, 2022  5:57 AM    Comment   Sanchez Part discharge to home/self care  Follow-up Information     Follow up With Specialties Details Why Contact Info    Ashley Rutledge DO Family Medicine Schedule an appointment as soon as possible for a visit   [address] [place] [address]  [phone]            Patient's Medications   Discharge Prescriptions    No medications on file       No discharge procedures on file      PDMP Review       Value Time User    PDMP Reviewed  Yes 3/1/2021 10:48 AM KM Mathews          ED Provider  Electronically Signed by           Aquiles Martin MD  11/21/22 4299

## 2022-12-06 ENCOUNTER — HOSPITAL ENCOUNTER (EMERGENCY)
Facility: HOSPITAL | Age: 52
Discharge: HOME | End: 2022-12-06
Attending: EMERGENCY MEDICINE

## 2022-12-06 ENCOUNTER — APPOINTMENT (EMERGENCY)
Dept: CT IMAGING | Facility: HOSPITAL | Age: 52
End: 2022-12-06

## 2022-12-06 VITALS
OXYGEN SATURATION: 96 % | BODY MASS INDEX: 43.61 KG/M2 | RESPIRATION RATE: 22 BRPM | HEART RATE: 99 BPM | DIASTOLIC BLOOD PRESSURE: 79 MMHG | TEMPERATURE: 97.3 F | WEIGHT: 237 LBS | HEIGHT: 62 IN | SYSTOLIC BLOOD PRESSURE: 116 MMHG

## 2022-12-06 DIAGNOSIS — F15.10 METHAMPHETAMINE ABUSE, EPISODIC (HCC): ICD-10-CM

## 2022-12-06 DIAGNOSIS — K04.7 DENTAL ABSCESS: Primary | ICD-10-CM

## 2022-12-06 DIAGNOSIS — J32.0 MAXILLARY SINUSITIS: ICD-10-CM

## 2022-12-06 LAB
ALBUMIN SERPL BCP-MCNC: 3.5 G/DL (ref 3.5–5)
ALP SERPL-CCNC: 107 U/L (ref 46–116)
ALT SERPL W P-5'-P-CCNC: 31 U/L (ref 12–78)
ANION GAP SERPL CALCULATED.3IONS-SCNC: 10 MMOL/L (ref 4–13)
AST SERPL W P-5'-P-CCNC: 24 U/L (ref 5–45)
BASOPHILS # BLD AUTO: 0.07 THOUSANDS/ÂΜL (ref 0–0.1)
BASOPHILS NFR BLD AUTO: 1 % (ref 0–1)
BILIRUB SERPL-MCNC: 0.51 MG/DL (ref 0.2–1)
BUN SERPL-MCNC: 13 MG/DL (ref 5–25)
CALCIUM SERPL-MCNC: 9.3 MG/DL (ref 8.3–10.1)
CHLORIDE SERPL-SCNC: 106 MMOL/L (ref 96–108)
CO2 SERPL-SCNC: 23 MMOL/L (ref 21–32)
CREAT SERPL-MCNC: 1.09 MG/DL (ref 0.6–1.3)
EOSINOPHIL # BLD AUTO: 0.27 THOUSAND/ÂΜL (ref 0–0.61)
EOSINOPHIL NFR BLD AUTO: 3 % (ref 0–6)
ERYTHROCYTE [DISTWIDTH] IN BLOOD BY AUTOMATED COUNT: 13 % (ref 11.6–15.1)
ETHANOL SERPL-MCNC: <3 MG/DL (ref 0–3)
FLUAV RNA RESP QL NAA+PROBE: NEGATIVE
FLUBV RNA RESP QL NAA+PROBE: NEGATIVE
GFR SERPL CREATININE-BSD FRML MDRD: 57 ML/MIN/1.73SQ M
GLUCOSE SERPL-MCNC: 99 MG/DL (ref 65–140)
HCT VFR BLD AUTO: 46.9 % (ref 34.8–46.1)
HGB BLD-MCNC: 15.2 G/DL (ref 11.5–15.4)
IMM GRANULOCYTES # BLD AUTO: 0.03 THOUSAND/UL (ref 0–0.2)
IMM GRANULOCYTES NFR BLD AUTO: 0 % (ref 0–2)
LYMPHOCYTES # BLD AUTO: 2.59 THOUSANDS/ÂΜL (ref 0.6–4.47)
LYMPHOCYTES NFR BLD AUTO: 27 % (ref 14–44)
MCH RBC QN AUTO: 28.6 PG (ref 26.8–34.3)
MCHC RBC AUTO-ENTMCNC: 32.4 G/DL (ref 31.4–37.4)
MCV RBC AUTO: 88 FL (ref 82–98)
MONOCYTES # BLD AUTO: 0.49 THOUSAND/ÂΜL (ref 0.17–1.22)
MONOCYTES NFR BLD AUTO: 5 % (ref 4–12)
NEUTROPHILS # BLD AUTO: 6.05 THOUSANDS/ÂΜL (ref 1.85–7.62)
NEUTS SEG NFR BLD AUTO: 64 % (ref 43–75)
NRBC BLD AUTO-RTO: 0 /100 WBCS
PLATELET # BLD AUTO: 231 THOUSANDS/UL (ref 149–390)
PMV BLD AUTO: 11.6 FL (ref 8.9–12.7)
POTASSIUM SERPL-SCNC: 3.8 MMOL/L (ref 3.5–5.3)
PROT SERPL-MCNC: 7.1 G/DL (ref 6.4–8.4)
RBC # BLD AUTO: 5.32 MILLION/UL (ref 3.81–5.12)
RSV RNA RESP QL NAA+PROBE: NEGATIVE
SARS-COV-2 RNA RESP QL NAA+PROBE: NEGATIVE
SODIUM SERPL-SCNC: 139 MMOL/L (ref 135–147)
TSH SERPL DL<=0.05 MIU/L-ACNC: 1.4 UIU/ML (ref 0.45–4.5)
WBC # BLD AUTO: 9.5 THOUSAND/UL (ref 4.31–10.16)

## 2022-12-06 RX ORDER — SULFAMETHOXAZOLE AND TRIMETHOPRIM 800; 160 MG/1; MG/1
1 TABLET ORAL 2 TIMES DAILY
Qty: 20 TABLET | Refills: 0 | Status: SHIPPED | OUTPATIENT
Start: 2022-12-06 | End: 2022-12-16

## 2022-12-06 RX ORDER — SULFAMETHOXAZOLE AND TRIMETHOPRIM 800; 160 MG/1; MG/1
1 TABLET ORAL ONCE
Status: COMPLETED | OUTPATIENT
Start: 2022-12-06 | End: 2022-12-06

## 2022-12-06 RX ORDER — MORPHINE SULFATE 4 MG/ML
4 INJECTION, SOLUTION INTRAMUSCULAR; INTRAVENOUS ONCE
Status: COMPLETED | OUTPATIENT
Start: 2022-12-06 | End: 2022-12-06

## 2022-12-06 RX ORDER — HALOPERIDOL 5 MG/ML
5 INJECTION INTRAMUSCULAR ONCE
Status: DISCONTINUED | OUTPATIENT
Start: 2022-12-06 | End: 2022-12-06

## 2022-12-06 RX ORDER — MORPHINE SULFATE 4 MG/ML
4 INJECTION, SOLUTION INTRAMUSCULAR; INTRAVENOUS ONCE
Status: DISCONTINUED | OUTPATIENT
Start: 2022-12-06 | End: 2022-12-06

## 2022-12-06 RX ADMIN — MORPHINE SULFATE 4 MG: 4 INJECTION INTRAVENOUS at 21:19

## 2022-12-06 RX ADMIN — SULFAMETHOXAZOLE AND TRIMETHOPRIM 1 TABLET: 800; 160 TABLET ORAL at 23:44

## 2022-12-07 NOTE — ED NOTES
Crisis contacted at this time, will call back to complete eval  At this time patient tearful and on the phone with friend  Patient reminded several times to not yell due to other patients being seen in ER        Maria E Stone RN  12/06/22 8402

## 2022-12-07 NOTE — ED NOTES
Pt refusing to give urine sample, pt got up and walked to bathroom again refusing to give sample     Lupis Wilson RN  12/06/22 5678

## 2022-12-07 NOTE — ED PROVIDER NOTES
History  Chief Complaint   Patient presents with   • Dental Pain     Patient had tooth pulled a month ago  Presents tonight w/ dental pain to every tooth in mouth stating they are all breaking  Patient is a 49-year-old female with history of schizoaffective disorder and methamphetamine abuse who presents to the emergency department via EMS for evaluation of left upper dental pain which started about 2 months ago the patient has poor dentition and cracked tooth in the area of the left upper jaw was seen by oral surgeon recently seen at outside hospital ED for same yesterday prescribed lidocaine  Patient presents to the emergency department tonight complaining of severe left upper dental pain  No fevers or chills no difficulty speaking or swelling of the neck or throat  History provided by:  Patient  Dental Pain  Location:  Upper  Upper teeth location:  14/YAZ 1st molar and 15/YAZ 2nd molar  Quality:  Aching and constant  Severity:  Moderate  Onset quality:  Gradual  Duration:  2 months  Timing:  Intermittent  Progression:  Worsening  Chronicity:  Chronic  Context: dental caries, dental fracture and poor dentition    Associated symptoms: no congestion, no fever and no headaches        Prior to Admission Medications   Prescriptions Last Dose Informant Patient Reported? Taking?    Cholecalciferol 25 MCG (1000 UT) tablet   Yes No   Sig: Take 1,000 Units by mouth daily   acetaminophen (TYLENOL) 325 mg tablet   No No   Sig: Take 2 tablets (650 mg total) by mouth every 6 (six) hours as needed for mild pain, headaches or fever   albuterol (PROVENTIL HFA,VENTOLIN HFA) 90 mcg/act inhaler   Yes No   Sig: Inhale 2 puffs 3 (three) times a day as needed   cyanocobalamin (VITAMIN B-12) 100 MCG tablet   Yes No   Sig: Take 100 mcg by mouth daily   fluticasone (ARNUITY ELLIPTA) 200 MCG/ACT AEPB inhaler   Yes No   Sig: Inhale 1 puff daily   gabapentin (NEURONTIN) 600 MG tablet   Yes No   Sig: Take 600 mg by mouth 2 (two) times a day   levothyroxine 25 mcg tablet   Yes No   Sig: Take 25 mcg by mouth daily   Patient not taking: Reported on 8/20/2021   montelukast (SINGULAIR) 10 mg tablet   Yes No   Sig: Take 10 mg by mouth daily   nitrofurantoin (MACROBID) 100 mg capsule   No No   Sig: Take 1 capsule (100 mg total) by mouth 2 (two) times a day   pantoprazole (PROTONIX) 20 mg tablet   Yes No   Sig: Take 20 mg by mouth 2 (two) times a day   tamsulosin (FLOMAX) 0 4 mg   No No   Sig: Take 1 capsule (0 4 mg total) by mouth daily with dinner   Patient not taking: Reported on 2/25/2021   topiramate (TOPAMAX) 50 MG tablet   Yes No   Sig: Take 50 mg by mouth every 12 (twelve) hours      Facility-Administered Medications: None       Past Medical History:   Diagnosis Date   • Arthritis    • Asthma    • COPD (chronic obstructive pulmonary disease) (HCC)    • Fibromyalgia    • Kidney stones        Past Surgical History:   Procedure Laterality Date   • APPENDECTOMY     • [place] RETROGRADE PYELOGRAM  2/25/2021   • JOINT REPLACEMENT      bilateral knee  • LIPOMA RESECTION     • [place] CYSTOURETHROSCOPY,URETER CATHETER Right 2/25/2021    Procedure: CYSTOSCOPY RETROGRADE PYELOGRAM WITH INSERTION STENT URETERAL;  Surgeon: Angela Lara MD;  Location: [facility];  Service: Urology       Family History   Problem Relation Age of Onset   • Hypertension Father      I have reviewed and agree with the history as documented  E-Cigarette/Vaping   • E-Cigarette Use Never User      E-Cigarette/Vaping Substances     Social History     Tobacco Use   • Smoking status: Every Day     Packs/day: 0 50     Years: 30 00     Pack years: 15 00     Types: Cigarettes   • Smokeless tobacco: Never   Vaping Use   • Vaping Use: Never used   Substance Use Topics   • Alcohol use: Never   • Drug use: Yes     Types: Methamphetamines       Review of Systems   Constitutional: Negative for activity change, appetite change, chills, fatigue and fever  HENT: Positive for dental problem  Negative for congestion, ear pain, rhinorrhea, sore throat, trouble swallowing and voice change  Eyes: Negative for discharge, redness and visual disturbance  Respiratory: Negative for cough, chest tightness, shortness of breath and wheezing  Cardiovascular: Negative for chest pain and palpitations  Gastrointestinal: Negative for abdominal pain, constipation, diarrhea, nausea and vomiting  Endocrine: Negative for polydipsia and polyuria  Genitourinary: Negative for difficulty urinating, dysuria, frequency, hematuria and urgency  Musculoskeletal: Negative for arthralgias and myalgias  Skin: Negative for color change, pallor and rash  Neurological: Negative for dizziness, weakness, light-headedness, numbness and headaches  Hematological: Negative for adenopathy  Does not bruise/bleed easily  All other systems reviewed and are negative  Physical Exam  Physical Exam  Vitals and nursing note reviewed  Constitutional:       Appearance: She is well-developed  HENT:      Head: Normocephalic and atraumatic  Right Ear: External ear normal       Left Ear: External ear normal       Nose: Nose normal       Mouth/Throat:      Dentition: Abnormal dentition  Dental tenderness, gingival swelling, dental caries and dental abscesses present  Eyes:      Conjunctiva/sclera: Conjunctivae normal       Pupils: Pupils are equal, round, and reactive to light  Cardiovascular:      Rate and Rhythm: Normal rate and regular rhythm  Heart sounds: Normal heart sounds  Pulmonary:      Effort: Pulmonary effort is normal  No respiratory distress  Breath sounds: Normal breath sounds  No wheezing or rales  Chest:      Chest wall: No tenderness  Abdominal:      General: Bowel sounds are normal  There is no distension  Palpations: Abdomen is soft  Tenderness: There is no abdominal tenderness  There is no guarding  Musculoskeletal:         General: Normal range of motion  Cervical back: Normal range of motion and neck supple  Skin:     General: Skin is warm and dry  Neurological:      Mental Status: She is alert and oriented to person, place, and time  Cranial Nerves: No cranial nerve deficit  Sensory: No sensory deficit  Psychiatric:         Mood and Affect: Mood is anxious  Vital Signs  ED Triage Vitals   Temperature Pulse Respirations Blood Pressure SpO2   12/06/22 2015 12/06/22 2015 12/06/22 2015 12/06/22 2019 12/06/22 2015   (!) 97 3 °F (36 3 °C) 99 22 116/79 99 %      Temp Source Heart Rate Source Patient Position - Orthostatic VS BP Location FiO2 (%)   12/06/22 2015 12/06/22 2015 12/06/22 2015 12/06/22 2015 --   Temporal Monitor Sitting Right arm       Pain Score       12/06/22 2015       10 - Worst Possible Pain           Vitals:    12/06/22 2015 12/06/22 2019   BP:  116/79   Pulse: 99    Patient Position - Orthostatic VS: Sitting          Visual Acuity      ED Medications  Medications   morphine injection 4 mg (4 mg Intravenous Given 12/6/22 2119)   sulfamethoxazole-trimethoprim (BACTRIM DS) 800-160 mg per tablet 1 tablet (1 tablet Oral Given 12/6/22 2344)       Diagnostic Studies  Results Reviewed     Procedure Component Value Units Date/Time    FLU/RSV/COVID - if FLU/RSV clinically relevant [[id number]]  (Normal) Collected: 12/06/22 2115    Lab Status: Final result Specimen: Nares from Nose Updated: 12/06/22 2213     SARS-CoV-2 Negative     INFLUENZA A PCR Negative     INFLUENZA B PCR Negative     RSV PCR Negative    Narrative:      FOR PEDIATRIC PATIENTS - copy/paste COVID Guidelines URL to browser: [email or web address]    SARS-CoV-2 assay is a Nucleic Acid Amplification assay intended for the  qualitative detection of nucleic acid from SARS-CoV-2 in nasopharyngeal  swabs  Results are for the presumptive identification of SARS-CoV-2 RNA      Positive results are indicative of infection with SARS-CoV-2, the virus  causing COVID-19, but do not rule out bacterial infection or co-infection  with other viruses  Laboratories within the [place] and its  territories are required to report all positive results to the appropriate  public health authorities  Negative results do not preclude SARS-CoV-2  infection and should not be used as the sole basis for treatment or other  patient management decisions  Negative results must be combined with  clinical observations, patient history, and epidemiological information  This test has not been FDA cleared or approved  This test has been authorized by FDA under an Emergency Use Authorization  (EUA)  This test is only authorized for the duration of time the  declaration that circumstances exist justifying the authorization of the  emergency use of an in vitro diagnostic tests for detection of SARS-CoV-2  virus and/or diagnosis of COVID-19 infection under section 564(b)(1) of  the Act, 21 U  S C  627ECZ-0(L)(7), unless the authorization is terminated  or revoked sooner  The test has been validated but independent review by FDA  and CLIA is pending  Test performed using [employer] GeneXpert: This RT-PCR assay targets N2,  a region unique to SARS-CoV-2  A conserved region in the E-gene was chosen  for pan-Sarbecovirus detection which includes SARS-CoV-2  According to CMS-2020-01-R, this platform meets the definition of high-throughput technology  Ethanol [[id number]]  (Normal) Collected: 12/06/22 2115    Lab Status: Final result Specimen: Blood from Arm, Right Updated: 12/06/22 2158     Ethanol Lvl <3 mg/dL     TSH [[id number]]  (Normal) Collected: 12/06/22 2115    Lab Status: Final result Specimen: Blood from Arm, Right Updated: 12/06/22 2153     TSH 3RD GENERATON 1 396 uIU/mL     Narrative:      Patients undergoing fluorescein dye angiography may retain small amounts of fluorescein in the body for 48-72 hours post procedure   Samples containing fluorescein can produce falsely depressed TSH values  If the patient had this procedure,a specimen should be resubmitted post fluorescein clearance        Comprehensive metabolic panel [[id number]] Collected: 12/06/22 2115    Lab Status: Final result Specimen: Blood from Arm, Right Updated: 12/06/22 2145     Sodium 139 mmol/L      Potassium 3 8 mmol/L      Chloride 106 mmol/L      CO2 23 mmol/L      ANION GAP 10 mmol/L      BUN 13 mg/dL      Creatinine 1 09 mg/dL      Glucose 99 mg/dL      Calcium 9 3 mg/dL      AST 24 U/L      ALT 31 U/L      Alkaline Phosphatase 107 U/L      Total Protein 7 1 g/dL      Albumin 3 5 g/dL      Total Bilirubin 0 51 mg/dL      eGFR 57 ml/min/1 73sq m     Narrative:      [place] guidelines for Chronic Kidney Disease (CKD):   •  Stage 1 with normal or high GFR (GFR > 90 mL/min/1 73 square meters)  •  Stage 2 Mild CKD (GFR = 60-89 mL/min/1 73 square meters)  •  Stage 3A Moderate CKD (GFR = 45-59 mL/min/1 73 square meters)  •  Stage 3B Moderate CKD (GFR = 30-44 mL/min/1 73 square meters)  •  Stage 4 Severe CKD (GFR = 15-29 mL/min/1 73 square meters)  •  Stage 5 End Stage CKD (GFR <15 mL/min/1 73 square meters)  Note: GFR calculation is accurate only with a steady state creatinine    CBC and differential [[id number]]  (Abnormal) Collected: 12/06/22 2115    Lab Status: Final result Specimen: Blood from Arm, Right Updated: 12/06/22 2126     WBC 9 50 Thousand/uL      RBC 5 32 Million/uL      Hemoglobin 15 2 g/dL      Hematocrit 46 9 %      MCV 88 fL      MCH 28 6 pg      MCHC 32 4 g/dL      RDW 13 0 %      MPV 11 6 fL      Platelets 080 Thousands/uL      nRBC 0 /100 WBCs      Neutrophils Relative 64 %      Immat GRANS % 0 %      Lymphocytes Relative 27 %      Monocytes Relative 5 %      Eosinophils Relative 3 %      Basophils Relative 1 %      Neutrophils Absolute 6 05 Thousands/µL      Immature Grans Absolute 0 03 Thousand/uL      Lymphocytes Absolute 2 59 Thousands/µL Monocytes Absolute 0 49 Thousand/µL      Eosinophils Absolute 0 27 Thousand/µL      Basophils Absolute 0 07 Thousands/µL     UA w Reflex to Microscopic w Reflex to Culture [[id number]]     Lab Status: No result Specimen: Urine     Rapid drug screen, urine [[id number]]     Lab Status: No result Specimen: Urine                  CT facial bones without contrast   Final Result by Stephan Fong MD (12/06 2225)         1  Small periapical abscess involving the left maxillary 3rd molar  No evidence of subperiosteal abscess or cellulitis  2   Mucosal thickening in the left maxillary sinus  Focal dehiscence of the floor of the sinus adjacent to the roots of the left maxillary 3rd molar suggesting odontogenic source of sinus disease  Workstation performed: [id number]                    Procedures  Procedures         ED Course  ED Course as of 12/06/22 2351   Tue Dec 06, 2022   2037 Recieved telephone call from [facility] crisis reporting that patient's family had contacted them seeking the petition and involuntary commitment against patient due to her making suicidal threats at home and cutting out her gums with scissors due to her dental pain  2045 Patient question by me in the emergency department in regards to suicidal and homicidal ideation she denies both and she is adamant that she only wants help for her dental issues  2319 Patient medically cleared in the ED  we will treat with Bactrim for dental abscess with extension into left maxillary sinus which is because of patient's dental pain and symptoms patient failed to take antibiotics prior due to allergy  Patient currently discussing with crisis  She is now much more calm cooperative and coherent she is very satisfied to know that she does have a significant infection in her left upper jaw as she had felt like prior evaluations have been suggesting she did not have a problem causing her pain and symptoms in upper jaw  MDM  Number of Diagnoses or Management Options  Dental abscess: new and requires workup  Maxillary sinusitis: new and requires workup  Methamphetamine abuse, episodic ([facility] [address] ): new and requires workup  Diagnosis management comments: She remained clinically hemodynamically neurologically stable in the emergency department she is afebrile nontoxic well-appearing work-up in the ED is consistent with odontogenic infection in the left upper jaw extending into the left maxillary sinus  Patient completed complete crisis evaluation in the emergency department and there were no grounds for involuntary commitment and patient was cleared for discharge patient refused to wait for any paperwork from crisis in the ED and after being cleared over the phone by crisis she was demanding to leave the ED she called a friend and obtained a ride home patient was prescribed Bactrim for dental infection and sinusitis and advised to follow-up with oral maxillofacial surgery for further evaluation and treatment of this infection referral was placed patient was encouraged to follow-up promptly for further evaluation and treatment also recommended prompt follow-up with primary physician  There were no grounds for involuntary commitment present patient was not willing to stay in the ED any further and wished to leave  Patient encouraged to follow-up promptly and prescribed antibiotics for dental and maxillary sinus infection  Return precautions and anticipatory as discussed           Amount and/or Complexity of Data Reviewed  Clinical lab tests: reviewed and ordered  Tests in the radiology section of CPT®: reviewed and ordered  Tests in the medicine section of CPT®: ordered and reviewed  Decide to obtain previous medical records or to obtain history from someone other than the patient: yes  Review and summarize past medical records: yes  Independent visualization of images, tracings, or specimens: yes    Risk of Complications, Morbidity, and/or Mortality  Presenting problems: moderate  Diagnostic procedures: moderate  Management options: moderate    Patient Progress  Patient progress: stable      Disposition  Final diagnoses:   Dental abscess   Maxillary sinusitis   Methamphetamine abuse, episodic ([address] )     Time reflects when diagnosis was documented in both MDM as applicable and the Disposition within this note     Time User Action Codes Description Comment    12/6/2022 10:43 PM Silvano Aparna Add [K04 7] Dental abscess     12/6/2022 10:44 PM Silvano Aparna Add [J32 0] Maxillary sinusitis     12/6/2022 11:41 PM Silvano Aparna Add [F15 10] Methamphetamine abuse, episodic [facility])       ED Disposition     ED Disposition   Discharge    Condition   Stable    Date/Time   Tue Dec 6, 2022 11:29 PM    Comment   Nanda Briones discharge to home/self care                 Follow-up Information     Follow up With Specialties Details Why [address] [place] [address] for Oral and Maxillofacial Surgery [place]  Schedule an appointment as soon as possible for a visit in 2 days  [address] [place] [address] Schedule an appointment as soon as possible for a visit in 2 days  [address] [place] [address]  [phone]            Discharge Medication List as of 12/6/2022 11:43 PM      START taking these medications    Details   sulfamethoxazole-trimethoprim (BACTRIM DS) 800-160 mg per tablet Take 1 tablet by mouth 2 (two) times a day for 10 days smx-tmp DS (BACTRIM) 800-160 mg tabs (1tab q12 D10), Starting Tue 12/6/2022, Until Fri 12/16/2022, Normal         CONTINUE these medications which have NOT CHANGED    Details   acetaminophen (TYLENOL) 325 mg tablet Take 2 tablets (650 mg total) by mouth every 6 (six) hours as needed for mild pain, headaches or fever, Starting Sun 7/26/2020, Normal      albuterol (PROVENTIL HFA,VENTOLIN HFA) 90 mcg/act inhaler Inhale 2 puffs 3 (three) times a day as needed, Starting Mon 1/28/2019, Historical Med      Cholecalciferol 25 MCG (1000 UT) tablet Take 1,000 Units by mouth daily, Historical Med      cyanocobalamin (VITAMIN B-12) 100 MCG tablet Take 100 mcg by mouth daily, Historical Med      fluticasone (ARNUITY ELLIPTA) 200 MCG/ACT AEPB inhaler Inhale 1 puff daily, Starting Mon 1/28/2019, Historical Med      gabapentin (NEURONTIN) 600 MG tablet Take 600 mg by mouth 2 (two) times a day, Historical Med      levothyroxine 25 mcg tablet Take 25 mcg by mouth daily, Starting Wed 1/22/2020, Historical Med      montelukast (SINGULAIR) 10 mg tablet Take 10 mg by mouth daily, Starting Fri 5/1/2020, Historical Med      nitrofurantoin (MACROBID) 100 mg capsule Take 1 capsule (100 mg total) by mouth 2 (two) times a day, Starting Mon 8/23/2021, Normal      pantoprazole (PROTONIX) 20 mg tablet Take 20 mg by mouth 2 (two) times a day, Historical Med      tamsulosin (FLOMAX) 0 4 mg Take 1 capsule (0 4 mg total) by mouth daily with dinner, Starting Sun 7/26/2020, Normal      topiramate (TOPAMAX) 50 MG tablet Take 50 mg by mouth every 12 (twelve) hours, Historical Med                 PDMP Review       Value Time User    PDMP Reviewed  Yes 3/1/2021 10:48 AM KM Bauitsta          ED Provider  Electronically Signed by           Musa Bowling DO  12/06/22 9953

## 2023-05-17 NOTE — TELEPHONE ENCOUNTER
PATIENT NOTIFIED   Spoke with patient, she stated that she usually can go up/down stairs without any issues, but starting yesterday her legs have been feeling weak, today when she tried to walk around the block, she felt like she could not make it home.  She was also sitting in her yard today when she felt dizzy/lightheaded and could barely make it inside.  She does not have the ability to check her BP/HR at home, admits to not eating and drinking well, had a 1/2 sandwich for lunch.  Advised symptom are not related to her recent amulet procedure, expressed concern that their is other issues causing her symptoms.  Recommended to check BP/HR, increase fluids, and eat.  If symptoms persist/progress, needs ED evaluation.  She verbalized understanding.

## 2023-06-20 ENCOUNTER — DOCTOR'S OFFICE (OUTPATIENT)
Dept: URBAN - NONMETROPOLITAN AREA CLINIC 1 | Facility: CLINIC | Age: 53
Setting detail: OPHTHALMOLOGY
End: 2023-06-20
Payer: COMMERCIAL

## 2023-06-20 ENCOUNTER — OPTICAL OFFICE (OUTPATIENT)
Dept: URBAN - NONMETROPOLITAN AREA CLINIC 4 | Facility: CLINIC | Age: 53
Setting detail: OPHTHALMOLOGY
End: 2023-06-20
Payer: COMMERCIAL

## 2023-06-20 DIAGNOSIS — H52.4: ICD-10-CM

## 2023-06-20 DIAGNOSIS — H52.223: ICD-10-CM

## 2023-06-20 PROBLEM — H25.013: Status: ACTIVE | Noted: 2023-06-20

## 2023-06-20 PROCEDURE — S0621 ROUTINE OPHTHALMOLOGICAL EXA: HCPCS | Performed by: OPTOMETRIST

## 2023-06-20 PROCEDURE — V2020 VISION SVCS FRAMES PURCHASES: HCPCS | Performed by: OPTOMETRIST

## 2023-06-20 PROCEDURE — 92014 COMPRE OPH EXAM EST PT 1/>: CPT | Performed by: OPTOMETRIST

## 2023-06-20 PROCEDURE — V2103 SPHEROCYLINDR 4.00D/12-2.00D: HCPCS | Performed by: OPTOMETRIST

## 2023-06-20 PROCEDURE — V2104 SPHEROCYLINDR 4.00D/2.12-4D: HCPCS | Performed by: OPTOMETRIST

## 2023-06-20 ASSESSMENT — REFRACTION_CURRENTRX
OS_VPRISM_DIRECTION: BF
OD_SPHERE: -0.75
OS_OVR_VA: 20/
OD_ADD: +2.50
OD_OVR_VA: 20/
OD_AXIS: 098
OS_ADD: +2.50
OD_CYLINDER: -3.00
OD_VPRISM_DIRECTION: BF
OS_SPHERE: -1.25
OS_CYLINDER: -2.50
OS_AXIS: 083

## 2023-06-20 ASSESSMENT — REFRACTION_MANIFEST
OS_SPHERE: -1.25
OD_SPHERE: -1.00
OD_AXIS: 085
OS_ADD: +2.50
OS_VA2: 20/25-2
OD_VA2: 20/25-2
OD_VA1: 20/25-2
OS_CYLINDER: -2.25
OD_CYLINDER: -1.50
OD_ADD: +2.50
OS_VA1: 20/25-2
OS_AXIS: 080

## 2023-06-20 ASSESSMENT — REFRACTION_AUTOREFRACTION
OD_CYLINDER: -1.50
OD_SPHERE: -0.75
OS_CYLINDER: -2.25
OS_AXIS: 082
OS_SPHERE: -1.00
OD_AXIS: 083

## 2023-06-20 ASSESSMENT — VISUAL ACUITY
OD_BCVA: 20/150
OS_BCVA: 20/70-2

## 2023-06-20 ASSESSMENT — SPHEQUIV_DERIVED
OS_SPHEQUIV: -2.125
OD_SPHEQUIV: -1.5
OS_SPHEQUIV: -2.375
OD_SPHEQUIV: -1.75

## 2023-06-20 ASSESSMENT — CONFRONTATIONAL VISUAL FIELD TEST (CVF)
OS_FINDINGS: FULL
OD_FINDINGS: FULL

## 2023-06-20 ASSESSMENT — TONOMETRY
OS_IOP_MMHG: 15
OD_IOP_MMHG: 15

## 2023-12-21 ENCOUNTER — APPOINTMENT (RX ONLY)
Dept: URBAN - METROPOLITAN AREA CLINIC 156 | Facility: CLINIC | Age: 53
Setting detail: DERMATOLOGY
End: 2023-12-21

## 2023-12-21 DIAGNOSIS — L81.4 OTHER MELANIN HYPERPIGMENTATION: ICD-10-CM

## 2023-12-21 DIAGNOSIS — D22 MELANOCYTIC NEVI: ICD-10-CM

## 2023-12-21 DIAGNOSIS — L82.1 OTHER SEBORRHEIC KERATOSIS: ICD-10-CM

## 2023-12-21 DIAGNOSIS — L68.0 HIRSUTISM: ICD-10-CM | Status: INADEQUATELY CONTROLLED

## 2023-12-21 DIAGNOSIS — Z86.006 PERSONAL HISTORY OF MELANOMA IN-SITU: ICD-10-CM | Status: STABLE

## 2023-12-21 DIAGNOSIS — Z71.89 OTHER SPECIFIED COUNSELING: ICD-10-CM

## 2023-12-21 DIAGNOSIS — L57.8 OTHER SKIN CHANGES DUE TO CHRONIC EXPOSURE TO NONIONIZING RADIATION: ICD-10-CM | Status: INADEQUATELY CONTROLLED

## 2023-12-21 PROBLEM — D48.5 NEOPLASM OF UNCERTAIN BEHAVIOR OF SKIN: Status: ACTIVE | Noted: 2023-12-21

## 2023-12-21 PROBLEM — D22.5 MELANOCYTIC NEVI OF TRUNK: Status: ACTIVE | Noted: 2023-12-21

## 2023-12-21 PROCEDURE — ? SUNSCREEN RECOMMENDATIONS

## 2023-12-21 PROCEDURE — ? PRESCRIPTION

## 2023-12-21 PROCEDURE — ? PRESCRIPTION MEDICATION MANAGEMENT

## 2023-12-21 PROCEDURE — ? COUNSELING

## 2023-12-21 PROCEDURE — ? BIOPSY BY SHAVE METHOD

## 2023-12-21 PROCEDURE — 11102 TANGNTL BX SKIN SINGLE LES: CPT

## 2023-12-21 PROCEDURE — 11103 TANGNTL BX SKIN EA SEP/ADDL: CPT

## 2023-12-21 PROCEDURE — 99204 OFFICE O/P NEW MOD 45 MIN: CPT | Mod: 25

## 2023-12-21 RX ORDER — EFLORNITHINE HYDROCHLORIDE 139 MG/G
CREAM TOPICAL BID
Qty: 45 | Refills: 3 | Status: ERX | COMMUNITY
Start: 2023-12-21

## 2023-12-21 RX ADMIN — EFLORNITHINE HYDROCHLORIDE: 139 CREAM TOPICAL at 00:00

## 2023-12-21 ASSESSMENT — LOCATION DETAILED DESCRIPTION DERM
LOCATION DETAILED: RIGHT DISTAL PRETIBIAL REGION
LOCATION DETAILED: LEFT CENTRAL MALAR CHEEK
LOCATION DETAILED: LEFT MEDIAL SUPERIOR CHEST
LOCATION DETAILED: LEFT POSTERIOR SHOULDER
LOCATION DETAILED: LEFT DISTAL PRETIBIAL REGION
LOCATION DETAILED: LEFT ANTERIOR DISTAL THIGH
LOCATION DETAILED: RIGHT KNEE
LOCATION DETAILED: RIGHT INFERIOR CENTRAL MALAR CHEEK
LOCATION DETAILED: LEFT PROXIMAL DORSAL FOREARM
LOCATION DETAILED: RIGHT INFERIOR UPPER BACK
LOCATION DETAILED: RIGHT POSTERIOR SHOULDER
LOCATION DETAILED: SUPERIOR THORACIC SPINE
LOCATION DETAILED: RIGHT PROXIMAL RADIAL DORSAL FOREARM
LOCATION DETAILED: LEFT MEDIAL FOREHEAD
LOCATION DETAILED: LEFT DISTAL DORSAL FOREARM
LOCATION DETAILED: RIGHT ANTERIOR DISTAL THIGH
LOCATION DETAILED: RIGHT MID-UPPER BACK
LOCATION DETAILED: SUBMENTAL CHIN

## 2023-12-21 ASSESSMENT — LOCATION SIMPLE DESCRIPTION DERM
LOCATION SIMPLE: LEFT THIGH
LOCATION SIMPLE: RIGHT KNEE
LOCATION SIMPLE: LEFT FOREARM
LOCATION SIMPLE: RIGHT PRETIBIAL REGION
LOCATION SIMPLE: RIGHT THIGH
LOCATION SIMPLE: LEFT PRETIBIAL REGION
LOCATION SIMPLE: LEFT FOREHEAD
LOCATION SIMPLE: UPPER BACK
LOCATION SIMPLE: LEFT CHEEK
LOCATION SIMPLE: SUBMENTAL CHIN
LOCATION SIMPLE: RIGHT CHEEK
LOCATION SIMPLE: CHEST
LOCATION SIMPLE: RIGHT SHOULDER
LOCATION SIMPLE: RIGHT FOREARM
LOCATION SIMPLE: LEFT SHOULDER
LOCATION SIMPLE: RIGHT UPPER BACK

## 2023-12-21 ASSESSMENT — LOCATION ZONE DERM
LOCATION ZONE: LEG
LOCATION ZONE: FACE
LOCATION ZONE: TRUNK
LOCATION ZONE: ARM

## 2023-12-21 NOTE — PROCEDURE: PRESCRIPTION MEDICATION MANAGEMENT
Plan: Patient will wait until biopsy results obtained to start chemo cream.
Detail Level: Zone
Render In Strict Bullet Format?: No
Initiate Treatment: 5fu/Calcipotriene compound cream
Initiate Treatment: Eflornithine- apply BID

## 2023-12-21 NOTE — PROCEDURE: BIOPSY BY SHAVE METHOD
Detail Level: Detailed
Depth Of Biopsy: dermis
Was A Bandage Applied: Yes
Size Of Lesion In Cm: 0.3
X Size Of Lesion In Cm: 0
Biopsy Type: H and E
Biopsy Method: Dermablade
Anesthesia Type: 1% lidocaine without epinephrine
Anesthesia Volume In Cc (Will Not Render If 0): 0.5
Hemostasis: Electrocautery
Wound Care: Aquaphor
Dressing: bandage
Destruction After The Procedure: No
Type Of Destruction Used: Curettage
Curettage Text: The wound bed was treated with curettage after the biopsy was performed.
Cryotherapy Text: The wound bed was treated with cryotherapy after the biopsy was performed.
Electrodesiccation Text: The wound bed was treated with electrodesiccation after the biopsy was performed.
Electrodesiccation And Curettage Text: The wound bed was treated with electrodesiccation and curettage after the biopsy was performed.
Silver Nitrate Text: The wound bed was treated with silver nitrate after the biopsy was performed.
Lab: [id number]
Consent: Written consent was obtained and risks were reviewed including but not limited to scarring, infection, bleeding, scabbing, incomplete removal, nerve damage and allergy to anesthesia.
Post-Care Instructions: I reviewed with the patient in detail post-care instructions. Patient is to keep the biopsy site dry overnight, and then apply bacitracin twice daily until healed. Patient may apply hydrogen peroxide soaks to remove any crusting.
Notification Instructions: Patient will be notified of biopsy results. However, patient instructed to call the office if not contacted within 2 weeks.
Billing Type: Third-Party Bill
Information: Selecting Yes will display possible errors in your note based on the variables you have selected. This validation is only offered as a suggestion for you. PLEASE NOTE THAT THE VALIDATION TEXT WILL BE REMOVED WHEN YOU FINALIZE YOUR NOTE. IF YOU WANT TO FAX A PRELIMINARY NOTE YOU WILL NEED TO TOGGLE THIS TO 'NO' IF YOU DO NOT WANT IT IN YOUR FAXED NOTE.
Size Of Lesion In Cm: 0.4
Lab: [id number]
Billing Type: Third-Party Bill

## 2024-01-04 NOTE — ASSESSMENT & PLAN NOTE
Patient has known history of fibromyalgia and chronic pain syndrome  Continue home regimen of Topamax, Neurontin  27.9

## 2024-02-08 ENCOUNTER — APPOINTMENT (RX ONLY)
Dept: URBAN - METROPOLITAN AREA CLINIC 156 | Facility: CLINIC | Age: 54
Setting detail: DERMATOLOGY
End: 2024-02-08

## 2024-02-08 DIAGNOSIS — D485 NEOPLASM OF UNCERTAIN BEHAVIOR OF SKIN: ICD-10-CM | Status: INADEQUATELY CONTROLLED

## 2024-02-08 PROBLEM — D48.5 NEOPLASM OF UNCERTAIN BEHAVIOR OF SKIN: Status: ACTIVE | Noted: 2024-02-08

## 2024-02-08 PROCEDURE — ? PRESCRIPTION

## 2024-02-08 PROCEDURE — 11402 EXC TR-EXT B9+MARG 1.1-2 CM: CPT

## 2024-02-08 PROCEDURE — ? EXCISION

## 2024-02-08 RX ORDER — MUPIROCIN 20 MG/G
OINTMENT TOPICAL
Qty: 22 | Refills: 1 | Status: ERX | COMMUNITY
Start: 2024-02-08

## 2024-02-08 RX ADMIN — MUPIROCIN: 20 OINTMENT TOPICAL at 00:00

## 2024-02-08 ASSESSMENT — LOCATION DETAILED DESCRIPTION DERM: LOCATION DETAILED: RIGHT DISTAL PRETIBIAL REGION

## 2024-02-08 ASSESSMENT — LOCATION SIMPLE DESCRIPTION DERM: LOCATION SIMPLE: RIGHT PRETIBIAL REGION

## 2024-02-08 ASSESSMENT — LOCATION ZONE DERM: LOCATION ZONE: LEG

## 2024-02-08 NOTE — PROCEDURE: EXCISION

## 2024-06-06 ENCOUNTER — RX ONLY (OUTPATIENT)
Age: 54
Setting detail: RX ONLY
End: 2024-06-06

## 2024-06-06 ENCOUNTER — APPOINTMENT (RX ONLY)
Dept: URBAN - METROPOLITAN AREA CLINIC 156 | Facility: CLINIC | Age: 54
Setting detail: DERMATOLOGY
End: 2024-06-06

## 2024-06-06 DIAGNOSIS — Z86.006 PERSONAL HISTORY OF MELANOMA IN-SITU: ICD-10-CM | Status: STABLE

## 2024-06-06 DIAGNOSIS — Z87.2 PERSONAL HISTORY OF DISEASES OF THE SKIN AND SUBCUTANEOUS TISSUE: ICD-10-CM | Status: STABLE

## 2024-06-06 PROBLEM — D48.5 NEOPLASM OF UNCERTAIN BEHAVIOR OF SKIN: Status: ACTIVE | Noted: 2024-06-06

## 2024-06-06 PROCEDURE — ? COUNSELING

## 2024-06-06 PROCEDURE — ? BIOPSY BY SHAVE METHOD

## 2024-06-06 PROCEDURE — 99213 OFFICE O/P EST LOW 20 MIN: CPT | Mod: 25

## 2024-06-06 PROCEDURE — 69100 BIOPSY OF EXTERNAL EAR: CPT

## 2024-06-06 RX ORDER — EFLORNITHINE HYDROCHLORIDE 139 MG/G
CREAM TOPICAL BID
Qty: 45 | Refills: 3 | Status: CANCELLED

## 2024-06-06 ASSESSMENT — LOCATION SIMPLE DESCRIPTION DERM
LOCATION SIMPLE: RIGHT PRETIBIAL REGION
LOCATION SIMPLE: UPPER BACK

## 2024-06-06 ASSESSMENT — LOCATION DETAILED DESCRIPTION DERM
LOCATION DETAILED: RIGHT DISTAL PRETIBIAL REGION
LOCATION DETAILED: SUPERIOR THORACIC SPINE

## 2024-06-06 ASSESSMENT — LOCATION ZONE DERM
LOCATION ZONE: LEG
LOCATION ZONE: TRUNK

## 2024-06-06 NOTE — PROCEDURE: BIOPSY BY SHAVE METHOD
Detail Level: Detailed
Depth Of Biopsy: dermis
Was A Bandage Applied: Yes
Size Of Lesion In Cm: 0.5
X Size Of Lesion In Cm: 0
Biopsy Type: H and E
Biopsy Method: double edge Personna blade
Anesthesia Type: 1% lidocaine without epinephrine
Hemostasis: Electrocautery
Wound Care: Aquaphor
Dressing: Band-Aid
Destruction After The Procedure: No
Type Of Destruction Used: Curettage
Curettage Text: The wound bed was treated with curettage after the biopsy was performed.
Cryotherapy Text: The wound bed was treated with cryotherapy after the biopsy was performed.
Electrodesiccation Text: The wound bed was treated with electrodesiccation after the biopsy was performed.
Electrodesiccation And Curettage Text: The wound bed was treated with electrodesiccation and curettage after the biopsy was performed.
Silver Nitrate Text: The wound bed was treated with silver nitrate after the biopsy was performed.
Lab: [id number]
Consent: Written consent was obtained and risks were reviewed including but not limited to scarring, infection, bleeding, scabbing, incomplete removal, nerve damage and allergy to anesthesia.
Post-Care Instructions: I reviewed with the patient in detail post-care instructions. Patient is to keep the biopsy site dry overnight, and then apply Aquaphor twice daily until healed. Patient may apply hydrogen peroxide soaks to remove any crusting.
Notification Instructions: Patient will be notified of biopsy results. However, patient instructed to call the office if not contacted within 2 weeks.
Billing Type: Third-Party Bill
Information: Selecting Yes will display possible errors in your note based on the variables you have selected. This validation is only offered as a suggestion for you. PLEASE NOTE THAT THE VALIDATION TEXT WILL BE REMOVED WHEN YOU FINALIZE YOUR NOTE. IF YOU WANT TO FAX A PRELIMINARY NOTE YOU WILL NEED TO TOGGLE THIS TO 'NO' IF YOU DO NOT WANT IT IN YOUR FAXED NOTE.

## 2024-08-12 ENCOUNTER — RX ONLY (OUTPATIENT)
Age: 54
Setting detail: RX ONLY
End: 2024-08-12

## 2024-08-12 RX ORDER — EFLORNITHINE HYDROCHLORIDE 139 MG/G
CREAM TOPICAL BID
Qty: 45 | Refills: 3 | Status: CANCELLED
Stop reason: CLARIF

## 2024-08-13 ENCOUNTER — RX ONLY (OUTPATIENT)
Age: 54
Setting detail: RX ONLY
End: 2024-08-13

## 2024-12-17 ENCOUNTER — RX ONLY (RX ONLY)
Age: 54
End: 2024-12-17

## 2024-12-17 ENCOUNTER — APPOINTMENT (OUTPATIENT)
Dept: URBAN - METROPOLITAN AREA CLINIC 156 | Facility: CLINIC | Age: 54
Setting detail: DERMATOLOGY
End: 2024-12-17

## 2024-12-17 DIAGNOSIS — D22 MELANOCYTIC NEVI: ICD-10-CM | Status: STABLE

## 2024-12-17 DIAGNOSIS — L81.4 OTHER MELANIN HYPERPIGMENTATION: ICD-10-CM | Status: STABLE

## 2024-12-17 DIAGNOSIS — L82.1 OTHER SEBORRHEIC KERATOSIS: ICD-10-CM | Status: STABLE

## 2024-12-17 DIAGNOSIS — L81.5 LEUKODERMA, NOT ELSEWHERE CLASSIFIED: ICD-10-CM | Status: STABLE

## 2024-12-17 DIAGNOSIS — Z87.2 PERSONAL HISTORY OF DISEASES OF THE SKIN AND SUBCUTANEOUS TISSUE: ICD-10-CM | Status: STABLE

## 2024-12-17 DIAGNOSIS — Z86.006 PERSONAL HISTORY OF MELANOMA IN-SITU: ICD-10-CM | Status: STABLE

## 2024-12-17 DIAGNOSIS — L57.8 OTHER SKIN CHANGES DUE TO CHRONIC EXPOSURE TO NONIONIZING RADIATION: ICD-10-CM | Status: STABLE

## 2024-12-17 PROBLEM — D48.5 NEOPLASM OF UNCERTAIN BEHAVIOR OF SKIN: Status: ACTIVE | Noted: 2024-12-17

## 2024-12-17 PROBLEM — D22.5 MELANOCYTIC NEVI OF TRUNK: Status: ACTIVE | Noted: 2024-12-17

## 2024-12-17 PROBLEM — D23.72 OTHER BENIGN NEOPLASM OF SKIN OF LEFT LOWER LIMB, INCLUDING HIP: Status: ACTIVE | Noted: 2024-12-17

## 2024-12-17 PROCEDURE — ? SUNSCREEN RECOMMENDATIONS

## 2024-12-17 PROCEDURE — ? COUNSELING

## 2024-12-17 PROCEDURE — ? BIOPSY BY SHAVE METHOD

## 2024-12-17 PROCEDURE — 99213 OFFICE O/P EST LOW 20 MIN: CPT | Mod: 25

## 2024-12-17 PROCEDURE — 11102 TANGNTL BX SKIN SINGLE LES: CPT

## 2024-12-17 PROCEDURE — ? EDUCATIONAL RESOURCES PROVIDED

## 2024-12-17 ASSESSMENT — LOCATION SIMPLE DESCRIPTION DERM
LOCATION SIMPLE: RIGHT SHOULDER
LOCATION SIMPLE: RIGHT UPPER BACK
LOCATION SIMPLE: LEFT CHEEK
LOCATION SIMPLE: LEFT POSTERIOR UPPER ARM
LOCATION SIMPLE: LEFT HAND
LOCATION SIMPLE: ABDOMEN
LOCATION SIMPLE: CHEST
LOCATION SIMPLE: RIGHT POSTERIOR UPPER ARM
LOCATION SIMPLE: RIGHT CHEEK
LOCATION SIMPLE: LEFT PRETIBIAL REGION
LOCATION SIMPLE: UPPER BACK
LOCATION SIMPLE: LEFT SHOULDER
LOCATION SIMPLE: LEFT FOREARM
LOCATION SIMPLE: RIGHT PRETIBIAL REGION
LOCATION SIMPLE: RIGHT HAND
LOCATION SIMPLE: RIGHT FOREARM

## 2024-12-17 ASSESSMENT — LOCATION DETAILED DESCRIPTION DERM
LOCATION DETAILED: LEFT POSTERIOR SHOULDER
LOCATION DETAILED: RIGHT DISTAL PRETIBIAL REGION
LOCATION DETAILED: SUPERIOR THORACIC SPINE
LOCATION DETAILED: RIGHT MEDIAL SUPERIOR CHEST
LOCATION DETAILED: LEFT SUPERIOR LATERAL MALAR CHEEK
LOCATION DETAILED: RIGHT POSTERIOR SHOULDER
LOCATION DETAILED: RIGHT MID-UPPER BACK
LOCATION DETAILED: RIGHT SUPERIOR LATERAL MALAR CHEEK
LOCATION DETAILED: RIGHT PROXIMAL POSTERIOR UPPER ARM
LOCATION DETAILED: RIGHT INFERIOR MEDIAL UPPER BACK
LOCATION DETAILED: LEFT RADIAL DORSAL HAND
LOCATION DETAILED: LEFT DISTAL PRETIBIAL REGION
LOCATION DETAILED: RIGHT PROXIMAL DORSAL FOREARM
LOCATION DETAILED: RIGHT INFERIOR CENTRAL MALAR CHEEK
LOCATION DETAILED: RIGHT RADIAL DORSAL HAND
LOCATION DETAILED: LEFT PROXIMAL DORSAL FOREARM
LOCATION DETAILED: LEFT PROXIMAL POSTERIOR UPPER ARM
LOCATION DETAILED: LEFT INFERIOR CENTRAL MALAR CHEEK
LOCATION DETAILED: EPIGASTRIC SKIN

## 2024-12-17 ASSESSMENT — LOCATION ZONE DERM
LOCATION ZONE: FACE
LOCATION ZONE: HAND
LOCATION ZONE: ARM
LOCATION ZONE: LEG
LOCATION ZONE: TRUNK

## 2024-12-17 NOTE — HPI: EVALUATION OF SKIN LESION(S)
What Type Of Note Output Would You Prefer (Optional)?: Standard Output
How Severe Are Your Spot(S)?: mild
Have Your Spot(S) Been Treated In The Past?: has not been treated
Hpi Title: Evaluation of Skin Lesions
Location: Left upper back
Year Removed: 2004

## 2024-12-17 NOTE — PROCEDURE: COUNSELING
Detail Level: Detailed
Quality 137: Melanoma: Continuity Of Care - Recall System: Patient information entered into a recall system that includes: target date for the next exam specified AND a process to follow up with patients regarding missed or unscheduled appointments
Nicotinamide Supplementation Recommendations: Recommended [employer] Advanced
Detail Level: Simple

## 2024-12-17 NOTE — PROCEDURE: BIOPSY BY SHAVE METHOD
Detail Level: Detailed
Depth Of Biopsy: dermis
Was A Bandage Applied: Yes
Size Of Lesion In Cm: 0.4
X Size Of Lesion In Cm: 0
Biopsy Type: H and E
Biopsy Method: Dermablade
Anesthesia Type: 1% lidocaine without epinephrine
Anesthesia Volume In Cc: 0.5
Hemostasis: Electrocautery
Wound Care: Aquaphor
Dressing: bandage
Destruction After The Procedure: No
Type Of Destruction Used: Curettage
Curettage Text: The wound bed was treated with curettage after the biopsy was performed.
Cryotherapy Text: The wound bed was treated with cryotherapy after the biopsy was performed.
Electrodesiccation Text: The wound bed was treated with electrodesiccation after the biopsy was performed.
Electrodesiccation And Curettage Text: The wound bed was treated with electrodesiccation and curettage after the biopsy was performed.
Silver Nitrate Text: The wound bed was treated with silver nitrate after the biopsy was performed.
Lab: [id number]
Medical Necessity Information: It is in your best interest to select a reason for this procedure from the list below. All of these items fulfill various CMS LCD requirements except the new and changing color options.
Consent: Written consent was obtained and risks were reviewed including but not limited to scarring, infection, bleeding, scabbing, incomplete removal, nerve damage and allergy to anesthesia.
Post-Care Instructions: I reviewed with the patient in detail post-care instructions. Patient is to keep the biopsy site dry overnight, and then apply bacitracin twice daily until healed. Patient may apply hydrogen peroxide soaks to remove any crusting.
Notification Instructions: Patient will be notified of biopsy results. However, patient instructed to call the office if not contacted within 2 weeks.
Billing Type: Third-Party Bill
Information: Selecting Yes will display possible errors in your note based on the variables you have selected. This validation is only offered as a suggestion for you. PLEASE NOTE THAT THE VALIDATION TEXT WILL BE REMOVED WHEN YOU FINALIZE YOUR NOTE. IF YOU WANT TO FAX A PRELIMINARY NOTE YOU WILL NEED TO TOGGLE THIS TO 'NO' IF YOU DO NOT WANT IT IN YOUR FAXED NOTE.

## 2024-12-20 ENCOUNTER — RX ONLY (RX ONLY)
Age: 54
End: 2024-12-20

## 2024-12-30 ENCOUNTER — APPOINTMENT (OUTPATIENT)
Dept: URBAN - METROPOLITAN AREA CLINIC 155 | Facility: CLINIC | Age: 54
Setting detail: DERMATOLOGY
End: 2024-12-30

## 2024-12-30 DIAGNOSIS — D485 NEOPLASM OF UNCERTAIN BEHAVIOR OF SKIN: ICD-10-CM | Status: INADEQUATELY CONTROLLED

## 2024-12-30 PROBLEM — D48.5 NEOPLASM OF UNCERTAIN BEHAVIOR OF SKIN: Status: ACTIVE | Noted: 2024-12-30

## 2024-12-30 PROCEDURE — ? EXCISION

## 2024-12-30 PROCEDURE — ? COUNSELING

## 2024-12-30 PROCEDURE — ? PRESCRIPTION

## 2024-12-30 PROCEDURE — 99213 OFFICE O/P EST LOW 20 MIN: CPT | Mod: 25

## 2024-12-30 PROCEDURE — ? SEPARATE AND IDENTIFIABLE DOCUMENTATION

## 2024-12-30 PROCEDURE — ? CONSULTATION EXCISION

## 2024-12-30 PROCEDURE — 13121 CMPLX RPR S/A/L 2.6-7.5 CM: CPT

## 2024-12-30 PROCEDURE — 11402 EXC TR-EXT B9+MARG 1.1-2 CM: CPT

## 2024-12-30 RX ORDER — CEPHALEXIN 500 MG/1
CAPSULE ORAL
Qty: 21 | Refills: 0 | Status: ERX | COMMUNITY
Start: 2024-12-30

## 2024-12-30 RX ORDER — MUPIROCIN 20 MG/G
OINTMENT TOPICAL
Qty: 22 | Refills: 1 | Status: ERX

## 2024-12-30 RX ADMIN — CEPHALEXIN: 500 CAPSULE ORAL at 00:00

## 2024-12-30 ASSESSMENT — LOCATION SIMPLE DESCRIPTION DERM: LOCATION SIMPLE: LEFT PRETIBIAL REGION

## 2024-12-30 ASSESSMENT — LOCATION ZONE DERM: LOCATION ZONE: LEG

## 2024-12-30 ASSESSMENT — LOCATION DETAILED DESCRIPTION DERM: LOCATION DETAILED: LEFT DISTAL PRETIBIAL REGION

## 2024-12-30 NOTE — PROCEDURE: EXCISION

## 2025-08-18 ENCOUNTER — RX ONLY (RX ONLY)
Age: 55
End: 2025-08-18

## (undated) DEVICE — STERILE CYSTO PACK: Brand: CARDINAL HEALTH

## (undated) DEVICE — GUIDEWIRE STRGHT TIP 0.035 IN  SOLO PLUS

## (undated) DEVICE — NDL HYPO MAGELLAN SFTY 18G 1.5IN

## (undated) DEVICE — PK ORTHO MINOR TOWER 40

## (undated) DEVICE — STCKNT IMPERV 12IN STRL

## (undated) DEVICE — BNDG ELAS ELITE V/CLOSE 4IN 5YD LF STRL

## (undated) DEVICE — SYR LL TP 10ML STRL

## (undated) DEVICE — GLOVE SRG BIOGEL 7.5

## (undated) DEVICE — SPECIMEN CONTAINER STERILE PEEL PACK

## (undated) DEVICE — SUT VIC 4/0 PS1 27IN J935H

## (undated) DEVICE — ENCORE® LATEX ORTHO SIZE 8, STERILE LATEX POWDER-FREE SURGICAL GLOVE: Brand: ENCORE

## (undated) DEVICE — SUT PROLN 4/0 FS2 18IN 8683G

## (undated) DEVICE — PROXIMATE RH ROTATING HEAD SKIN STAPLERS (35 WIDE) CONTAINS 35 STAINLESS STEEL STAPLES: Brand: PROXIMATE

## (undated) DEVICE — GLV SURG SENSICARE GREEN W/ALOE PF LF 8.5 STRL

## (undated) DEVICE — CATH URETERAL 5FR X 70 CM FLEX TIP POLYUR BARD

## (undated) DEVICE — OCCLUSIVE GAUZE STRIP,3% BISMUTH TRIBROMOPHENATE IN PETROLATUM BLEND: Brand: XEROFORM

## (undated) DEVICE — SHOE P/OP/CAST O/T FML LG 8/10

## (undated) DEVICE — DISPOSABLE TOURNIQUET CUFF SINGLE BLADDER, SINGLE PORT AND QUICK CONNECT CONNECTOR: Brand: COLOR CUFF

## (undated) DEVICE — BANDAGE,GAUZE,CONFORMING,4"X75",STRL,LF: Brand: MEDLINE

## (undated) DEVICE — NDL HYPO PRECISIONGLIDE REG 25G 1 1/2

## (undated) DEVICE — PREMIUM WET SKIN PREP TRAY: Brand: MEDLINE INDUSTRIES, INC.

## (undated) DEVICE — PAD,ABDOMINAL,8"X10",ST,LF: Brand: MEDLINE

## (undated) DEVICE — UNDERCAST PADDING: Brand: DEROYAL